# Patient Record
Sex: FEMALE | Race: WHITE | Employment: PART TIME | URBAN - METROPOLITAN AREA
[De-identification: names, ages, dates, MRNs, and addresses within clinical notes are randomized per-mention and may not be internally consistent; named-entity substitution may affect disease eponyms.]

---

## 2018-04-12 ENCOUNTER — HOSPITAL ENCOUNTER (EMERGENCY)
Age: 38
Discharge: HOME | End: 2018-04-13
Attending: EMERGENCY MEDICINE
Payer: COMMERCIAL

## 2018-04-12 DIAGNOSIS — R00.0 TACHYCARDIA: ICD-10-CM

## 2018-04-12 DIAGNOSIS — M79.10 MYALGIA: Primary | ICD-10-CM

## 2018-04-12 DIAGNOSIS — R11.2 NAUSEA AND VOMITING, INTRACTABILITY OF VOMITING NOT SPECIFIED, UNSPECIFIED VOMITING TYPE: ICD-10-CM

## 2018-04-12 PROCEDURE — 99283 EMERGENCY DEPT VISIT LOW MDM: CPT

## 2018-04-12 ASSESSMENT — PAIN SCALES - GENERAL: PAINLEVEL_OUTOF10: 5

## 2018-04-12 ASSESSMENT — PAIN DESCRIPTION - FREQUENCY: FREQUENCY: CONTINUOUS

## 2018-04-12 ASSESSMENT — PAIN DESCRIPTION - PAIN TYPE: TYPE: ACUTE PAIN

## 2018-04-12 ASSESSMENT — PAIN DESCRIPTION - DESCRIPTORS: DESCRIPTORS: ACHING

## 2018-04-12 ASSESSMENT — PAIN DESCRIPTION - LOCATION: LOCATION: GENERALIZED

## 2018-04-13 ENCOUNTER — APPOINTMENT (OUTPATIENT)
Dept: GENERAL RADIOLOGY | Age: 38
End: 2018-04-13
Payer: COMMERCIAL

## 2018-04-13 VITALS
OXYGEN SATURATION: 96 % | HEART RATE: 108 BPM | BODY MASS INDEX: 23.9 KG/M2 | WEIGHT: 140 LBS | HEIGHT: 64 IN | RESPIRATION RATE: 17 BRPM | SYSTOLIC BLOOD PRESSURE: 141 MMHG | DIASTOLIC BLOOD PRESSURE: 91 MMHG | TEMPERATURE: 99.4 F

## 2018-04-13 LAB
ALBUMIN SERPL-MCNC: 4.4 G/DL (ref 3.5–5.2)
ALP BLD-CCNC: 71 U/L (ref 35–104)
ALT SERPL-CCNC: 7 U/L (ref 0–32)
ANION GAP SERPL CALCULATED.3IONS-SCNC: 16 MMOL/L (ref 7–16)
ANION GAP SERPL CALCULATED.3IONS-SCNC: 20 MMOL/L (ref 7–16)
AST SERPL-CCNC: 22 U/L (ref 0–31)
BACTERIA: ABNORMAL /HPF
BASOPHILS ABSOLUTE: 0 E9/L (ref 0–0.2)
BASOPHILS RELATIVE PERCENT: 0.3 % (ref 0–2)
BETA-HYDROXYBUTYRATE: 0.53 MMOL/L (ref 0.02–0.27)
BILIRUB SERPL-MCNC: 1.1 MG/DL (ref 0–1.2)
BILIRUBIN URINE: NEGATIVE
BLOOD, URINE: ABNORMAL
BUN BLDV-MCNC: 12 MG/DL (ref 6–20)
BUN BLDV-MCNC: 13 MG/DL (ref 6–20)
CALCIUM SERPL-MCNC: 8.6 MG/DL (ref 8.6–10.2)
CALCIUM SERPL-MCNC: 9.4 MG/DL (ref 8.6–10.2)
CHLORIDE BLD-SCNC: 92 MMOL/L (ref 98–107)
CHLORIDE BLD-SCNC: 97 MMOL/L (ref 98–107)
CLARITY: CLEAR
CO2: 21 MMOL/L (ref 22–29)
CO2: 21 MMOL/L (ref 22–29)
COLOR: YELLOW
CREAT SERPL-MCNC: 1.2 MG/DL (ref 0.5–1)
CREAT SERPL-MCNC: 1.2 MG/DL (ref 0.5–1)
EOSINOPHILS ABSOLUTE: 0 E9/L (ref 0.05–0.5)
EOSINOPHILS RELATIVE PERCENT: 0.2 % (ref 0–6)
EPITHELIAL CELLS, UA: ABNORMAL /HPF
GFR AFRICAN AMERICAN: >60
GFR AFRICAN AMERICAN: >60
GFR NON-AFRICAN AMERICAN: 50 ML/MIN/1.73
GFR NON-AFRICAN AMERICAN: 50 ML/MIN/1.73
GLUCOSE BLD-MCNC: 177 MG/DL (ref 74–109)
GLUCOSE BLD-MCNC: 219 MG/DL (ref 74–109)
GLUCOSE URINE: >=1000 MG/DL
HCT VFR BLD CALC: 37.9 % (ref 34–48)
HEMOGLOBIN: 12.9 G/DL (ref 11.5–15.5)
INFLUENZA A BY PCR: NOT DETECTED
INFLUENZA B BY PCR: NOT DETECTED
KETONES, URINE: 15 MG/DL
LACTIC ACID: 2 MMOL/L (ref 0.5–2.2)
LEUKOCYTE ESTERASE, URINE: ABNORMAL
LIPASE: 7 U/L (ref 13–60)
LYMPHOCYTES ABSOLUTE: 0.09 E9/L (ref 1.5–4)
LYMPHOCYTES RELATIVE PERCENT: 0.9 % (ref 20–42)
MCH RBC QN AUTO: 30.2 PG (ref 26–35)
MCHC RBC AUTO-ENTMCNC: 34 % (ref 32–34.5)
MCV RBC AUTO: 88.8 FL (ref 80–99.9)
MONOCYTES ABSOLUTE: 0.09 E9/L (ref 0.1–0.95)
MONOCYTES RELATIVE PERCENT: 0.9 % (ref 2–12)
NEUTROPHILS ABSOLUTE: 8.43 E9/L (ref 1.8–7.3)
NEUTROPHILS RELATIVE PERCENT: 98.3 % (ref 43–80)
NITRITE, URINE: NEGATIVE
OVALOCYTES: ABNORMAL
PDW BLD-RTO: 12.7 FL (ref 11.5–15)
PH UA: 6.5 (ref 5–9)
PLATELET # BLD: 267 E9/L (ref 130–450)
PMV BLD AUTO: 9.8 FL (ref 7–12)
POIKILOCYTES: ABNORMAL
POTASSIUM SERPL-SCNC: 4.4 MMOL/L (ref 3.5–5)
POTASSIUM SERPL-SCNC: 4.9 MMOL/L (ref 3.5–5)
PROTEIN UA: NEGATIVE MG/DL
RBC # BLD: 4.27 E12/L (ref 3.5–5.5)
RBC UA: ABNORMAL /HPF (ref 0–2)
SODIUM BLD-SCNC: 133 MMOL/L (ref 132–146)
SODIUM BLD-SCNC: 134 MMOL/L (ref 132–146)
SPECIFIC GRAVITY UA: 1.01 (ref 1–1.03)
TOTAL PROTEIN: 7.5 G/DL (ref 6.4–8.3)
UROBILINOGEN, URINE: 0.2 E.U./DL
WBC # BLD: 8.6 E9/L (ref 4.5–11.5)
WBC UA: ABNORMAL /HPF (ref 0–5)

## 2018-04-13 PROCEDURE — 36415 COLL VENOUS BLD VENIPUNCTURE: CPT

## 2018-04-13 PROCEDURE — 83690 ASSAY OF LIPASE: CPT

## 2018-04-13 PROCEDURE — 71045 X-RAY EXAM CHEST 1 VIEW: CPT

## 2018-04-13 PROCEDURE — 82010 KETONE BODYS QUAN: CPT

## 2018-04-13 PROCEDURE — 85025 COMPLETE CBC W/AUTO DIFF WBC: CPT

## 2018-04-13 PROCEDURE — 93005 ELECTROCARDIOGRAM TRACING: CPT | Performed by: EMERGENCY MEDICINE

## 2018-04-13 PROCEDURE — 87040 BLOOD CULTURE FOR BACTERIA: CPT

## 2018-04-13 PROCEDURE — 80053 COMPREHEN METABOLIC PANEL: CPT

## 2018-04-13 PROCEDURE — 87502 INFLUENZA DNA AMP PROBE: CPT

## 2018-04-13 PROCEDURE — 83605 ASSAY OF LACTIC ACID: CPT

## 2018-04-13 PROCEDURE — 81001 URINALYSIS AUTO W/SCOPE: CPT

## 2018-04-13 PROCEDURE — 80048 BASIC METABOLIC PNL TOTAL CA: CPT

## 2018-04-13 PROCEDURE — 2580000003 HC RX 258: Performed by: EMERGENCY MEDICINE

## 2018-04-13 RX ORDER — 0.9 % SODIUM CHLORIDE 0.9 %
1000 INTRAVENOUS SOLUTION INTRAVENOUS ONCE
Status: COMPLETED | OUTPATIENT
Start: 2018-04-13 | End: 2018-04-13

## 2018-04-13 RX ADMIN — SODIUM CHLORIDE 1000 ML: 9 INJECTION, SOLUTION INTRAVENOUS at 00:55

## 2018-04-18 LAB
BLOOD CULTURE, ROUTINE: NORMAL
CULTURE, BLOOD 2: NORMAL
EKG ATRIAL RATE: 112 BPM
EKG P AXIS: 60 DEGREES
EKG P-R INTERVAL: 136 MS
EKG Q-T INTERVAL: 334 MS
EKG QRS DURATION: 84 MS
EKG QTC CALCULATION (BAZETT): 455 MS
EKG R AXIS: 53 DEGREES
EKG T AXIS: 61 DEGREES
EKG VENTRICULAR RATE: 112 BPM

## 2018-04-28 ENCOUNTER — HOSPITAL ENCOUNTER (OUTPATIENT)
Age: 38
Discharge: HOME | End: 2018-04-28
Payer: COMMERCIAL

## 2018-04-28 LAB
ALBUMIN SERPL-MCNC: 4.1 G/DL (ref 3.5–5.2)
ALP BLD-CCNC: 117 U/L (ref 35–104)
ALT SERPL-CCNC: 28 U/L (ref 0–32)
ANION GAP SERPL CALCULATED.3IONS-SCNC: 12 MMOL/L (ref 7–16)
AST SERPL-CCNC: 22 U/L (ref 0–31)
BILIRUB SERPL-MCNC: 0.7 MG/DL (ref 0–1.2)
BUN BLDV-MCNC: 10 MG/DL (ref 6–20)
CALCIUM SERPL-MCNC: 9.3 MG/DL (ref 8.6–10.2)
CHLORIDE BLD-SCNC: 101 MMOL/L (ref 98–107)
CHOLESTEROL, FASTING: 170 MG/DL (ref 0–199)
CO2: 26 MMOL/L (ref 22–29)
CREAT SERPL-MCNC: 0.9 MG/DL (ref 0.5–1)
CREATININE URINE: 115 MG/DL (ref 29–226)
GFR AFRICAN AMERICAN: >60
GFR NON-AFRICAN AMERICAN: >60 ML/MIN/1.73
GLUCOSE FASTING: 135 MG/DL (ref 74–109)
HBA1C MFR BLD: 6.3 % (ref 4.8–5.9)
HDLC SERPL-MCNC: 51 MG/DL
LDL CHOLESTEROL CALCULATED: 105 MG/DL (ref 0–99)
MICROALBUMIN UR-MCNC: <12 MG/L
MICROALBUMIN/CREAT UR-RTO: ABNORMAL (ref 0–30)
POTASSIUM SERPL-SCNC: 3.8 MMOL/L (ref 3.5–5)
SODIUM BLD-SCNC: 139 MMOL/L (ref 132–146)
T4 FREE: 1.46 NG/DL (ref 0.93–1.7)
TOTAL PROTEIN: 7.4 G/DL (ref 6.4–8.3)
TRIGLYCERIDE, FASTING: 69 MG/DL (ref 0–149)
TSH SERPL DL<=0.05 MIU/L-ACNC: 0.6 UIU/ML (ref 0.27–4.2)
VLDLC SERPL CALC-MCNC: 14 MG/DL

## 2018-04-28 PROCEDURE — 82570 ASSAY OF URINE CREATININE: CPT

## 2018-04-28 PROCEDURE — 84439 ASSAY OF FREE THYROXINE: CPT

## 2018-04-28 PROCEDURE — 83721 ASSAY OF BLOOD LIPOPROTEIN: CPT

## 2018-04-28 PROCEDURE — 83036 HEMOGLOBIN GLYCOSYLATED A1C: CPT

## 2018-04-28 PROCEDURE — 80061 LIPID PANEL: CPT

## 2018-04-28 PROCEDURE — 80053 COMPREHEN METABOLIC PANEL: CPT

## 2018-04-28 PROCEDURE — 84443 ASSAY THYROID STIM HORMONE: CPT

## 2018-04-28 PROCEDURE — 36415 COLL VENOUS BLD VENIPUNCTURE: CPT

## 2018-04-28 PROCEDURE — 82044 UR ALBUMIN SEMIQUANTITATIVE: CPT

## 2018-05-06 LAB
Lab: NORMAL
REPORT: NORMAL
THIS TEST SENT TO: NORMAL

## 2018-05-18 ENCOUNTER — TELEPHONE (OUTPATIENT)
Dept: ORTHOPEDIC SURGERY | Age: 38
End: 2018-05-18

## 2018-05-18 DIAGNOSIS — M65.321 TRIGGER FINGER, RIGHT INDEX FINGER: Primary | ICD-10-CM

## 2018-05-18 DIAGNOSIS — M65.341 TRIGGER FINGER, RIGHT RING FINGER: ICD-10-CM

## 2018-05-22 ENCOUNTER — OFFICE VISIT (OUTPATIENT)
Dept: ORTHOPEDIC SURGERY | Age: 38
End: 2018-05-22
Payer: COMMERCIAL

## 2018-05-22 VITALS
SYSTOLIC BLOOD PRESSURE: 120 MMHG | WEIGHT: 140 LBS | HEIGHT: 64 IN | TEMPERATURE: 98.3 F | HEART RATE: 74 BPM | DIASTOLIC BLOOD PRESSURE: 80 MMHG | RESPIRATION RATE: 16 BRPM | BODY MASS INDEX: 23.9 KG/M2

## 2018-05-22 DIAGNOSIS — M65.321 TRIGGER FINGER, RIGHT INDEX FINGER: Primary | ICD-10-CM

## 2018-05-22 PROCEDURE — 1036F TOBACCO NON-USER: CPT | Performed by: ORTHOPAEDIC SURGERY

## 2018-05-22 PROCEDURE — 20550 NJX 1 TENDON SHEATH/LIGAMENT: CPT | Performed by: ORTHOPAEDIC SURGERY

## 2018-05-22 PROCEDURE — G8420 CALC BMI NORM PARAMETERS: HCPCS | Performed by: ORTHOPAEDIC SURGERY

## 2018-05-22 PROCEDURE — G8427 DOCREV CUR MEDS BY ELIG CLIN: HCPCS | Performed by: ORTHOPAEDIC SURGERY

## 2018-05-22 PROCEDURE — 99212 OFFICE O/P EST SF 10 MIN: CPT | Performed by: ORTHOPAEDIC SURGERY

## 2018-05-22 RX ORDER — BETAMETHASONE SODIUM PHOSPHATE AND BETAMETHASONE ACETATE 3; 3 MG/ML; MG/ML
6 INJECTION, SUSPENSION INTRA-ARTICULAR; INTRALESIONAL; INTRAMUSCULAR; SOFT TISSUE ONCE
Status: COMPLETED | OUTPATIENT
Start: 2018-05-22 | End: 2018-05-22

## 2018-05-22 RX ADMIN — BETAMETHASONE SODIUM PHOSPHATE AND BETAMETHASONE ACETATE 6 MG: 3; 3 INJECTION, SUSPENSION INTRA-ARTICULAR; INTRALESIONAL; INTRAMUSCULAR; SOFT TISSUE at 14:47

## 2018-06-20 ENCOUNTER — HOSPITAL ENCOUNTER (OUTPATIENT)
Age: 38
Discharge: HOME | End: 2018-06-20
Payer: COMMERCIAL

## 2018-06-20 LAB
ALBUMIN SERPL-MCNC: 4.3 G/DL (ref 3.5–5.2)
ALP BLD-CCNC: 51 U/L (ref 35–104)
ALT SERPL-CCNC: 6 U/L (ref 0–32)
ANION GAP SERPL CALCULATED.3IONS-SCNC: 12 MMOL/L (ref 7–16)
AST SERPL-CCNC: 15 U/L (ref 0–31)
BILIRUB SERPL-MCNC: 0.6 MG/DL (ref 0–1.2)
BUN BLDV-MCNC: 8 MG/DL (ref 6–20)
CALCIUM SERPL-MCNC: 9.1 MG/DL (ref 8.6–10.2)
CHLORIDE BLD-SCNC: 99 MMOL/L (ref 98–107)
CHOLESTEROL, FASTING: 173 MG/DL (ref 0–199)
CO2: 24 MMOL/L (ref 22–29)
CREAT SERPL-MCNC: 0.9 MG/DL (ref 0.5–1)
GFR AFRICAN AMERICAN: >60
GFR NON-AFRICAN AMERICAN: >60 ML/MIN/1.73
GLUCOSE BLD-MCNC: 150 MG/DL (ref 74–109)
HBA1C MFR BLD: 6.4 % (ref 4.8–5.9)
HDLC SERPL-MCNC: 63 MG/DL
LDL CHOLESTEROL CALCULATED: 100 MG/DL (ref 0–99)
POTASSIUM SERPL-SCNC: 4.1 MMOL/L (ref 3.5–5)
SODIUM BLD-SCNC: 135 MMOL/L (ref 132–146)
T4 FREE: 1.61 NG/DL (ref 0.93–1.7)
TOTAL PROTEIN: 7 G/DL (ref 6.4–8.3)
TRIGLYCERIDE, FASTING: 50 MG/DL (ref 0–149)
TSH SERPL DL<=0.05 MIU/L-ACNC: 0.19 UIU/ML (ref 0.27–4.2)
VLDLC SERPL CALC-MCNC: 10 MG/DL

## 2018-06-20 PROCEDURE — 36415 COLL VENOUS BLD VENIPUNCTURE: CPT

## 2018-06-20 PROCEDURE — 83721 ASSAY OF BLOOD LIPOPROTEIN: CPT

## 2018-06-20 PROCEDURE — 83036 HEMOGLOBIN GLYCOSYLATED A1C: CPT

## 2018-06-20 PROCEDURE — 84439 ASSAY OF FREE THYROXINE: CPT

## 2018-06-20 PROCEDURE — 80053 COMPREHEN METABOLIC PANEL: CPT

## 2018-06-20 PROCEDURE — 84443 ASSAY THYROID STIM HORMONE: CPT

## 2018-06-20 PROCEDURE — 80061 LIPID PANEL: CPT

## 2018-06-25 LAB
Lab: NORMAL
REPORT: NORMAL
THIS TEST SENT TO: NORMAL

## 2018-09-11 ENCOUNTER — OFFICE VISIT (OUTPATIENT)
Dept: ORTHOPEDIC SURGERY | Age: 38
End: 2018-09-11
Payer: COMMERCIAL

## 2018-09-11 VITALS
DIASTOLIC BLOOD PRESSURE: 85 MMHG | SYSTOLIC BLOOD PRESSURE: 134 MMHG | RESPIRATION RATE: 16 BRPM | TEMPERATURE: 98.2 F | HEART RATE: 101 BPM

## 2018-09-11 DIAGNOSIS — M65.351 TRIGGER LITTLE FINGER OF RIGHT HAND: Primary | ICD-10-CM

## 2018-09-11 DIAGNOSIS — M65.321 TRIGGER INDEX FINGER OF RIGHT HAND: ICD-10-CM

## 2018-09-11 PROCEDURE — 1036F TOBACCO NON-USER: CPT | Performed by: ORTHOPAEDIC SURGERY

## 2018-09-11 PROCEDURE — G8427 DOCREV CUR MEDS BY ELIG CLIN: HCPCS | Performed by: ORTHOPAEDIC SURGERY

## 2018-09-11 PROCEDURE — G8420 CALC BMI NORM PARAMETERS: HCPCS | Performed by: ORTHOPAEDIC SURGERY

## 2018-09-11 PROCEDURE — 20550 NJX 1 TENDON SHEATH/LIGAMENT: CPT | Performed by: ORTHOPAEDIC SURGERY

## 2018-09-11 PROCEDURE — 99213 OFFICE O/P EST LOW 20 MIN: CPT | Performed by: ORTHOPAEDIC SURGERY

## 2018-09-11 RX ORDER — BUSPIRONE HYDROCHLORIDE 7.5 MG/1
7.5 TABLET ORAL
COMMUNITY
End: 2021-12-09

## 2018-09-11 RX ORDER — BETAMETHASONE SODIUM PHOSPHATE AND BETAMETHASONE ACETATE 3; 3 MG/ML; MG/ML
12 INJECTION, SUSPENSION INTRA-ARTICULAR; INTRALESIONAL; INTRAMUSCULAR; SOFT TISSUE ONCE
Status: COMPLETED | OUTPATIENT
Start: 2018-09-11 | End: 2018-09-13

## 2018-09-11 NOTE — PROGRESS NOTES
Department of Orthopedic Surgery  History and Physical      CHIEF COMPLAINT:  Right hand pain    HISTORY OF PRESENT ILLNESS:                The patient is a 43 y.o. female who presents with A right hand pain. At her last visit she had a right index finger cortisone injection. She states this helped her pain. She still remains with clicking. She reports pain to the right small finger. She states this is quite painful to her. No numbness or tingling. Past Medical History:        Diagnosis Date    Anxiety     Diabetes mellitus type 1, controlled, without complications ([address] 8/3/7952    DKA [facility]diabetic ketoacidoses) [address] [address]     2016 was [facility] (see notes)    Epicondylitis, lateral 2017    right    Epigastric pain 2016    HX OTHER MEDICAL     after C section, face itched    Hypertension     IBS (irritable bowel syndrome)     Insulin pump status has insulin pump    SIRS (systemic inflammatory response syndrome) ([facility] [address] 2016    Thyroid disease      Past Surgical History:        Procedure Laterality Date    CARPAL TUNNEL RELEASE      left hand     DEBRIDEMENT Right 2017    Right lateral epicondylar debridement    ENDOSCOPY, COLON, DIAGNOSTIC      FINGER TRIGGER RELEASE      middle finger bilat, and right thumb     FINGER TRIGGER RELEASE Right 2016    4th finger    LAPAROSCOPY      exploratory     [place]  DELIVERY ONLY  x2    , Low Cervical    SKIN BIOPSY       Current Medications:   No current facility-administered medications for this visit. Allergies:  Penicillins and Sulfa antibiotics    Social History:   TOBACCO:   reports that she has never smoked. She has never used smokeless tobacco.  ETOH:   reports that she drinks alcohol. DRUGS:   reports that she does not use drugs.   ACTIVITIES OF DAILY LIVING:    OCCUPATION:    Family History:   Family History   Problem Relation Age of Onset    Cancer Mother     High Blood Pressure Maternal Grandmother        REVIEW OF SYSTEMS:  CONSTITUTIONAL:  negative  EYES:  negative  HEENT:  negative  RESPIRATORY:  negative  CARDIOVASCULAR:  negative  GASTROINTESTINAL:  negative  GENITOURINARY:  negative  INTEGUMENT/BREAST:  negative  HEMATOLOGIC/LYMPHATIC:  negative  ALLERGIC/IMMUNOLOGIC:  negative  ENDOCRINE:  negative  MUSCULOSKELETAL:  pain  NEUROLOGICAL:  negative  BEHAVIOR/PSYCH:  negative    PHYSICAL EXAM:    VITALS:  /85 (Site: Left Upper Arm, Position: Sitting)   Pulse 101   Temp 98.2 °F (36.8 °C) (Oral)   Resp 16   CONSTITUTIONAL:  awake, alert, cooperative, no apparent distress, and appears stated age  EYES:  Lids and lashes normal, pupils equal, round and reactive to light, extra ocular muscles intact, sclera clear, conjunctiva normal  ENT:  Normocephalic, without obvious abnormality, atraumatic, sinuses nontender on palpation, external ears without lesions, oral pharynx with moist mucus membranes, tonsils without erythema or exudates, gums normal and good dentition. NECK:  Supple, symmetrical, trachea midline, no adenopathy, thyroid symmetric, not enlarged and no tenderness, skin normal  LUNGS:  CTA  CARDIOVASCULAR:  2+ radial pulses, extremities warm and well perfused  ABDOMEN:   NTTP  CHEST:  Atraumatic   GENITAL/URINARY:  deferred  NEUROLOGIC:  Awake, alert, oriented to name, place and time. Cranial nerves II-XII are grossly intact. Motor is 5 out of 5 bilaterally. Sensory is intact.  gait is normal.  MUSCULOSKELETAL:    Right upper extremity: Positive tenderness and swelling over A1 pulley to the ring and small finger with no active triggering. Active triggering over the A1 pulley of the index finger with no tenderness. Well-healed carpal tunnel incision. Full thumb middle ring and small finger flexion and extension without triggering. Radial, ulnar, median nerves are intact light touch. 5/5 motor testing to upper extremity. 2+ radial pulse. Brisk cap refill digits.     DATA:    CBC: Lab Results   Component Value Date    WBC 8.6 04/13/2018    RBC 4.27 04/13/2018    HGB 12.9 04/13/2018    HCT 37.9 04/13/2018    MCV 88.8 04/13/2018    MCH 30.2 04/13/2018    MCHC 34.0 04/13/2018    RDW 12.7 04/13/2018     04/13/2018    MPV 9.8 04/13/2018     PT/INR:  No results found for: PROTIME, INR      IMPRESSION:  · Right index and small finger trigger    PLAN:  Discussed findings with the patient. Discussed conservative and surgical management with the patient. Ultimately the patient decided to have a repeat of her right index finger trigger cortisone injection along with a right small finger cortisone injection. If these help, the patient may repeat them every 3 months. If she has no improvement she may consider surgical management. Follow-up as needed. Procedure Note Trigger Finger Injection    The right Index finger A1 pulley and Little finger A1 pulley was identified as the injection site. The risk and benefits of a cortisone injection were explained and the patient consented to the injection. Under sterile conditions, the digit was injected with a mixture of 1 mL of 1% Lidocaine and 1 mL of 6 mg/mL Betamethasone without complication. A sterile bandage was applied. I have seen and evaluated the patient and agree with the above assessment and plan on today's visit. I have performed the key components of the history and physical examination with significant findings of Persistent right index and little finger triggers. Injections provided. Discussed possible surgical intervention if necessary. . I concur with the findings and plan as documented.     Alicia Corea MD  9/11/2018

## 2018-09-13 RX ADMIN — BETAMETHASONE SODIUM PHOSPHATE AND BETAMETHASONE ACETATE 12 MG: 3; 3 INJECTION, SUSPENSION INTRA-ARTICULAR; INTRALESIONAL; INTRAMUSCULAR; SOFT TISSUE at 09:36

## 2019-02-04 ENCOUNTER — OFFICE VISIT (OUTPATIENT)
Dept: ORTHOPEDIC SURGERY | Age: 39
End: 2019-02-04
Payer: COMMERCIAL

## 2019-02-04 VITALS
HEIGHT: 64 IN | BODY MASS INDEX: 24.75 KG/M2 | TEMPERATURE: 98.7 F | SYSTOLIC BLOOD PRESSURE: 134 MMHG | HEART RATE: 94 BPM | WEIGHT: 145 LBS | RESPIRATION RATE: 20 BRPM | DIASTOLIC BLOOD PRESSURE: 87 MMHG

## 2019-02-04 DIAGNOSIS — M65.321 TRIGGER INDEX FINGER OF RIGHT HAND: ICD-10-CM

## 2019-02-04 DIAGNOSIS — M65.351 TRIGGER LITTLE FINGER OF RIGHT HAND: Primary | ICD-10-CM

## 2019-02-04 PROCEDURE — 20550 NJX 1 TENDON SHEATH/LIGAMENT: CPT | Performed by: ORTHOPAEDIC SURGERY

## 2019-02-04 PROCEDURE — 1036F TOBACCO NON-USER: CPT | Performed by: ORTHOPAEDIC SURGERY

## 2019-02-04 PROCEDURE — G8420 CALC BMI NORM PARAMETERS: HCPCS | Performed by: ORTHOPAEDIC SURGERY

## 2019-02-04 PROCEDURE — 99214 OFFICE O/P EST MOD 30 MIN: CPT | Performed by: ORTHOPAEDIC SURGERY

## 2019-02-04 PROCEDURE — G8427 DOCREV CUR MEDS BY ELIG CLIN: HCPCS | Performed by: ORTHOPAEDIC SURGERY

## 2019-02-04 PROCEDURE — G8484 FLU IMMUNIZE NO ADMIN: HCPCS | Performed by: ORTHOPAEDIC SURGERY

## 2019-02-04 RX ORDER — BETAMETHASONE SODIUM PHOSPHATE AND BETAMETHASONE ACETATE 3; 3 MG/ML; MG/ML
12 INJECTION, SUSPENSION INTRA-ARTICULAR; INTRALESIONAL; INTRAMUSCULAR; SOFT TISSUE ONCE
Status: COMPLETED | OUTPATIENT
Start: 2019-02-04 | End: 2019-02-04

## 2019-02-04 RX ADMIN — BETAMETHASONE SODIUM PHOSPHATE AND BETAMETHASONE ACETATE 12 MG: 3; 3 INJECTION, SUSPENSION INTRA-ARTICULAR; INTRALESIONAL; INTRAMUSCULAR; SOFT TISSUE at 15:58

## 2019-03-16 ENCOUNTER — HOSPITAL ENCOUNTER (OUTPATIENT)
Age: 39
Discharge: HOME | End: 2019-03-16
Payer: MEDICAID

## 2019-03-16 LAB
ALBUMIN SERPL-MCNC: 4.7 G/DL (ref 3.5–5.2)
ALP BLD-CCNC: 56 U/L (ref 35–104)
ALT SERPL-CCNC: 8 U/L (ref 0–32)
ANION GAP SERPL CALCULATED.3IONS-SCNC: 10 MMOL/L (ref 7–16)
AST SERPL-CCNC: 16 U/L (ref 0–31)
BILIRUB SERPL-MCNC: 0.6 MG/DL (ref 0–1.2)
BUN BLDV-MCNC: 7 MG/DL (ref 6–20)
CALCIUM SERPL-MCNC: 9.6 MG/DL (ref 8.6–10.2)
CHLORIDE BLD-SCNC: 99 MMOL/L (ref 98–107)
CO2: 31 MMOL/L (ref 22–29)
CREAT SERPL-MCNC: 0.9 MG/DL (ref 0.5–1)
CREATININE URINE: 185 MG/DL (ref 29–226)
GFR AFRICAN AMERICAN: >60
GFR NON-AFRICAN AMERICAN: >60 ML/MIN/1.73
GLUCOSE BLD-MCNC: 83 MG/DL (ref 74–99)
HBA1C MFR BLD: 6.6 % (ref 4–5.6)
MICROALBUMIN UR-MCNC: <12 MG/L
MICROALBUMIN/CREAT UR-RTO: ABNORMAL (ref 0–30)
POTASSIUM SERPL-SCNC: 4.6 MMOL/L (ref 3.5–5)
SODIUM BLD-SCNC: 140 MMOL/L (ref 132–146)
T4 FREE: 1.77 NG/DL (ref 0.93–1.7)
TOTAL PROTEIN: 7.6 G/DL (ref 6.4–8.3)
TSH SERPL DL<=0.05 MIU/L-ACNC: 0.06 UIU/ML (ref 0.27–4.2)
VITAMIN D 25-HYDROXY: 13 NG/ML (ref 30–100)

## 2019-03-16 PROCEDURE — 82570 ASSAY OF URINE CREATININE: CPT

## 2019-03-16 PROCEDURE — 84439 ASSAY OF FREE THYROXINE: CPT

## 2019-03-16 PROCEDURE — 83036 HEMOGLOBIN GLYCOSYLATED A1C: CPT

## 2019-03-16 PROCEDURE — 80053 COMPREHEN METABOLIC PANEL: CPT

## 2019-03-16 PROCEDURE — 84443 ASSAY THYROID STIM HORMONE: CPT

## 2019-03-16 PROCEDURE — 82044 UR ALBUMIN SEMIQUANTITATIVE: CPT

## 2019-03-16 PROCEDURE — 36415 COLL VENOUS BLD VENIPUNCTURE: CPT

## 2019-03-16 PROCEDURE — 82306 VITAMIN D 25 HYDROXY: CPT

## 2019-03-20 ENCOUNTER — PREP FOR PROCEDURE (OUTPATIENT)
Dept: ORTHOPEDIC SURGERY | Age: 39
End: 2019-03-20

## 2019-03-20 RX ORDER — SODIUM CHLORIDE 0.9 % (FLUSH) 0.9 %
10 SYRINGE (ML) INJECTION EVERY 12 HOURS SCHEDULED
Status: CANCELLED | OUTPATIENT
Start: 2019-03-20

## 2019-03-20 RX ORDER — SODIUM CHLORIDE 9 MG/ML
INJECTION, SOLUTION INTRAVENOUS CONTINUOUS
Status: CANCELLED | OUTPATIENT
Start: 2019-03-20

## 2019-03-20 RX ORDER — SODIUM CHLORIDE 0.9 % (FLUSH) 0.9 %
10 SYRINGE (ML) INJECTION PRN
Status: CANCELLED | OUTPATIENT
Start: 2019-03-20

## 2019-03-28 RX ORDER — CHOLECALCIFEROL (VITAMIN D3) 1250 MCG
CAPSULE ORAL
COMMUNITY
End: 2019-10-16

## 2019-03-28 RX ORDER — LEVOTHYROXINE SODIUM 88 UG/1
88 TABLET ORAL DAILY
COMMUNITY
End: 2019-10-16

## 2019-03-28 NOTE — PROGRESS NOTES
[facility] PRE-ADMISSION TESTING INSTRUCTIONS    The Preadmission Testing patient is instructed accordingly using the following criteria (check applicable):    ARRIVAL INSTRUCTIONS:  [x] Parking the day of Surgery is located in the Main Entrance lot. Upon entering the door, make an immediate right to the surgery reception desk    [x] Complimentary [address] Park[address] is available Monday through Friday 6 am to 6 pm    [x] Bring photo ID and insurance card    [] Bring in a copy of Living will or Durable Power of  papers. [x] Please be sure to arrange for responsible adult to provide transportation to and from the hospital    [x] Please arrange for responsible adult to be with you for the 24 hour period post procedure due to having anesthesia      GENERAL INSTRUCTIONS:    [x] Nothing by mouth after midnight, including gum, candy, mints or water    [x] You may brush your teeth, but do not swallow any water    [x] Take medications as instructed with 1-2 oz of water    [x] Stop herbal supplements and vitamins 5 days prior to procedure    [] Follow preop dosing of blood thinners per physician instructions    [x] to adjust insulin pump per Dr. Shabnam Salgado    [] No oral diabetic medications after midnight    [x] If diabetic and have low blood sugar or feel symptomatic, take 1-2oz apple juice only    [] Bring inhalers day of surgery    [] Bring C-PAP/ Bi-Pap day of surgery    [x] Bring urine specimen day of surgery    [x] Shower or bath with soap, lather and rinse well, AM of Surgery, no lotion, powders or creams    [] Follow bowel prep as instructed per surgeon    [] No tobacco products within 24 hours of surgery     [x] No alcohol or illegal drug use within 24 hours of surgery.     [x] Jewelry, body piercing's, eyeglasses, contact lenses and dentures are not permitted into surgery (bring cases)      [x] Please do not wear any nail polish, make up or hair products on the day of surgery    [x] If not already done, you can expect a call from registration    [x] You can expect a call the business day prior to procedure to notify you if your arrival time changes    [x] If you receive a survey after surgery we would greatly appreciate your comments    [] Parent/guardian of a minor must accompany their child and remain on the premises  the entire time they are under our care     [] Pediatric patients may bring favorite toy, blanket or comfort item with them    [] A caregiver or family member must remain with the patient during their stay if they are mentally handicapped, have dementia, disoriented or unable to use a call light or would be a safety concern if left unattended    [x] Please notify surgeon if you develop any illness between now and time of surgery (cold, cough, sore throat, fever, nausea, vomiting) or any signs of infections  including skin, wounds, and dental.    [x]  The Outpatient Pharmacy is available to fill your prescription here on your day of surgery, ask your preop nurse for details    [] Other instructions  EDUCATIONAL MATERIALS PROVIDED:    [] PAT Preoperative Education Packet/Booklet     [] Medication List    [] Fluoroscopy Information Pamphlet    [] Transfusion bracelet applied with instructions    [] Joint replacement video reviewed    [] Shower with soap, lather and rinse well, and use CHG wipes provided the evening before surgery as instructed

## 2019-04-03 ENCOUNTER — HOSPITAL ENCOUNTER (OUTPATIENT)
Age: 39
Setting detail: OUTPATIENT SURGERY
Discharge: HOME | End: 2019-04-03
Attending: ORTHOPAEDIC SURGERY | Admitting: ORTHOPAEDIC SURGERY
Payer: MEDICAID

## 2019-04-03 ENCOUNTER — ANESTHESIA EVENT (OUTPATIENT)
Dept: OPERATING ROOM | Age: 39
End: 2019-04-03
Payer: MEDICAID

## 2019-04-03 ENCOUNTER — ANESTHESIA (OUTPATIENT)
Dept: OPERATING ROOM | Age: 39
End: 2019-04-03
Payer: MEDICAID

## 2019-04-03 VITALS
BODY MASS INDEX: 25.61 KG/M2 | RESPIRATION RATE: 16 BRPM | TEMPERATURE: 97.5 F | DIASTOLIC BLOOD PRESSURE: 89 MMHG | HEIGHT: 64 IN | OXYGEN SATURATION: 95 % | SYSTOLIC BLOOD PRESSURE: 151 MMHG | HEART RATE: 90 BPM | WEIGHT: 150 LBS

## 2019-04-03 VITALS — OXYGEN SATURATION: 93 % | SYSTOLIC BLOOD PRESSURE: 132 MMHG | DIASTOLIC BLOOD PRESSURE: 76 MMHG

## 2019-04-03 DIAGNOSIS — G89.18 POST-OPERATIVE PAIN: Primary | ICD-10-CM

## 2019-04-03 LAB
HCG(URINE) PREGNANCY TEST: NEGATIVE
METER GLUCOSE: 202 MG/DL (ref 74–99)

## 2019-04-03 PROCEDURE — 2500000003 HC RX 250 WO HCPCS: Performed by: ORTHOPAEDIC SURGERY

## 2019-04-03 PROCEDURE — 26055 INCISE FINGER TENDON SHEATH: CPT | Performed by: ORTHOPAEDIC SURGERY

## 2019-04-03 PROCEDURE — 2709999900 HC NON-CHARGEABLE SUPPLY: Performed by: ORTHOPAEDIC SURGERY

## 2019-04-03 PROCEDURE — 6360000002 HC RX W HCPCS: Performed by: NURSE ANESTHETIST, CERTIFIED REGISTERED

## 2019-04-03 PROCEDURE — 2500000003 HC RX 250 WO HCPCS: Performed by: NURSE ANESTHETIST, CERTIFIED REGISTERED

## 2019-04-03 PROCEDURE — 6360000002 HC RX W HCPCS: Performed by: PHYSICIAN ASSISTANT

## 2019-04-03 PROCEDURE — 3600000002 HC SURGERY LEVEL 2 BASE: Performed by: ORTHOPAEDIC SURGERY

## 2019-04-03 PROCEDURE — 7100000010 HC PHASE II RECOVERY - FIRST 15 MIN: Performed by: ORTHOPAEDIC SURGERY

## 2019-04-03 PROCEDURE — 7100000011 HC PHASE II RECOVERY - ADDTL 15 MIN: Performed by: ORTHOPAEDIC SURGERY

## 2019-04-03 PROCEDURE — 82962 GLUCOSE BLOOD TEST: CPT

## 2019-04-03 PROCEDURE — 81025 URINE PREGNANCY TEST: CPT

## 2019-04-03 PROCEDURE — 2580000003 HC RX 258: Performed by: NURSE ANESTHETIST, CERTIFIED REGISTERED

## 2019-04-03 PROCEDURE — 3700000001 HC ADD 15 MINUTES (ANESTHESIA): Performed by: ORTHOPAEDIC SURGERY

## 2019-04-03 PROCEDURE — 3700000000 HC ANESTHESIA ATTENDED CARE: Performed by: ORTHOPAEDIC SURGERY

## 2019-04-03 PROCEDURE — 3600000012 HC SURGERY LEVEL 2 ADDTL 15MIN: Performed by: ORTHOPAEDIC SURGERY

## 2019-04-03 RX ORDER — PROPOFOL 10 MG/ML
INJECTION, EMULSION INTRAVENOUS CONTINUOUS PRN
Status: DISCONTINUED | OUTPATIENT
Start: 2019-04-03 | End: 2019-04-03 | Stop reason: SDUPTHER

## 2019-04-03 RX ORDER — OXYCODONE HYDROCHLORIDE AND ACETAMINOPHEN 5; 325 MG/1; MG/1
1 TABLET ORAL
Status: DISCONTINUED | OUTPATIENT
Start: 2019-04-03 | End: 2019-04-03

## 2019-04-03 RX ORDER — SODIUM CHLORIDE 9 MG/ML
INJECTION, SOLUTION INTRAVENOUS CONTINUOUS
Status: DISCONTINUED | OUTPATIENT
Start: 2019-04-03 | End: 2019-04-03 | Stop reason: HOSPADM

## 2019-04-03 RX ORDER — FENTANYL CITRATE 50 UG/ML
INJECTION, SOLUTION INTRAMUSCULAR; INTRAVENOUS PRN
Status: DISCONTINUED | OUTPATIENT
Start: 2019-04-03 | End: 2019-04-03 | Stop reason: SDUPTHER

## 2019-04-03 RX ORDER — SODIUM CHLORIDE 0.9 % (FLUSH) 0.9 %
10 SYRINGE (ML) INJECTION EVERY 12 HOURS SCHEDULED
Status: DISCONTINUED | OUTPATIENT
Start: 2019-04-03 | End: 2019-04-03 | Stop reason: HOSPADM

## 2019-04-03 RX ORDER — MIDAZOLAM HYDROCHLORIDE 1 MG/ML
INJECTION INTRAMUSCULAR; INTRAVENOUS PRN
Status: DISCONTINUED | OUTPATIENT
Start: 2019-04-03 | End: 2019-04-03 | Stop reason: SDUPTHER

## 2019-04-03 RX ORDER — LIDOCAINE HYDROCHLORIDE 10 MG/ML
INJECTION, SOLUTION INFILTRATION; PERINEURAL PRN
Status: DISCONTINUED | OUTPATIENT
Start: 2019-04-03 | End: 2019-04-03 | Stop reason: ALTCHOICE

## 2019-04-03 RX ORDER — HYDROCODONE BITARTRATE AND ACETAMINOPHEN 5; 325 MG/1; MG/1
1 TABLET ORAL
Status: DISCONTINUED | OUTPATIENT
Start: 2019-04-03 | End: 2019-04-03 | Stop reason: HOSPADM

## 2019-04-03 RX ORDER — CEFAZOLIN SODIUM 2 G/50ML
2 SOLUTION INTRAVENOUS
Status: COMPLETED | OUTPATIENT
Start: 2019-04-03 | End: 2019-04-03

## 2019-04-03 RX ORDER — HYDROCODONE BITARTRATE AND ACETAMINOPHEN 5; 325 MG/1; MG/1
1 TABLET ORAL EVERY 6 HOURS PRN
Qty: 12 TABLET | Refills: 0 | Status: SHIPPED | OUTPATIENT
Start: 2019-04-03 | End: 2019-04-10

## 2019-04-03 RX ORDER — LIDOCAINE HYDROCHLORIDE 20 MG/ML
INJECTION, SOLUTION INFILTRATION; PERINEURAL PRN
Status: DISCONTINUED | OUTPATIENT
Start: 2019-04-03 | End: 2019-04-03 | Stop reason: SDUPTHER

## 2019-04-03 RX ORDER — KETOROLAC TROMETHAMINE 30 MG/ML
INJECTION, SOLUTION INTRAMUSCULAR; INTRAVENOUS PRN
Status: DISCONTINUED | OUTPATIENT
Start: 2019-04-03 | End: 2019-04-03 | Stop reason: SDUPTHER

## 2019-04-03 RX ORDER — SODIUM CHLORIDE 0.9 % (FLUSH) 0.9 %
10 SYRINGE (ML) INJECTION PRN
Status: DISCONTINUED | OUTPATIENT
Start: 2019-04-03 | End: 2019-04-03 | Stop reason: HOSPADM

## 2019-04-03 RX ORDER — SODIUM CHLORIDE 9 MG/ML
INJECTION, SOLUTION INTRAVENOUS CONTINUOUS PRN
Status: DISCONTINUED | OUTPATIENT
Start: 2019-04-03 | End: 2019-04-03 | Stop reason: SDUPTHER

## 2019-04-03 RX ADMIN — KETOROLAC TROMETHAMINE 30 MG: 30 INJECTION, SOLUTION INTRAMUSCULAR; INTRAVENOUS at 11:40

## 2019-04-03 RX ADMIN — SODIUM CHLORIDE: 9 INJECTION, SOLUTION INTRAVENOUS at 11:22

## 2019-04-03 RX ADMIN — PROPOFOL 75 MCG/KG/MIN: 10 INJECTION, EMULSION INTRAVENOUS at 11:28

## 2019-04-03 RX ADMIN — FENTANYL CITRATE 25 MCG: 50 INJECTION, SOLUTION INTRAMUSCULAR; INTRAVENOUS at 11:38

## 2019-04-03 RX ADMIN — FENTANYL CITRATE 25 MCG: 50 INJECTION, SOLUTION INTRAMUSCULAR; INTRAVENOUS at 11:31

## 2019-04-03 RX ADMIN — FENTANYL CITRATE 50 MCG: 50 INJECTION, SOLUTION INTRAMUSCULAR; INTRAVENOUS at 11:25

## 2019-04-03 RX ADMIN — MIDAZOLAM HYDROCHLORIDE 2 MG: 1 INJECTION, SOLUTION INTRAMUSCULAR; INTRAVENOUS at 11:21

## 2019-04-03 RX ADMIN — CEFAZOLIN SODIUM 2 G: 2 SOLUTION INTRAVENOUS at 11:20

## 2019-04-03 RX ADMIN — LIDOCAINE HYDROCHLORIDE 100 MG: 20 INJECTION, SOLUTION INFILTRATION; PERINEURAL at 11:28

## 2019-04-03 ASSESSMENT — PULMONARY FUNCTION TESTS
PIF_VALUE: 0
PIF_VALUE: 1
PIF_VALUE: 0

## 2019-04-03 ASSESSMENT — PAIN - FUNCTIONAL ASSESSMENT: PAIN_FUNCTIONAL_ASSESSMENT: 0-10

## 2019-04-03 ASSESSMENT — PAIN SCALES - GENERAL
PAINLEVEL_OUTOF10: 0

## 2019-04-03 NOTE — ANESTHESIA POSTPROCEDURE EVALUATION
Department of Anesthesiology  Postprocedure Note    Patient: Clifford Avila  MRN: [id number]  YOB: 1980  Date of evaluation: 4/3/2019  Time:  2:36 PM     Procedure Summary     Date:  04/03/19 Room / Location:  [facility] OR  / [facility] OR    Anesthesia Start:  1124 Anesthesia Stop:  3778    Procedure:  TRIGGER RELEASE RIGHT INDEX AND SMALL FINGER (Right Hand) Diagnosis:  (TRIGGER LITTLE FINGER AND INDEX FINGER OF RIGHT HAND)    Surgeon:  Kody Hankins MD Responsible Provider:  Lala Chavarria DO    Anesthesia Type:  MAC ASA Status:  3          Anesthesia Type: MAC    Lidia Phase I: Lidia Score: 10    Lidia Phase II: Lidia Score: 10    Last vitals: Reviewed and per EMR flowsheets.        Anesthesia Post Evaluation    Patient location during evaluation: PACU  Patient participation: complete - patient participated  Level of consciousness: awake and alert  Airway patency: patent  Nausea & Vomiting: no nausea and no vomiting  Complications: no  Cardiovascular status: hemodynamically stable  Respiratory status: acceptable  Hydration status: euvolemic

## 2019-04-03 NOTE — PROGRESS NOTES
Pt. Ready to go home. Discharge instructions and RX given to pt. And  both verbalize understanding. VSS IV removed pt. Dressed.

## 2019-04-03 NOTE — LETTER
[address] [facility] [address]  Phone: [phone]    No name on file. April 3, 2019     Patient: Cheryl Flower   YOB: 1980   Date of Visit: 2/5/2019       To Whom It May Concern: It is my medical opinion that Jenelle Padron may return to work on Monday 04/08/2019. If you have any questions or concerns, please don't hesitate to call. Sincerely,        No name on file.

## 2019-04-03 NOTE — PROGRESS NOTES
Pt. Awake and alert. vss right hand warm dry ace wrap dressing noted. Denies pain, nourishment given.   called to bedside

## 2019-04-03 NOTE — H&P
Department of Orthopedic Surgery  History and Physical        CHIEF COMPLAINT:  Right hand pain     HISTORY OF PRESENT ILLNESS:                 The patient is a 43 y.o. female who presents with A right hand pain. At her last visit 5 months ago she had a right index and small finger cortisone injection. She states this helped her pain until about 1-2 months ago. She has had 4 cortisone injections to the index finger and 1 to the small finger. No numbness or tingling. She previously has had a thumb, middle and ring finger trigger release.     Past Medical History:    Past Medical History             Diagnosis Date    Anxiety      Diabetes mellitus type 1, controlled, without complications ([facility] 9648    DKA (diabetic ketoacidoses) ([facility] [address]       2016 was treated at [address] (see notes)    Epicondylitis, lateral 2017     right    Epigastric pain 2016    HX OTHER MEDICAL       after C section, face itched    Hypertension      IBS (irritable bowel syndrome)      Insulin pump status has insulin pump    SIRS (systemic inflammatory response syndrome) ([facility] [address] 2016    Thyroid disease           Past Surgical History:    Past Surgical History             Procedure Laterality Date    CARPAL TUNNEL RELEASE         left hand     DEBRIDEMENT Right 2017     Right lateral epicondylar debridement    ENDOSCOPY, COLON, DIAGNOSTIC        FINGER TRIGGER RELEASE         middle finger bilat, and right thumb     FINGER TRIGGER RELEASE Right 2016     4th finger    LAPAROSCOPY         exploratory     [place]  DELIVERY ONLY   x2     , Low Cervical    SKIN BIOPSY             Current Medications:   Current Hospital Medications   No current facility-administered medications for this visit. Allergies:  Penicillins and Sulfa antibiotics     Social History:   TOBACCO:   reports that she has never smoked.  She has never used smokeless tobacco.  ETOH:   reports that she drinks alcohol. DRUGS:   reports that she does not use drugs. ACTIVITIES OF DAILY LIVING:    OCCUPATION:    Family History:   Family History         Family History   Problem Relation Age of Onset    Cancer Mother      High Blood Pressure Maternal Grandmother              REVIEW OF SYSTEMS:  CONSTITUTIONAL:  negative  EYES:  negative  HEENT:  negative  RESPIRATORY:  negative  CARDIOVASCULAR:  negative  GASTROINTESTINAL:  negative  GENITOURINARY:  negative  INTEGUMENT/BREAST:  negative  HEMATOLOGIC/LYMPHATIC:  negative  ALLERGIC/IMMUNOLOGIC:  negative  ENDOCRINE:  negative  MUSCULOSKELETAL:  pain  NEUROLOGICAL:  negative  BEHAVIOR/PSYCH:  negative     PHYSICAL EXAM:    VITALS:  /87 (Site: Right Upper Arm, Position: Sitting, Cuff Size: Medium Adult)   Pulse 94   Temp 98.7 °F (37.1 °C) (Oral)   Resp 20   Ht 5' 4\" (1.626 m)   Wt 145 lb (65.8 kg)   BMI 24.89 kg/m²   CONSTITUTIONAL:  awake, alert, cooperative, no apparent distress, and appears stated age  EYES:  Lids and lashes normal, pupils equal, round and reactive to light, extra ocular muscles intact, sclera clear, conjunctiva normal  ENT:  Normocephalic, without obvious abnormality, atraumatic, sinuses nontender on palpation, external ears without lesions, oral pharynx with moist mucus membranes, tonsils without erythema or exudates, gums normal and good dentition. NECK:  Supple, symmetrical, trachea midline, no adenopathy, thyroid symmetric, not enlarged and no tenderness, skin normal  LUNGS:  CTA  CARDIOVASCULAR:  2+ radial pulses, extremities warm and well perfused  ABDOMEN:   NTTP  CHEST:  Atraumatic   GENITAL/URINARY:  deferred  NEUROLOGIC:  Awake, alert, oriented to name, place and time. Cranial nerves II-XII are grossly intact. Motor is 5 out of 5 bilaterally.   Sensory is intact.  gait is normal.  MUSCULOSKELETAL:     Right upper extremity: Positive tenderness and swelling over A1 pulley to the ring and small finger with active triggering to the index finger. Well-healed carpal tunnel incision. Full thumb middle ring and small finger flexion and extension without triggering. Radial, ulnar, median nerves are intact light touch. 5/5 motor testing to upper extremity. 2+ radial pulse. Brisk cap refill digits.     DATA:    CBC:         Lab Results   Component Value Date     WBC 8.6 04/13/2018     RBC 4.27 04/13/2018     HGB 12.9 04/13/2018     HCT 37.9 04/13/2018     MCV 88.8 04/13/2018     MCH 30.2 04/13/2018     MCHC 34.0 04/13/2018     RDW 12.7 04/13/2018      04/13/2018     MPV 9.8 04/13/2018      PT/INR:  No results found for: PROTIME, INR        IMPRESSION:  · Right index and small finger trigger     PLAN:  Discussed findings with the patient. Discussed conservative and surgical management with the patient. She would also like to schedule surgery. We will plan for a right index and small finger trigger release. Post operative course explained to the patient. All questions answered.        I explained the risks, benefits, alternatives and complications of surgery with the patient including but not limited to the risks of infection, possible damage to nerves, vessels, or tendons, stiffness, loss of range of motion, scar sensitivity, wound healing complications, worsening symptoms, possible need for therapy, as well as the possible need further surgery and unanticipated complications. The patient voiced understanding and all questions were answered. The patient elected to proceed with surgical intervention.      I have seen and evaluated the patient and agree with the above assessment and plan on today's visit. I have performed the key components of the history and physical examination with significant findings of Recurrent right index and small finger triggers. Repeat injections provided. We'll plan to schedule surgery for trigger releases. I concur with the findings and plan as documented.       History and Physical Update     Patient was seen and examined. Patient's history and physical was reviewed with the patient. There has been no significant interval changes.

## 2019-04-03 NOTE — OP NOTE
OPERATIVE REPORT       DATE OF PROCEDURE:   4/3/2019     PREOPERATIVE DIAGNOSIS:  1. Right index finger trigger. 2.  Right small finger trigger     POSTOPERATIVE DIAGNOSES:  1. Right index finger trigger. 2.  Right small finger trigger       PROCEDURE PERFORMED:  1. Right index finger trigger release  2. Right small finger trigger release     ANESTHESIA:  1. Monitored anesthesia care. 2.  Local anesthetic by surgeon consisting approximately 10 mL of 1%  lidocaine plain.     ASSISTANT:  MATEO Rowland. She was present throughout the procedure. Her assistance was used for preoperative positioning, intraoperative retraction, closure, and dressing application. Her assistance expedited the case and decreased the surgical time.      FINDINGS:  1.  Evidence of significant stenosis of the flexor tendons  beneath the A1 pulley that was adequately decompressed with release of the  A1 pulley. 2.  Status post A1 pulley release, full intraoperative PIP range of motion  restored.     COMPLICATIONS:  None.     SPECIMENS:  None.     DISPOSITION:  The patient was stable throughout the procedure.     OPERATIVE INDICATION:  The patient is a very patient  with persistent recalcitrant finger triggering. The risks, benefits, alternatives, and  complications of surgery were explained to him including but not limited to  the risks of infection; damage to nerves, vessels, and tendons; failure to  improve his symptoms; worsening of symptoms; recurrence of symptoms,  persistent symptoms; scar sensitivity. The Patient understood and wished to  proceed.      SURGERY IN DETAIL:  The patient was identified in the holding area. The  right finger was identified as the surgical site. Risks, benefits,  alternatives, and complications were again reviewed. All questions  answered and the patient wished to proceed.   The patient was then seen by Anesthesia, taken  to the operating room, placed supine on the table, and underwent monitored  anesthesia care per Anesthesia Department. Under sterile conditions, 10 mL  of 1% lidocaine were infiltrated over the surgical site of the middle  finger.       The right upper extremity was prepared and draped in standard sterile  fashion. The arm was exsanguinated. Tourniquet inflated to 250 mmHg. Total tourniquet time was approximately 10 minutes.     An oblique incision over the A1 pulley of the right index finger was then  created followed by blunt dissection, identification, preservation of  radial and ulnar neurovascular bundles. With these protected, the A1  pulley was incised and extended proximally to include the distal palmar  fascia. The A1 pulley incised longitudenally. The release was then extended distally to include the proximal extent of the A2 pulley and proximally to include the distal palmar fascia. There was evidence of stenosis of the underlying flexor tendons under the A1 pulley that were decompressed with release of the A1 pulley. With proximal retraction of the underlying tendons, there was full and unhindered flexion of the interphalangeal joint. A short transverse incision was made just proximal to the MCP joint flexion crease of the small finger. The radial and ulnar neurovascular bundles were identified and preserved. The A1 pulley was then identified and incised longitudenally. The release was then extended distally to include the proximal extent of the A2 pulley and proximally to include the distal palmar fascia. There was evidence of stenosis of the underlying flexor tendons under the A1 pulley that were decompressed with release of the A1 pulley. With proximal retraction of the underlying tendons, there was full and unhindered flexion of the interphalangeal joint. The tourniquet was then defleated. Hemostasis was achieved with bipolar cautery and the wounds closed with 4-0 Nylon suture. A bulky sterile dressing was applied.  The hand and all digits were pink and warm at the conclusion of the case. The patient tolerated the procedure and was taken to the recovery room in stable condition.        Electronically signed by Kody Hankins MD on 4/3/2019 at 3:32 PM

## 2019-04-03 NOTE — ANESTHESIA PRE PROCEDURE
11/16/17   Historical Provider, MD   pantoprazole (PROTONIX) 40 MG tablet  10/9/17   Historical Provider, MD   levothyroxine (SYNTHROID) 100 MCG tablet Take 100 mcg by mouth Daily    Historical Provider, MD   liothyronine (CYTOMEL) 5 MCG tablet Take 5 mcg by mouth daily    Historical Provider, MD   Cholecalciferol (VITAMIN D3) 2000 UNITS CAPS Take 2 capsules by mouth daily    Historical Provider, MD   rOPINIRole (REQUIP) 2 MG tablet Take 5 mg by mouth nightly     Historical Provider, MD   DULoxetine (CYMBALTA) 30 MG capsule Take 60 mg by mouth daily Instructed to take am of procedure    Historical Provider, MD   pantoprazole (PROTONIX) 40 MG tablet Take 1 tablet by mouth daily for 10 days  Patient taking differently: Take 40 mg by mouth daily Take am [facility] 02/14 5/27/15 2/2/17  Merly Salgado MD       Current medications:    Current Facility-Administered Medications   Medication Dose Route Frequency Provider Last Rate Last Dose    0.9 % sodium chloride infusion   Intravenous Continuous STEPHEN Reyes        ceFAZolin (ANCEF) 2 g in dextrose 3 % 50 mL IVPB (duplex)  2 g Intravenous On Call to [address]        sodium chloride flush 0.9 % injection 10 mL  10 mL Intravenous 2 times per day STEPHEN Reyes        sodium chloride flush 0.9 % injection 10 mL  10 mL Intravenous PRN STEPHEN Reyes           Allergies:     Allergies   Allergen Reactions    Penicillins Hives    Sulfa Antibiotics Rash     fever       Problem List:    Patient Active Problem List   Diagnosis Code    Diabetes mellitus type 1, controlled, without complications ([facility]) U98.6    Epigastric pain R10.13    Nausea & vomiting R11.2    SIRS (systemic inflammatory response syndrome) ([facility]) R65.10    Slow transit constipation K59.01    Diabetic ketoacidosis associated with diabetes mellitus due to underlying condition ([facility]) E08.10    Hypophosphatemia E83.39    Sinus tachycardia R00.0       Past Medical History: Diagnosis Date    Anxiety     Depression     Diabetes mellitus type 1, controlled, without complications ([facility] 3208    Epicondylitis, lateral 2017    right    GERD (gastroesophageal reflux disease)     Hypertension     IBS (irritable bowel syndrome)     Insulin pump status has insulin pump    follows with Dr. Narcisa Fields SIRS (systemic inflammatory response syndrome) ([facility] 2016    Thyroid disease     Trigger finger     right index and little finger       Past Surgical History:        Procedure Laterality Date    CARPAL TUNNEL RELEASE  2011    left hand     DEBRIDEMENT Right 2017    Right lateral epicondylar debridement    ENDOSCOPY, COLON, DIAGNOSTIC      FINGER TRIGGER RELEASE      middle finger bilat, and right thumb  / multiple  / last one 2017    FINGER TRIGGER RELEASE Right 2016    4th finger    LAPAROSCOPY  2009    exploratory     AK  DELIVERY ONLY  x2    , Low Cervical    SKIN BIOPSY         Social History:    Social History     Tobacco Use    Smoking status: Never Smoker    Smokeless tobacco: Never Used   Substance Use Topics    Alcohol use:  Yes     Alcohol/week: 0.0 oz     Comment: socially                                Counseling given: Not Answered      Vital Signs (Current):   Vitals:    19 1027 19 0851   BP:  134/85   Pulse:  96   Resp:  20   Temp:  98 °F (36.7 °C)   TempSrc:  Temporal   SpO2:  97%   Weight: 150 lb (68 kg) 150 lb (68 kg)   Height: 5' 4\" (1.626 m) 5' 4\" (1.626 m)                                              BP Readings from Last 3 Encounters:   19 134/85   19 134/87   10/15/18 130/80       NPO Status: Time of last liquid consumption:                         Time of last solid consumption:                         Date of last liquid consumption: 19                        Date of last solid food consumption: 19    BMI:   Wt Readings from Last 3 Encounters:   19 150 lb (68 kg) pump before OR procedure)  Induction: intravenous. MIPS: Postoperative opioids intended. Anesthetic plan and risks discussed with patient and spouse. Plan discussed with CRNA.           [address] Purvis DO   4/3/2019

## 2019-04-11 ENCOUNTER — OFFICE VISIT (OUTPATIENT)
Dept: ORTHOPEDIC SURGERY | Age: 39
End: 2019-04-11

## 2019-04-11 VITALS
DIASTOLIC BLOOD PRESSURE: 80 MMHG | TEMPERATURE: 98.7 F | RESPIRATION RATE: 16 BRPM | SYSTOLIC BLOOD PRESSURE: 130 MMHG | HEART RATE: 72 BPM

## 2019-04-11 DIAGNOSIS — M65.321 TRIGGER INDEX FINGER OF RIGHT HAND: ICD-10-CM

## 2019-04-11 DIAGNOSIS — M65.351 TRIGGER LITTLE FINGER OF RIGHT HAND: Primary | ICD-10-CM

## 2019-04-11 PROCEDURE — 99024 POSTOP FOLLOW-UP VISIT: CPT | Performed by: ORTHOPAEDIC SURGERY

## 2019-04-11 NOTE — PROGRESS NOTES
HPI: Patient presents today postop day #8 status post right index and small finger trigger release. Overall patient is doing well. Triggering has resolved. She is back to work. Physical Exam: Incisions clean dry and intact with sutures in place. No erythema or signs of infection. Mild swelling over the incision site. Near full flexion of the fingers. Full extension. Otherwise neurovascularly intact. Assessment: postop day #8 status post right index and small finger trigger release    Plan:   Sutures removed today in the office. Scar management advised. Activity restrictions reviewed with the patient. HEP and ROM exercises demonstrated to the patient. Follow up in 1 month if needed. All questions answered. I have seen and evaluated the patient and agree with the above assessment and plan on today's visit. I have performed the key components of the history and physical examination with significant findings of postop. I concur with the findings and plan as documented.     Tucker Roman MD  4/11/2019

## 2019-09-19 ENCOUNTER — HOSPITAL ENCOUNTER (OUTPATIENT)
Dept: GENERAL RADIOLOGY | Age: 39
Discharge: HOME | End: 2019-09-21
Payer: COMMERCIAL

## 2019-09-19 ENCOUNTER — HOSPITAL ENCOUNTER (OUTPATIENT)
Age: 39
Discharge: HOME | End: 2019-09-21
Payer: COMMERCIAL

## 2019-09-19 DIAGNOSIS — M25.551 PAIN IN RIGHT HIP: ICD-10-CM

## 2019-09-19 PROCEDURE — 73502 X-RAY EXAM HIP UNI 2-3 VIEWS: CPT

## 2019-09-19 PROCEDURE — 73552 X-RAY EXAM OF FEMUR 2/>: CPT

## 2020-04-29 ENCOUNTER — VIRTUAL VISIT (OUTPATIENT)
Dept: ENDOCRINOLOGY | Age: 40
End: 2020-04-29
Payer: COMMERCIAL

## 2020-04-29 PROCEDURE — 99204 OFFICE O/P NEW MOD 45 MIN: CPT | Performed by: INTERNAL MEDICINE

## 2020-04-29 RX ORDER — ROSUVASTATIN CALCIUM 5 MG/1
5 TABLET, COATED ORAL DAILY
COMMUNITY
End: 2021-12-09

## 2020-04-29 NOTE — LETTER
: Denied any dysuria, hematuria, flank pain, discharge, or incontinence. Skin: denied any rash, ulcer, Hirsute, or hyperpigmentation. MSK: denied any joint deformity, joint pain/swelling, muscle pain, or back pain. Neuro: no numbness, no tingling, no weakness, _    OBJECTIVE    There were no vitals taken for this visit. BP Readings from Last 4 Encounters:   10/16/19 119/79   04/11/19 130/80   04/03/19 (!) 151/89   04/03/19 132/76     Wt Readings from Last 6 Encounters:   10/16/19 152 lb (68.9 kg)   04/03/19 150 lb (68 kg)   02/04/19 145 lb (65.8 kg)   10/15/18 155 lb (70.3 kg)   05/22/18 140 lb (63.5 kg)   04/12/18 140 lb (63.5 kg)     Due to this being a TeleHealth encounter, evaluation of the following organ systems is limited: Vitals/Constitutional/EENT/Resp/CV/GI//MS/Neuro/Skin/Heme-Lymph-Imm. Modified physical exam through Telemedicine camera    General: Communicating well via camera   Neck: no obvious neck mass. No obvious neck deformity     CVS: no distress   Chest: no distress. Chest is moving with respiration    Extremities:  no visible tremor  Skin: No visible rashes as seen from camera   Musculoskeletal: no visible deformity  Neuro: Alert and oriented to person, place, and time. Psychiatric: Normal mood and affect.  Behavior is normal      Review of Laboratory Data:  I personally reviewed the following lab:  Lab Results   Component Value Date/Time    WBC 8.6 04/13/2018 01:15 AM    RBC 4.27 04/13/2018 01:15 AM    HGB 12.9 04/13/2018 01:15 AM    HCT 37.9 04/13/2018 01:15 AM    MCV 88.8 04/13/2018 01:15 AM    MCH 30.2 04/13/2018 01:15 AM    MCHC 34.0 04/13/2018 01:15 AM    RDW 12.7 04/13/2018 01:15 AM     04/13/2018 01:15 AM    MPV 9.8 04/13/2018 01:15 AM      Lab Results   Component Value Date/Time     03/16/2019 10:10 AM    K 4.6 03/16/2019 10:10 AM    CO2 31 (H) 03/16/2019 10:10 AM    BUN 7 03/16/2019 10:10 AM    CREATININE 0.9 03/16/2019 10:10 AM

## 2020-04-29 NOTE — PROGRESS NOTES
IBS (irritable bowel syndrome)     Insulin pump status has insulin pump    follows with Dr. Beryl Bose SIRS ([facility]ic inflammatory response syndrome) [facility] 2016    Thyroid disease     Trigger finger     right index and little finger       PAST SURGICAL HISTORY   Past Surgical History:   Procedure Laterality Date    CARPAL TUNNEL RELEASE  2011    left hand     DEBRIDEMENT Right 2017    Right lateral epicondylar debridement    ENDOSCOPY, COLON, DIAGNOSTIC      FINGER TRIGGER RELEASE      middle finger bilat, and right thumb  / multiple  / last one 2017    FINGER TRIGGER RELEASE Right 2016    4th finger    FINGER TRIGGER RELEASE Right 4/3/2019    TRIGGER RELEASE RIGHT INDEX AND SMALL FINGER performed by Carolina Kramer MD at [place] [address]  2009    exploratory      [facility] [address]  x2    , Low Cervical    SKIN BIOPSY         SOCIAL HISTORY   Tobacco:   reports that she has never smoked. She has never used smokeless tobacco.  Alcohol:   reports current alcohol use. Drugs:   reports no history of drug use. FAMILY HISTORY   Family History   Problem Relation Age of Onset    Cancer Mother     High Blood Pressure Maternal Grandmother        ALLERGIES AND DRUG REACTIONS   Allergies   Allergen Reactions    Penicillins Hives    Sulfa Antibiotics Rash     fever       CURRENT MEDICATIONS   Current Outpatient Medications   Medication Sig Dispense Refill    rosuvastatin (CRESTOR) 5 MG tablet Take 5 mg by mouth daily      levonorgestrel (MIRENA) IUD 52 mg by Intrauterine route      ARIPiprazole (ABILIFY) 2 MG tablet Take 2 mg by mouth daily      levothyroxine (SYNTHROID) 100 MCG tablet Take 100 mcg by mouth Daily      gabapentin (NEURONTIN) 100 MG capsule Take 300 mg by mouth nightly.  Takes nightly      losartan (COZAAR) 50 MG tablet Take 50 mg by mouth daily  0    insulin lispro (HUMALOG) 100 UNIT/ML injection vial Inject into the skin Indications: insulin pump controls amount of insulin To get pre-op instructions from Dr Genna Yu      DULoxetine (CYMBALTA) 30 MG capsule Take 60 mg by mouth daily Instructed to take am of procedure      pantoprazole (PROTONIX) 40 MG tablet Take 1 tablet by mouth daily for 10 days 10 tablet 0    Cyanocobalamin (VITAMIN B-12 PO) Take 2,500 mcg by mouth daily LD 3/28/2019      busPIRone (BUSPAR) 7.5 MG tablet Take 7.5 mg by mouth       Cholecalciferol (VITAMIN D3) 2000 UNITS CAPS Take 2 capsules by mouth daily       No current facility-administered medications for this visit. Review of Systems  Constitutional: No fever, no chills, no diaphoresis, no generalized weakness. HEENT: No blurred vision, No sore throat, no ear pain, no hair loss  Neck: denied any neck swelling, difficulty swallowing,   Cardio-pulmonary: No CP, SOB or palpitation, No orthopnea or PND. No cough or wheezing. GI: No N/V/D, no constipation, No abdominal pain, no melena or hematochezia   : Denied any dysuria, hematuria, flank pain, discharge, or incontinence. Skin: denied any rash, ulcer, Hirsute, or hyperpigmentation. MSK: denied any joint deformity, joint pain/swelling, muscle pain, or back pain. Neuro: no numbness, no tingling, no weakness, _    OBJECTIVE    There were no vitals taken for this visit. BP Readings from Last 4 Encounters:   10/16/19 119/79   04/11/19 130/80   04/03/19 (!) 151/89   04/03/19 132/76     Wt Readings from Last 6 Encounters:   10/16/19 152 lb (68.9 kg)   04/03/19 150 lb (68 kg)   02/04/19 145 lb (65.8 kg)   10/15/18 155 lb (70.3 kg)   05/22/18 140 lb (63.5 kg)   04/12/18 140 lb (63.5 kg)     Due to this being a TeleHealth encounter, evaluation of the following organ systems is limited: Vitals/Constitutional/EENT/Resp/CV/GI//MS/Neuro/Skin/Heme-Lymph-Imm. Modified physical exam through Telemedicine camera    General: Communicating well via camera   Neck: no obvious neck mass.  No obvious neck deformity     CVS: no distress seen in for the following issues     Diabetes Mellitus Type 1     · Patient's diabetes is under good control   · Continue using Omnipod insulin with DEXCOM CGM. Current pump settings:  Current pump settings: basal rate 12a 1.3, 8p 1, CR 15, 12a 140, ISF 40 , active insulin time 3 hrs    · Discussed with patient A1c and blood sugar goals   · Optimal blood sugars: 100-140 pre-prandial, < 180 peak post-prandial  · Patient up to date with the routine diabetes maintenance and prevention  · Discussed lifestyle changes including diet and exercise with patient; recommended 150 minutes of moderate intensity exercise per week. · Diabetes labs before next visit     Hypoglycemia   · Continue using DEXCOM CGM  · With Type 1 DM and hypothyroidism, I will check AM cortisol to r/o adrenal insufficiency      Primary Hypothyroidism   · Continue Levothyroxine 100 mcg daily  · Check TFT before next visit     Return in about 3 months (around 8/3/2020) for DM type 1 on Omnipod pump, Hypothyroidism . The above issues were reviewed with the patient who understood and agreed with the plan. Thank you for allowing us to participate in the care of this patient. Please do not hesitate to contact us with any additional questions. Diagnosis Orders   1. Hypothyroidism, unspecified type  TSH without Reflex    T4, Free   2. Type 1 diabetes mellitus without complication (HCC)  Basic Metabolic Panel    Hemoglobin A1C    Lipid Panel    Microalbumin / Creatinine Urine Ratio   3. Vitamin D deficiency  Vitamin D 25 Hydroxy   4. Hypoglycemia  Cortisol Total     Ariela Todd MD  Endocrinologist, [facility] Diabetes Care and Endocrinology   [address] [place], [place] [address]   Phone: [phone]  Fax: [phone]  --------------------------------------------  An electronic signature was used to authenticate this note.  Davion Estes MD on 4/29/2020 at 8:09 AM

## 2020-05-11 RX ORDER — LEVOTHYROXINE SODIUM 88 UG/1
88 TABLET ORAL DAILY
Qty: 30 TABLET | Refills: 5 | Status: SHIPPED
Start: 2020-05-11 | End: 2020-10-05

## 2020-06-17 RX ORDER — INSULIN PUMP CONTROLLER
EACH MISCELLANEOUS
Qty: 1 KIT | Refills: 0 | Status: SHIPPED | OUTPATIENT
Start: 2020-06-17

## 2020-06-17 RX ORDER — INSULIN PUMP CONTROLLER
EACH MISCELLANEOUS
Qty: 30 EACH | Refills: 3 | Status: SHIPPED | OUTPATIENT
Start: 2020-06-17 | End: 2021-01-20 | Stop reason: SDUPTHER

## 2020-07-18 ENCOUNTER — HOSPITAL ENCOUNTER (OUTPATIENT)
Age: 40
Discharge: HOME | End: 2020-07-18
Payer: COMMERCIAL

## 2020-07-18 LAB
ANION GAP SERPL CALCULATED.3IONS-SCNC: 12 MMOL/L (ref 7–16)
BUN BLDV-MCNC: 8 MG/DL (ref 6–20)
CALCIUM SERPL-MCNC: 9.4 MG/DL (ref 8.6–10.2)
CHLORIDE BLD-SCNC: 101 MMOL/L (ref 98–107)
CHOLESTEROL, TOTAL: 170 MG/DL (ref 0–199)
CO2: 26 MMOL/L (ref 22–29)
CORTISOL TOTAL: 10.75 MCG/DL (ref 2.68–18.4)
CREAT SERPL-MCNC: 1 MG/DL (ref 0.5–1)
CREATININE URINE: 119 MG/DL (ref 29–226)
GFR AFRICAN AMERICAN: >60
GFR NON-AFRICAN AMERICAN: >60 ML/MIN/1.73
GLUCOSE BLD-MCNC: 130 MG/DL (ref 74–99)
HBA1C MFR BLD: 7.7 % (ref 4–5.6)
HDLC SERPL-MCNC: 60 MG/DL
LDL CHOLESTEROL CALCULATED: 97 MG/DL (ref 0–99)
MICROALBUMIN UR-MCNC: <12 MG/L
MICROALBUMIN/CREAT UR-RTO: ABNORMAL (ref 0–30)
POTASSIUM SERPL-SCNC: 4 MMOL/L (ref 3.5–5)
SODIUM BLD-SCNC: 139 MMOL/L (ref 132–146)
T4 FREE: 1.08 NG/DL (ref 0.93–1.7)
TRIGL SERPL-MCNC: 67 MG/DL (ref 0–149)
TSH SERPL DL<=0.05 MIU/L-ACNC: 3.56 UIU/ML (ref 0.27–4.2)
VITAMIN D 25-HYDROXY: 27 NG/ML (ref 30–100)
VLDLC SERPL CALC-MCNC: 13 MG/DL

## 2020-07-18 PROCEDURE — 84443 ASSAY THYROID STIM HORMONE: CPT

## 2020-07-18 PROCEDURE — 80048 BASIC METABOLIC PNL TOTAL CA: CPT

## 2020-07-18 PROCEDURE — 82570 ASSAY OF URINE CREATININE: CPT

## 2020-07-18 PROCEDURE — 36415 COLL VENOUS BLD VENIPUNCTURE: CPT

## 2020-07-18 PROCEDURE — 82044 UR ALBUMIN SEMIQUANTITATIVE: CPT

## 2020-07-18 PROCEDURE — 82306 VITAMIN D 25 HYDROXY: CPT

## 2020-07-18 PROCEDURE — 80061 LIPID PANEL: CPT

## 2020-07-18 PROCEDURE — 82533 TOTAL CORTISOL: CPT

## 2020-07-18 PROCEDURE — 84439 ASSAY OF FREE THYROXINE: CPT

## 2020-07-18 PROCEDURE — 83036 HEMOGLOBIN GLYCOSYLATED A1C: CPT

## 2020-07-29 ENCOUNTER — VIRTUAL VISIT (OUTPATIENT)
Dept: ENDOCRINOLOGY | Age: 40
End: 2020-07-29
Payer: COMMERCIAL

## 2020-07-29 PROCEDURE — 99214 OFFICE O/P EST MOD 30 MIN: CPT | Performed by: INTERNAL MEDICINE

## 2020-07-29 PROCEDURE — 3051F HG A1C>EQUAL 7.0%<8.0%: CPT | Performed by: INTERNAL MEDICINE

## 2020-07-29 NOTE — PROGRESS NOTES
[address] [place] [address] Department of Endocrinology Diabetes and Metabolism   [address] [facility], [address] [place] [address],[address] [id number]   Phone: [phone]  Fax: [phone]    Date of Service: 7/29/2020    Primary Care Physician: Angélica Donaldson DO  Provider: Deepika Berry MD     Reason for the visit:  DM type 1 on Omnipod insulin pump, hypothyroidism     History of Present Illness: The history is provided by the patient. No  was used. Accuracy of the patient data is excellent. Gabriel Carrero is a very pleasant 35 y.o. female seen today for follow up visit     Gabriel Carrero was diagnosed with diabetes at age 2   The patient is currently on Omnipod pump DEXCOM cgm 6.  Current pump settings: basal rate 12a 1.3, 8p 1, CR 15, 12a 140, ISF 40 , active insulin time 3 hrs    The patient has been checking blood sugar multiple times a day using DEXCOM and most readings at goal   Lab Results   Component Value Date    LABA1C 7.7 07/18/2020    LABA1C 6.6 03/16/2019    LABA1C 6.4 06/20/2018     Patient reported hypoglycemic episodes, usually in am   The patient has been mindful of what has been eating and following diabetic diet as encouraged  I reviewed current medications and the patient has no issues with diabetes medications  Gabriel Carrero is up to date with eye exam and denied any history of diabetic retinopathy   The patient seeing performs her own feet care  Microvascular complications:  No Retinopathy, no Nephropathy + Neuropathy   Macrovascular complications: no CAD, PVD, or Stroke  The patient receives Flushot every year    PAST MEDICAL HISTORY   Past Medical History:   Diagnosis Date    Anxiety     Depression     Diabetes mellitus type 1, controlled, without complications ([facility] 7/0/3739    Epicondylitis, lateral 02/2017    right    GERD (gastroesophageal reflux disease)     Hypertension     IBS (irritable bowel syndrome)     Insulin pump status has insulin pump    follows daily      levonorgestrel (MIRENA) IUD 52 mg by Intrauterine route      ARIPiprazole (ABILIFY) 2 MG tablet Take 2 mg by mouth daily      Cyanocobalamin (VITAMIN B-12 PO) Take 2,500 mcg by mouth daily LD 3/28/2019      busPIRone (BUSPAR) 7.5 MG tablet Take 7.5 mg by mouth       Cholecalciferol (VITAMIN D3) 2000 UNITS CAPS Take 2 capsules by mouth daily      gabapentin (NEURONTIN) 100 MG capsule Take 300 mg by mouth nightly. Takes nightly      losartan (COZAAR) 50 MG tablet Take 50 mg by mouth daily  0    DULoxetine (CYMBALTA) 30 MG capsule Take 60 mg by mouth daily Instructed to take am of procedure       No current facility-administered medications for this visit. Review of Systems  Constitutional: No fever, no chills, no diaphoresis, no generalized weakness. HEENT: No blurred vision, No sore throat, no ear pain, no hair loss  Neck: denied any neck swelling, difficulty swallowing,   Cardio-pulmonary: No CP, SOB or palpitation, No orthopnea or PND. No cough or wheezing. GI: No N/V/D, no constipation, No abdominal pain, no melena or hematochezia   : Denied any dysuria, hematuria, flank pain, discharge, or incontinence. Skin: denied any rash, ulcer, Hirsute, or hyperpigmentation. MSK: denied any joint deformity, joint pain/swelling, muscle pain, or back pain. Neuro: no numbness, no tingling, no weakness, _    OBJECTIVE    There were no vitals taken for this visit. BP Readings from Last 4 Encounters:   10/16/19 119/79   04/11/19 130/80   04/03/19 (!) 151/89   04/03/19 132/76     Wt Readings from Last 6 Encounters:   10/16/19 152 lb (68.9 kg)   04/03/19 150 lb (68 kg)   02/04/19 145 lb (65.8 kg)   10/15/18 155 lb (70.3 kg)   05/22/18 140 lb (63.5 kg)   04/12/18 140 lb (63.5 kg)     Physical examination:  Due to this being a TeleHealth encounter, evaluation of the following organ systems is limited: EENT/Resp/CV/GI//MS/Neuro/Skin/Heme-Lymph-Imm.     General: awake alert, oriented x3, no abnormal position or movements.   Pulm: move with respiration   Skin: no rash  Psych: normal mood, and affect    Review of Laboratory Data:  I personally reviewed the following lab:  Lab Results   Component Value Date/Time    WBC 8.6 04/13/2018 01:15 AM    RBC 4.27 04/13/2018 01:15 AM    HGB 12.9 04/13/2018 01:15 AM    HCT 37.9 04/13/2018 01:15 AM    MCV 88.8 04/13/2018 01:15 AM    MCH 30.2 04/13/2018 01:15 AM    MCHC 34.0 04/13/2018 01:15 AM    RDW 12.7 04/13/2018 01:15 AM     04/13/2018 01:15 AM    MPV 9.8 04/13/2018 01:15 AM      Lab Results   Component Value Date/Time     07/18/2020 09:30 AM    K 4.0 07/18/2020 09:30 AM    CO2 26 07/18/2020 09:30 AM    BUN 8 07/18/2020 09:30 AM    CREATININE 1.0 07/18/2020 09:30 AM    CALCIUM 9.4 07/18/2020 09:30 AM    LABGLOM >60 07/18/2020 09:30 AM    GFRAA >60 07/18/2020 09:30 AM      Lab Results   Component Value Date/Time    TSH 3.560 07/18/2020 09:30 AM    T4FREE 1.08 07/18/2020 09:30 AM    FT3 2.8 12/03/2015 07:00 AM    W8JMYQC 103.80 12/03/2015 07:00 AM     Lab Results   Component Value Date    LABA1C 7.7 07/18/2020    GLUCOSE 130 07/18/2020    MALBCR - 07/18/2020    LABMICR <12.0 07/18/2020    LABCREA 119 07/18/2020     Lab Results   Component Value Date    LABA1C 7.7 07/18/2020    LABA1C 6.6 03/16/2019    LABA1C 6.4 06/20/2018     Lab Results   Component Value Date    TRIG 67 07/18/2020    HDL 60 07/18/2020    LDLCALC 97 07/18/2020    CHOL 170 07/18/2020     Lab Results   Component Value Date    VITD25 27 07/18/2020    VITD25 13 03/16/2019       Medical Records/Labs/Images review:   I personally reviewed and summarized previous records   All labs and imaging studies were independently reviewed     [address], a 35 y.o.-old female seen in for the following issues     Diabetes Mellitus Type 1     · Patient's diabetes is under good control   · On Omnipod insulin pump with DEXCOM CGM  · Current pump settings:  Current pump settings: basal rate 12a 1.3, 8p 1, CR 15, 12a 140, ISF 40 , active insulin time 3 hrs  · To bring her pump for download in few days to adjust pump settings     · Discussed with patient A1c and blood sugar goals   · Patient up to date with the routine diabetes maintenance and prevention  · Diabetes labs before next visit     Hypoglycemia   · Continue using DEXCOM CGM  · With Type 1 DM and hypothyroidism, I checked AM cortisol and wasgood     Primary Hypothyroidism   · Currently on Levothyroxine 88 mcg daily  · Will switch to name brand synthroid and see if she will feel better on it     Return in about 4 months (around 11/29/2020) for DM type 1 on insulin pump, Hypothyroidism . The above issues were reviewed with the patient who understood and agreed with the plan. Thank you for allowing us to participate in the care of this patient. Please do not hesitate to contact us with any additional questions. Diagnosis Orders   1. Type 1 diabetes mellitus without complication ([facility] [address] [address]     2. Insulin pump in place     3. Hypothyroidism, unspecified type     4. Vitamin D deficiency     5. Hypoglycemia       Mayur Hill MD  Endocrinologist, [facility] Diabetes Care and Endocrinology   [address] [place] [address]   Phone: [phone]  Fax: [phone]  ----------------------------  An electronic signature was used to authenticate this note. Bruce Roth MD on 7/29/2020 at 12:44 PM  Services were provided through a video synchronous discussion virtually to substitute for in-person clinic visit. Pursuant to the emergency declaration under the [facility], [address]5 waiver authority and the [employer] Act, this Virtual  Visit was conducted, with patient's consent, to reduce the patient's risk of exposure to COVID-19 and provide continuity of care.

## 2020-07-29 NOTE — PROGRESS NOTES
Josue Kennedy was read the following message We want to confirm that, for purposes of billing, this is a virtual visit with your provider for which we will submit a claim for reimbursement with your insurance company. You will be responsible for any copays, coinsurance amounts or other amounts not covered by your insurance company. If you do not accept this, unfortunately we will not be able to schedule a virtual visit with the provider. Do you accept?  Batsheva Hsu responded YES

## 2020-10-05 RX ORDER — LEVOTHYROXINE SODIUM 88 UG/1
88 TABLET ORAL DAILY
Qty: 90 TABLET | Refills: 3 | Status: SHIPPED
Start: 2020-10-05 | End: 2021-09-30

## 2020-11-10 PROBLEM — Z91.89 AT HIGH RISK FOR BREAST CANCER: Status: ACTIVE | Noted: 2020-11-10

## 2020-11-30 ENCOUNTER — OFFICE VISIT (OUTPATIENT)
Dept: ENDOCRINOLOGY | Age: 40
End: 2020-11-30
Payer: COMMERCIAL

## 2020-11-30 ENCOUNTER — TELEPHONE (OUTPATIENT)
Dept: ENDOCRINOLOGY | Age: 40
End: 2020-11-30

## 2020-11-30 VITALS
DIASTOLIC BLOOD PRESSURE: 72 MMHG | WEIGHT: 168 LBS | HEART RATE: 97 BPM | OXYGEN SATURATION: 98 % | SYSTOLIC BLOOD PRESSURE: 118 MMHG | TEMPERATURE: 97.3 F | BODY MASS INDEX: 28.84 KG/M2

## 2020-11-30 LAB — HBA1C MFR BLD: 7 %

## 2020-11-30 PROCEDURE — 83036 HEMOGLOBIN GLYCOSYLATED A1C: CPT | Performed by: INTERNAL MEDICINE

## 2020-11-30 PROCEDURE — 3051F HG A1C>EQUAL 7.0%<8.0%: CPT | Performed by: INTERNAL MEDICINE

## 2020-11-30 PROCEDURE — 99214 OFFICE O/P EST MOD 30 MIN: CPT | Performed by: INTERNAL MEDICINE

## 2020-11-30 NOTE — PROGRESS NOTES
[address] [place] [address] Department of Endocrinology Diabetes and Metabolism   [address] [facility] [address]   Phone: [phone]  Fax: [phone]    Date of Service: 11/30/2020    Primary Care Physician: Florencio Meier DO  Provider: Magan Rodriguez MD     Reason for the visit:  DM type 1 on Omnipod insulin pump, hypothyroidism     History of Present Illness: The history is provided by the patient. No  was used. Accuracy of the patient data is excellent. Hernando King is a very pleasant 38 y.o. female seen today for follow up visit     Hernando King was diagnosed with diabetes at age 2   The patient is currently on Omnipod pump DEXCOM cgm 6. Current pump settings: basal rate 12a 1.3, 8p 1, CR 15, 12a 140, ISF 40 , active insulin time 3 hrs    The patient has been checking blood sugar multiple times a day using DEXCOM and most readings at goal   Lab Results   Component Value Date    LABA1C 7.0 11/30/2020    LABA1C 7.7 07/18/2020    LABA1C 6.6 03/16/2019     Patient reported hypoglycemic episodes, usually in am   The patient has been mindful of what has been eating and following diabetic diet as encouraged  I reviewed current medications and the patient has no issues with diabetes medications  Hernando King is up to date with eye exam and denied any history of diabetic retinopathy   The patient seeing performs her own feet care  Microvascular complications:  No Retinopathy, no Nephropathy + Neuropathy   Macrovascular complications: no CAD, PVD, or Stroke  The patient receives Flushot every year    Hypothyroidism    On levothyroxine 88 mcg daily. Patient takes levothyroxine in the morning at empty stomach, wait one hour before eating , avoid multivitamins containing calcium  or iron with it.   Lab Results   Component Value Date/Time    TSH 3.560 07/18/2020 09:30 AM    T4FREE 1.08 07/18/2020 09:30 AM    FT3 2.8 12/03/2015 07:00 AM    J1FKYWC 103.80 2015 07:00 AM         PAST MEDICAL HISTORY   Past Medical History:   Diagnosis Date    Anxiety     Depression     Diabetes mellitus type 1, controlled, without complications ([facility] [address] 7/3/4048    Epicondylitis, lateral 2017    right    GERD (gastroesophageal reflux disease)     Hypertension     IBS (irritable bowel syndrome)     Insulin pump status has insulin pump    follows with Dr. Nancy Butler SIRS (systemic inflammatory response syndrome) ([facility] 2016    Thyroid disease     Trigger finger     right index and little finger       PAST SURGICAL HISTORY   Past Surgical History:   Procedure Laterality Date    CARPAL TUNNEL RELEASE  2011    left hand     DEBRIDEMENT Right 2017    Right lateral epicondylar debridement    ENDOSCOPY, COLON, DIAGNOSTIC      FINGER TRIGGER RELEASE      middle finger bilat, and right thumb  / multiple  / last one 2017    FINGER TRIGGER RELEASE Right 2016    4th finger    FINGER TRIGGER RELEASE Right 4/3/2019    TRIGGER RELEASE RIGHT INDEX AND SMALL FINGER performed by Maribel Guajardo MD at [place] [address]    exploratory      [facility]  x2    , Low Cervical    SKIN BIOPSY         SOCIAL HISTORY   Tobacco:   reports that she has never smoked. She has never used smokeless tobacco.  Alcohol:   reports current alcohol use. Drugs:   reports no history of drug use. FAMILY HISTORY   Family History   Problem Relation Age of Onset    Cancer Mother     High Blood Pressure Maternal Grandmother        ALLERGIES AND DRUG REACTIONS   Allergies   Allergen Reactions    Penicillins Hives    Sulfa Antibiotics Rash     fever       CURRENT MEDICATIONS   Current Outpatient Medications   Medication Sig Dispense Refill    levothyroxine (SYNTHROID) 88 MCG tablet TAKE 1 TABLET BY MOUTH DAILY 90 tablet 3    insulin aspart (NOVOLOG) 100 UNIT/ML injection vial Use via insulin pump.  Max 60 units a day 2 vial 5    blood glucose test strips (ASCENSIA AUTODISC VI;ONE TOUCH ULTRA TEST VI) strip Use to check blood sugar 4 times a day 200 each 3    Insulin Disposable Pump (OMNIPOD DASH 5 PACK PODS) MISC To continuously administer insulin. The pt changes q3days 30 each 3    Insulin Disposable Pump (OMNIPOD DASH SYSTEM) KIT To continuously administer inuslin 1 kit 0    rosuvastatin (CRESTOR) 5 MG tablet Take 5 mg by mouth daily      levonorgestrel (MIRENA) IUD 52 mg by Intrauterine route      ARIPiprazole (ABILIFY) 2 MG tablet Take 2 mg by mouth daily      Cyanocobalamin (VITAMIN B-12 PO) Take 2,500 mcg by mouth daily LD 3/28/2019      busPIRone (BUSPAR) 7.5 MG tablet Take 7.5 mg by mouth       Cholecalciferol (VITAMIN D3) 2000 UNITS CAPS Take 2 capsules by mouth daily      gabapentin (NEURONTIN) 100 MG capsule Take 300 mg by mouth nightly. Takes nightly      losartan (COZAAR) 50 MG tablet Take 50 mg by mouth daily  0    DULoxetine (CYMBALTA) 30 MG capsule Take 60 mg by mouth daily Instructed to take am of procedure       No current facility-administered medications for this visit. Review of Systems  Constitutional: No fever, no chills, no diaphoresis, no generalized weakness. HEENT: No blurred vision, No sore throat, no ear pain, no hair loss  Neck: denied any neck swelling, difficulty swallowing,   Cardio-pulmonary: No CP, SOB or palpitation, No orthopnea or PND. No cough or wheezing. GI: No N/V/D, no constipation, No abdominal pain, no melena or hematochezia   : Denied any dysuria, hematuria, flank pain, discharge, or incontinence. Skin: denied any rash, ulcer, Hirsute, or hyperpigmentation. MSK: denied any joint deformity, joint pain/swelling, muscle pain, or back pain.   Neuro: no numbness, no tingling, no weakness, _    OBJECTIVE    /72   Pulse 97   Temp 97.3 °F (36.3 °C)   Wt 168 lb (76.2 kg)   SpO2 98%   BMI 28.84 kg/m²   BP Readings from Last 4 Encounters:   11/30/20 118/72   10/19/20 132/82   10/16/19 Results   Component Value Date    LABA1C 7.0 11/30/2020    LABA1C 7.7 07/18/2020    LABA1C 6.6 03/16/2019     Lab Results   Component Value Date    TRIG 67 07/18/2020    HDL 60 07/18/2020    LDLCALC 97 07/18/2020    CHOL 170 07/18/2020     Lab Results   Component Value Date    VITD25 27 07/18/2020    VITD25 13 03/16/2019       Medical Records/Labs/Images review:   I personally reviewed and summarized previous records   All labs and imaging studies were independently reviewed     [address] [facility], a 38 y.o.-old female seen in for the following issues     Diabetes Mellitus Type 1     · Patient's diabetes is under good control   · On Omnipod insulin pump with DEXCOM CGM  · Will change pump settings: basal rate 12a 1.3, 8p 1, CR 15, 12a 140, ISF 40 , active insulin time 3 hrs   · To bring her pump for download in few days to adjust pump settings     · Discussed with patient A1c and blood sugar goals   · Patient up to date with the routine diabetes maintenance and prevention  · Diabetes labs before next visit     Hypoglycemia   · Continue using DEXCOM CGM  · With Type 1 DM and hypothyroidism, I checked AM cortisol and wasgood     Primary Hypothyroidism   · Continue Levothyroxine 88 mcg daily    Return in about 4 months (around 3/30/2021) for DM type 1 on Omnipod inuslin pump , Hypothyroidism . The above issues were reviewed with the patient who understood and agreed with the plan. Thank you for allowing us to participate in the care of this patient. Please do not hesitate to contact us with any additional questions. Diagnosis Orders   1. Type 1 diabetes mellitus without complication (HCC)  POCT glycosylated hemoglobin (Hb A1C)    Comprehensive Metabolic Panel    Hemoglobin A1C    Lipid Panel    Microalbumin / Creatinine Urine Ratio   2. Hypothyroidism, unspecified type  TSH without Reflex    T4, Free   3. Vitamin D deficiency  Vitamin D 25 Hydroxy   4. Insulin pump in place     5. Hypoglycemia       Liberty Lilly MD  Endocrinologist, [facility] Diabetes Care and Endocrinology   [address] [facility] [address]   Phone: [phone]  Fax: [phone]  ----------------------------  An electronic signature was used to authenticate this note.  Raul Gunn MD on 11/30/2020 at 7:44 PM

## 2021-01-20 DIAGNOSIS — E10.9 TYPE 1 DIABETES MELLITUS WITHOUT COMPLICATION (HCC): Primary | ICD-10-CM

## 2021-01-20 RX ORDER — INSULIN PUMP CONTROLLER
EACH MISCELLANEOUS
Qty: 30 EACH | Refills: 3 | Status: SHIPPED
Start: 2021-01-20 | End: 2022-01-03

## 2021-01-23 DIAGNOSIS — E10.9 TYPE 1 DIABETES MELLITUS WITHOUT COMPLICATION (HCC): ICD-10-CM

## 2021-01-25 RX ORDER — BLOOD SUGAR DIAGNOSTIC
STRIP MISCELLANEOUS
Qty: 150 STRIP | Refills: 11 | Status: SHIPPED
Start: 2021-01-25 | End: 2022-01-25

## 2021-03-02 ENCOUNTER — TELEPHONE (OUTPATIENT)
Dept: ORTHOPEDIC SURGERY | Age: 41
End: 2021-03-02

## 2021-03-02 ENCOUNTER — OFFICE VISIT (OUTPATIENT)
Dept: ORTHOPEDIC SURGERY | Age: 41
End: 2021-03-02
Payer: MEDICAID

## 2021-03-02 VITALS — HEIGHT: 64 IN | BODY MASS INDEX: 23.9 KG/M2 | RESPIRATION RATE: 18 BRPM | WEIGHT: 140 LBS

## 2021-03-02 DIAGNOSIS — G56.02 CARPAL TUNNEL SYNDROME OF LEFT WRIST: Primary | ICD-10-CM

## 2021-03-02 PROCEDURE — 99213 OFFICE O/P EST LOW 20 MIN: CPT | Performed by: ORTHOPAEDIC SURGERY

## 2021-03-02 PROCEDURE — 20526 THER INJECTION CARP TUNNEL: CPT | Performed by: ORTHOPAEDIC SURGERY

## 2021-03-02 RX ORDER — PANTOPRAZOLE SODIUM 40 MG/1
40 TABLET, DELAYED RELEASE ORAL DAILY
COMMUNITY
End: 2021-12-09

## 2021-03-02 RX ORDER — DEXAMETHASONE SODIUM PHOSPHATE 4 MG/ML
4 INJECTION, SOLUTION INTRA-ARTICULAR; INTRALESIONAL; INTRAMUSCULAR; INTRAVENOUS; SOFT TISSUE ONCE
Status: COMPLETED | OUTPATIENT
Start: 2021-03-02 | End: 2021-03-02

## 2021-03-02 RX ADMIN — DEXAMETHASONE SODIUM PHOSPHATE 4 MG: 4 INJECTION, SOLUTION INTRA-ARTICULAR; INTRALESIONAL; INTRAMUSCULAR; INTRAVENOUS; SOFT TISSUE at 16:00

## 2021-03-02 NOTE — PROGRESS NOTES
Chief Complaint   Patient presents with    Carpal Tunnel     Left wrist CTS, requesting injection         Sandra Araujo is a 38y.o. year old  who presents with worsening numbness and tingling of the left hand. She is status post a previous right carpal tunnel release with good results. She reports over the past 6 months or so worsening numbness tingling and pain in the left hand. She is having difficulty at work with typing. She also has nocturnal symptoms. She reports these are similar to her symptoms that she had in the past.  She did have a previous EMG and nerve conduction today completed in 2016. This was done at the [facility] [place] demonstrating a left motor latency of 4.4 and a right sensory latency of 3.6. No significant changes on the EMG portion of the study.       Past Medical History:   Diagnosis Date    Anxiety     Depression     Diabetes mellitus type 1, controlled, without complications ([facility] [address] 3/3/7540    Epicondylitis, lateral 2017    right    GERD (gastroesophageal reflux disease)     Hypertension     IBS (irritable bowel syndrome)     Insulin pump status has insulin pump    follows with Dr. Ela Dozier SIRS (systemic inflammatory response syndrome) ([facility] [address] 2016    Thyroid disease     Trigger finger     right index and little finger     Past Surgical History:   Procedure Laterality Date    CARPAL TUNNEL RELEASE      left hand     DEBRIDEMENT Right 2017    Right lateral epicondylar debridement    ENDOSCOPY, COLON, DIAGNOSTIC      FINGER TRIGGER RELEASE      middle finger bilat, and right thumb  / multiple  / last one 2017    FINGER TRIGGER RELEASE Right 2016    4th finger    FINGER TRIGGER RELEASE Right 4/3/2019    TRIGGER RELEASE RIGHT INDEX AND SMALL FINGER performed by Boyd Stanley MD at [place] [address]  2009    exploratory      [facility]  x2    , Low Cervical    SKIN BIOPSY         Current Outpatient Medications:     pantoprazole (PROTONIX) 40 MG tablet, Take 40 mg by mouth daily, Disp: , Rfl:     insulin aspart (NOVOLOG) 100 UNIT/ML injection vial, USE VIA INSULIN PUMP. MAX 60 UNITS PER DAY., Disp: 6 vial, Rfl: 3    blood glucose test strips (ONETOUCH ULTRA) strip, CHECK BLOOD SUGAR FOUR TIMES DAILY, Disp: 150 strip, Rfl: 11    Insulin Disposable Pump (OMNIPOD DASH 5 PACK PODS) MISC, To continuously administer insulin. The pt changes q3days, Disp: 30 each, Rfl: 3    levothyroxine (SYNTHROID) 88 MCG tablet, TAKE 1 TABLET BY MOUTH DAILY, Disp: 90 tablet, Rfl: 3    Insulin Disposable Pump (OMNIPOD DASH SYSTEM) KIT, To continuously administer inuslin, Disp: 1 kit, Rfl: 0    levonorgestrel (MIRENA) IUD 52 mg, by Intrauterine route, Disp: , Rfl:     ARIPiprazole (ABILIFY) 2 MG tablet, Take 2 mg by mouth daily, Disp: , Rfl:     Cholecalciferol (VITAMIN D3) 2000 UNITS CAPS, Take 2 capsules by mouth daily, Disp: , Rfl:     gabapentin (NEURONTIN) 100 MG capsule, Take 300 mg by mouth nightly.  Takes nightly, Disp: , Rfl:     losartan (COZAAR) 50 MG tablet, Take 50 mg by mouth daily, Disp: , Rfl: 0    DULoxetine (CYMBALTA) 30 MG capsule, Take 60 mg by mouth daily Instructed to take am of procedure, Disp: , Rfl:     rosuvastatin (CRESTOR) 5 MG tablet, Take 5 mg by mouth daily, Disp: , Rfl:     Cyanocobalamin (VITAMIN B-12 PO), Take 2,500 mcg by mouth daily LD 3/28/2019, Disp: , Rfl:     busPIRone (BUSPAR) 7.5 MG tablet, Take 7.5 mg by mouth , Disp: , Rfl:   Allergies   Allergen Reactions    Penicillins Hives    Sulfa Antibiotics Rash     fever     Social History     Socioeconomic History    Marital status:      Spouse name: Not on file    Number of children: Not on file    Years of education: Not on file    Highest education level: Not on file   Occupational History    Not on file   Social Needs    Financial resource strain: Not on file    Food insecurity     Worry: Not on file Inability: Not on file    Transportation needs     Medical: Not on file     Non-medical: Not on file   Tobacco Use    Smoking status: Never Smoker    Smokeless tobacco: Never Used   Substance and Sexual Activity    Alcohol use: Yes     Alcohol/week: 0.0 standard drinks     Comment: socially    Drug use: No    Sexual activity: Not on file   Lifestyle    Physical activity     Days per week: Not on file     Minutes per session: Not on file    Stress: Not on file   Relationships    Social connections     Talks on phone: Not on file     Gets together: Not on file     Attends Jain service: Not on file     Active member of club or organization: Not on file     Attends meetings of clubs or organizations: Not on file     Relationship status: Not on file    Intimate partner violence     Fear of current or ex partner: Not on file     Emotionally abused: Not on file     Physically abused: Not on file     Forced sexual activity: Not on file   Other Topics Concern    Not on file   Social History Narrative    Not on file     Family History   Problem Relation Age of Onset    Cancer Mother     High Blood Pressure Maternal Grandmother        Skin: (-) rash,(-) psoriasis,(-) eczema, (-)skin cancer. Musculoskeletal: Left hand pain  Neurologic: (-) epilepsy, (-)seizures,(-) brain tumor,(-) TIA, (-)stroke, (-)headaches, (-)Parkinson disease,(-) memory loss, (-) LOC. Cardiovascular: (-) Chest pain, (-) swelling in legs/feet, (-) SOB, (-) cramping in legs/feet with walking. SUBJECTIVE:      Constitutional:    The patient is alert and oriented x 3, appears to be stated age and in no distress. Left upper extremity: Nontender about the shoulder and elbow region. Hypersensitivity over the carpal tunnel with positive Tinel's. Positive Phalen's and modified Durkan's maneuver. Negative atrophy. APB and intrinsic strength 5/5. Negative Wartenberg's cross finger testing.   Full digital flexion and extension of the thumb index middle ring and small fingers. Nontenderness over the A1 pulleys negative triggering. Radial, ulnar, median nerves are grossly intact with subjectively decreased sensation in the median nerve distribution. Brisk cap refill of digits. 2+ radial pulse. Positive Tinel's over the cubital tunnel. Xrays: X-rays of the left wrist were obtained today in the office AP lateral bleak's and carpal tunnel views were negative for any acute fracture dislocation. Carpal alignment is well-preserved. Impression office x-rays: Negative for acute fracture dislocations of the left wrist  Radiographic findings reviewed with patient      Impression:   Encounter Diagnosis   Name Primary?  Carpal tunnel syndrome of left wrist Yes   Depression, anxiety  Diabetes, on insulin pump  Hypertension  Thyroid disease  GERD    Plan: Today's findings were explained the patient. Treatment options were reviewed. This time she like to have a cortisone injection for carpal tunnel syndrome. A new EMG and nerve conduction titer was recommended and ordered. Patient follow-up after these tests are completed      Procedure Note Carpal Tunnel Injection    The left carpal tunnel was identified as the injection site. The risk and benefits of a cortisone injection were explained and the patient consented to the injection. Under sterile conditions, the carpal canal was injected with a mixture of 1 mL of 1% Lidocaine and 1 mL of 4 mg/mL Dexamethasone without complication. A sterile bandage was applied.

## 2021-03-16 ENCOUNTER — HOSPITAL ENCOUNTER (OUTPATIENT)
Dept: NEUROLOGY | Age: 41
Discharge: HOME | End: 2021-03-16
Payer: COMMERCIAL

## 2021-03-16 VITALS — HEIGHT: 64 IN | BODY MASS INDEX: 27.31 KG/M2 | WEIGHT: 160 LBS

## 2021-03-16 DIAGNOSIS — G56.02 CARPAL TUNNEL SYNDROME OF LEFT WRIST: ICD-10-CM

## 2021-03-16 PROCEDURE — 95886 MUSC TEST DONE W/N TEST COMP: CPT

## 2021-03-16 PROCEDURE — 95909 NRV CNDJ TST 5-6 STUDIES: CPT

## 2021-03-16 PROCEDURE — 95909 NRV CNDJ TST 5-6 STUDIES: CPT | Performed by: PHYSICAL MEDICINE & REHABILITATION

## 2021-03-16 PROCEDURE — 95886 MUSC TEST DONE W/N TEST COMP: CPT | Performed by: PHYSICAL MEDICINE & REHABILITATION

## 2021-03-16 NOTE — PROCEDURES
[address] [facility]  [address], [address]  Phone: [phone]  Fax: [phone]      Referring Provider: Merna Wilson MD  Primary Care Physician: Jose Rojas DO  Patient Name: David Ram  Patient YOB: 1980  Gender: female  BMI: Body mass index is 27.46 kg/m². Height 5' 4\" (1.626 m), weight 160 lb (72.6 kg), not currently breastfeeding. 3/16/2021    Description of clinical problem:   CC: numbness left hand    Patient reports numbness in left D3-5. Reports left elbow pain. No weakness. Brief physical exam:   Sensory deficit No; Weakness No; Atrophy  No    Motor NCS      Nerve / Sites Lat. Amplitude Amp. 1-2 Distance Velocity Temp.    ms mV % cm m/s °C   L Median - APB      Wrist 4.79 8.6 100 8  32.2      Elbow 8.39 7.9 90.9 18 50 32.2   L Ulnar - ADM      Wrist 2.71 14.0 100 8  32.2      B. Elbow 5.89 13.9 99.2 18 57 32.4      A. Elbow 7.81 13.0 93.2 10 52 32.2       Sensory NCS      Nerve / Sites Peak Lat PP Amp Distance Velocity Temp. ms µV cm m/s °C   L Median - Digit II (Antidromic)      Mid Palm 1.98 44.4 7 64 32.9      Wrist 4.17 32.4 14 42 32.9   L Ulnar - Digit V (Antidromic)      Wrist 3.13 29.6 14 61 32.9   L Radial - Anatomical snuff box (Forearm)      Forearm 2.50 27.7 10 53 32.2   L Dorsal ulnar cutaneous - Hand dorsum (Forearm)      Forearm 2.66 19.3 8 43 33       F  Wave      Nerve F Lat M Lat F-M Lat    ms ms ms   L Median - APB 27.9 4.6 23.3   L Ulnar - ADM 27.9 2.7 25.2       EMG            EMG Summary Table     Spontaneous MUAP Recruitment   Muscle IA Fib PSW Fasc H.F. Amp Dur. PPP Pattern   L. Biceps brachii N None None None None N N N N   L. Triceps brachii Incr None None None None N N N Decr   L. Pronator teres N None None None None N N N Decr   L. Extensor indicis proprius Incr None None None None N N N Decr   L. First dorsal interosseous Incr None None None None N N 1+ Decr   L.  Abductor pollicis brevis Incr None None None None N N 1+ Decr   L. Cervical paraspinals (low) Incr None None None None       L. Cervical paraspinals (mid) N None None None None           Study Limitations:  None     Summary of Findings:    1. Sensory nerve conduction studies of the left median nerve showed prolonged peak wrist latency and slowing across the wrist. All other nerves tested showed normal peak latencies, normal SNAP amplitudes, and normal conduction velocities. 2. Motor nerve conduction studies of the left median nerve showed prolonged distal wrist latency. The left ulnar nerve showed normal distal latencies, normal CMAP amplitudes, and normal conduction velocities. 3. F-wave studies of the left median and ulnar nerves revealed normal latencies. 4. EMG was performed with monopolar needle in multiple muscles outlined above, including the cervical paraspinals. There was increased insertional activity in the left triceps, extensor indicis proprius, first dorsal interosseous, abductor pollicis brevis and lower cervical paraspinals. There was decreased recruitment in the left triceps, pronator, extensor indicis proprius muscles. There was decreased recruitment and polyphasics in the left first dorsal interosseous and abductor pollicis brevis muscles. All other muscles tested revealed normal insertional activity, without evidence of abnormal spontaneous activity. All MUAP's were of normal amplitude and duration with a full recruitment pattern. No increase in polyphasic potentials was noted. Diagnostic Interpretation: This study was abnormal.     1. There is electrodiagnostic evidence for left C7 and C8 motor radiculopathy, axonal in nature without evidence of active denervation but there is membrane irritability. It is chronic in duration, mild in severity. Prognosis for axonal lesions is poor and dependent upon collateral sprouting. Recommend correlation with cervical MRI.      2. 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 It is chronic in duration. Prognosis for demyelinating lesions is good. Previous Study: 2016 Dr. Marii Harris- revealed left carpal tunnel syndrome. Unable to compare direct data as measurements are different. Follow up EMG is recommended if clinically indicated. Technologist: Akihl Scales    Physician: Tegan Fischer DO, [facility]   Board Certified Physical Medicine and Rehabilitation      Nerve conduction studies and electromyography were performed according to our laboratory policies and procedures which can be provided upon request. All abnormal values are identified in the table.  Laboratory normal values can also be provided upon request.       Cc: MD Jeremi Webster DO

## 2021-04-07 ENCOUNTER — HOSPITAL ENCOUNTER (OUTPATIENT)
Age: 41
Discharge: HOME | End: 2021-04-07
Payer: COMMERCIAL

## 2021-04-07 DIAGNOSIS — E10.9 TYPE 1 DIABETES MELLITUS WITHOUT COMPLICATION (HCC): ICD-10-CM

## 2021-04-07 DIAGNOSIS — E03.9 HYPOTHYROIDISM, UNSPECIFIED TYPE: ICD-10-CM

## 2021-04-07 DIAGNOSIS — E55.9 VITAMIN D DEFICIENCY: ICD-10-CM

## 2021-04-07 LAB
ALBUMIN SERPL-MCNC: 4.2 G/DL (ref 3.5–5.2)
ALP BLD-CCNC: 66 U/L (ref 35–104)
ALT SERPL-CCNC: 7 U/L (ref 0–32)
ANION GAP SERPL CALCULATED.3IONS-SCNC: 6 MMOL/L (ref 7–16)
AST SERPL-CCNC: 12 U/L (ref 0–31)
BILIRUB SERPL-MCNC: 0.3 MG/DL (ref 0–1.2)
BUN BLDV-MCNC: 8 MG/DL (ref 6–20)
CALCIUM SERPL-MCNC: 9 MG/DL (ref 8.6–10.2)
CHLORIDE BLD-SCNC: 104 MMOL/L (ref 98–107)
CHOLESTEROL, TOTAL: 171 MG/DL (ref 0–199)
CO2: 27 MMOL/L (ref 22–29)
CREAT SERPL-MCNC: 1.1 MG/DL (ref 0.5–1)
CREATININE URINE: 56 MG/DL (ref 29–226)
GFR AFRICAN AMERICAN: >60
GFR NON-AFRICAN AMERICAN: 55 ML/MIN/1.73
GLUCOSE BLD-MCNC: 173 MG/DL (ref 74–99)
HBA1C MFR BLD: 6.7 % (ref 4–5.6)
HDLC SERPL-MCNC: 51 MG/DL
LDL CHOLESTEROL CALCULATED: 96 MG/DL (ref 0–99)
MICROALBUMIN UR-MCNC: <12 MG/L
MICROALBUMIN/CREAT UR-RTO: ABNORMAL (ref 0–30)
POTASSIUM SERPL-SCNC: 3.8 MMOL/L (ref 3.5–5)
SODIUM BLD-SCNC: 137 MMOL/L (ref 132–146)
T4 FREE: 1.58 NG/DL (ref 0.93–1.7)
TOTAL PROTEIN: 7 G/DL (ref 6.4–8.3)
TRIGL SERPL-MCNC: 120 MG/DL (ref 0–149)
TSH SERPL DL<=0.05 MIU/L-ACNC: 0.52 UIU/ML (ref 0.27–4.2)
VITAMIN D 25-HYDROXY: 22 NG/ML (ref 30–100)
VLDLC SERPL CALC-MCNC: 24 MG/DL

## 2021-04-07 PROCEDURE — 36415 COLL VENOUS BLD VENIPUNCTURE: CPT

## 2021-04-07 PROCEDURE — 84439 ASSAY OF FREE THYROXINE: CPT

## 2021-04-07 PROCEDURE — 83036 HEMOGLOBIN GLYCOSYLATED A1C: CPT

## 2021-04-07 PROCEDURE — 80061 LIPID PANEL: CPT

## 2021-04-07 PROCEDURE — 82570 ASSAY OF URINE CREATININE: CPT

## 2021-04-07 PROCEDURE — 84443 ASSAY THYROID STIM HORMONE: CPT

## 2021-04-07 PROCEDURE — 80053 COMPREHEN METABOLIC PANEL: CPT

## 2021-04-07 PROCEDURE — 82044 UR ALBUMIN SEMIQUANTITATIVE: CPT

## 2021-04-07 PROCEDURE — 82306 VITAMIN D 25 HYDROXY: CPT

## 2021-04-08 ENCOUNTER — OFFICE VISIT (OUTPATIENT)
Dept: ENDOCRINOLOGY | Age: 41
End: 2021-04-08
Payer: MEDICAID

## 2021-04-08 VITALS
OXYGEN SATURATION: 98 % | WEIGHT: 171 LBS | SYSTOLIC BLOOD PRESSURE: 130 MMHG | BODY MASS INDEX: 29.35 KG/M2 | HEART RATE: 68 BPM | DIASTOLIC BLOOD PRESSURE: 78 MMHG

## 2021-04-08 DIAGNOSIS — E55.9 VITAMIN D DEFICIENCY: ICD-10-CM

## 2021-04-08 DIAGNOSIS — E10.9 TYPE 1 DIABETES MELLITUS WITHOUT COMPLICATION (HCC): Primary | ICD-10-CM

## 2021-04-08 DIAGNOSIS — E03.9 HYPOTHYROIDISM, UNSPECIFIED TYPE: ICD-10-CM

## 2021-04-08 DIAGNOSIS — Z96.41 INSULIN PUMP IN PLACE: ICD-10-CM

## 2021-04-08 PROCEDURE — 99214 OFFICE O/P EST MOD 30 MIN: CPT | Performed by: INTERNAL MEDICINE

## 2021-04-08 NOTE — PROGRESS NOTES
[address] [place] [address] Department of Endocrinology Diabetes and Metabolism   [address] [facility] [address]   Phone: [phone]  Fax: [phone]    Date of Service: 4/8/2021    Primary Care Physician: Adria Gramajo DO  Provider: Wimler Ashton MD     Reason for the visit:  DM type 1 on Omnipod insulin pump, hypothyroidism     History of Present Illness: The history is provided by the patient. No  was used. Accuracy of the patient data is excellent. Gamaliel Valentine is a very pleasant 38 y.o. female seen today for follow up visit     Gamaliel Valentine was diagnosed with diabetes at age 2  Omnipod pump DEXCOM cgm 6. Current pump settings: basal rate 12a 0.95, 7a 2, CR 15, ISF 50, goal 100-150, active insulin time 3 hrs    The patient has been checking blood sugar multiple times a day using DEXCOM and most readings at goal   Lab Results   Component Value Date    LABA1C 6.7 04/07/2021    LABA1C 7.0 11/30/2020    LABA1C 7.7 07/18/2020     Patient reported hypoglycemic episodes, usually in am   The patient has been mindful of what has been eating and following diabetic diet as encouraged  I reviewed current medications and the patient has no issues with diabetes medications  Gamaliel Valentine is up to date with eye exam and denied any history of diabetic retinopathy   The patient seeing performs her own feet care  Microvascular complications:  No Retinopathy, no Nephropathy + Neuropathy   Macrovascular complications: no CAD, PVD, or Stroke  The patient receives Flushot every year    Hypothyroidism    On levothyroxine 88 mcg daily. Patient takes levothyroxine in the morning at empty stomach, wait one hour before eating , avoid multivitamins containing calcium  or iron with it.   Lab Results   Component Value Date/Time    TSH 0.523 04/07/2021 07:47 AM    T4FREE 1.58 04/07/2021 07:47 AM         PAST MEDICAL HISTORY   Past Medical History:   Diagnosis Date    Anxiety  Depression     Diabetes mellitus type 1, controlled, without complications ([facility] [address] 1930    Epicondylitis, lateral 2017    right    GERD (gastroesophageal reflux disease)     Hypertension     IBS (irritable bowel syndrome)     Insulin pump status has insulin pump    follows with Dr. Lizz Navas SIRS (systemic inflammatory response syndrome) ([facility] [address] 2016    Thyroid disease     Trigger finger     right index and little finger       PAST SURGICAL HISTORY   Past Surgical History:   Procedure Laterality Date    CARPAL TUNNEL RELEASE  2011    left hand     DEBRIDEMENT Right 2017    Right lateral epicondylar debridement    ENDOSCOPY, COLON, DIAGNOSTIC      FINGER TRIGGER RELEASE      middle finger bilat, and right thumb  / multiple  / last one 2017    FINGER TRIGGER RELEASE Right 2016    4th finger    FINGER TRIGGER RELEASE Right 4/3/2019    TRIGGER RELEASE RIGHT INDEX AND SMALL FINGER performed by Shimon Tariq MD at [place] [address]  2009    exploratory      [facility]  x2    , Low Cervical    SKIN BIOPSY         SOCIAL HISTORY   Tobacco:   reports that she has never smoked. She has never used smokeless tobacco.  Alcohol:   reports current alcohol use. Drugs:   reports no history of drug use. FAMILY HISTORY   Family History   Problem Relation Age of Onset    Cancer Mother     High Blood Pressure Maternal Grandmother        ALLERGIES AND DRUG REACTIONS   Allergies   Allergen Reactions    Penicillins Hives    Sulfa Antibiotics Rash     fever       CURRENT MEDICATIONS   Current Outpatient Medications   Medication Sig Dispense Refill    pantoprazole (PROTONIX) 40 MG tablet Take 40 mg by mouth daily      insulin aspart (NOVOLOG) 100 UNIT/ML injection vial USE VIA INSULIN PUMP. MAX 60 UNITS PER DAY.  6 vial 3    blood glucose test strips (ONETOUCH ULTRA) strip CHECK BLOOD SUGAR FOUR TIMES DAILY 150 strip 11    Insulin Disposable Pump (OMNIPOD DASH 5 PACK PODS) MISC To continuously administer insulin. The pt changes q3days 30 each 3    levothyroxine (SYNTHROID) 88 MCG tablet TAKE 1 TABLET BY MOUTH DAILY 90 tablet 3    Insulin Disposable Pump (OMNIPOD DASH SYSTEM) KIT To continuously administer inuslin 1 kit 0    levonorgestrel (MIRENA) IUD 52 mg by Intrauterine route      ARIPiprazole (ABILIFY) 2 MG tablet Take 2 mg by mouth daily      Cyanocobalamin (VITAMIN B-12 PO) Take 2,500 mcg by mouth daily LD 3/28/2019      Cholecalciferol (VITAMIN D3) 2000 UNITS CAPS Take 2 capsules by mouth daily      gabapentin (NEURONTIN) 100 MG capsule Take 300 mg by mouth nightly. Takes nightly      losartan (COZAAR) 50 MG tablet Take 50 mg by mouth daily  0    DULoxetine (CYMBALTA) 30 MG capsule Take 60 mg by mouth daily Instructed to take am of procedure      rosuvastatin (CRESTOR) 5 MG tablet Take 5 mg by mouth daily      busPIRone (BUSPAR) 7.5 MG tablet Take 7.5 mg by mouth        No current facility-administered medications for this visit. Review of Systems  Constitutional: No fever, no chills, no diaphoresis, no generalized weakness. HEENT: No blurred vision, No sore throat, no ear pain, no hair loss  Neck: denied any neck swelling, difficulty swallowing,   Cardio-pulmonary: No CP, SOB or palpitation, No orthopnea or PND. No cough or wheezing. GI: No N/V/D, no constipation, No abdominal pain, no melena or hematochezia   : Denied any dysuria, hematuria, flank pain, discharge, or incontinence. Skin: denied any rash, ulcer, Hirsute, or hyperpigmentation. MSK: denied any joint deformity, joint pain/swelling, muscle pain, or back pain.   Neuro: no numbness, no tingling, no weakness, _    OBJECTIVE    /78   Pulse 68   Wt 171 lb (77.6 kg)   SpO2 98%   BMI 29.35 kg/m²   BP Readings from Last 4 Encounters:   04/08/21 130/78   11/30/20 118/72   10/19/20 132/82   10/16/19 119/79     Wt Readings from Last 6 Encounters:   04/08/21 171 lb (77.6 kg) 03/16/21 160 lb (72.6 kg)   03/02/21 140 lb (63.5 kg)   11/30/20 168 lb (76.2 kg)   10/19/20 161 lb (73 kg)   10/16/19 152 lb (68.9 kg)     Physical examination:  General: awake alert, oriented x3, no abnormal position or movements. HEENT: normocephalic non traumatic, no exophthalmos   Neck: supple, no LN enlargement, no thyromegaly, no thyroid tenderness, no JVD. Pulm: Clear equal air entry no added sounds, no wheezing or rhonchi    CVS: S1 + S2, no murmur, no heave. Dorsalis pedis pulse palpable   Abd: soft lax, no tenderness, no organomegaly, audible bowel sounds. Skin: warm, no lesions, no rash.  No callus, no Ulcers, No acanthosis nigricans   Neuro: CN intact, sensation present bilateral , muscle power normal  Psych: normal mood, and affect    Review of Laboratory Data:  I personally reviewed the following lab:  Lab Results   Component Value Date/Time    WBC 8.6 04/13/2018 01:15 AM    RBC 4.27 04/13/2018 01:15 AM    HGB 12.9 04/13/2018 01:15 AM    HCT 37.9 04/13/2018 01:15 AM    MCV 88.8 04/13/2018 01:15 AM    MCH 30.2 04/13/2018 01:15 AM    MCHC 34.0 04/13/2018 01:15 AM    RDW 12.7 04/13/2018 01:15 AM     04/13/2018 01:15 AM    MPV 9.8 04/13/2018 01:15 AM      Lab Results   Component Value Date/Time     04/07/2021 07:47 AM    K 3.8 04/07/2021 07:47 AM    CO2 27 04/07/2021 07:47 AM    BUN 8 04/07/2021 07:47 AM    CREATININE 1.1 (H) 04/07/2021 07:47 AM    CALCIUM 9.0 04/07/2021 07:47 AM    LABGLOM 55 04/07/2021 07:47 AM    GFRAA >60 04/07/2021 07:47 AM      Lab Results   Component Value Date/Time    TSH 0.523 04/07/2021 07:47 AM    T4FREE 1.58 04/07/2021 07:47 AM    FT3 2.8 12/03/2015 07:00 AM    T7QHHWC 103.80 12/03/2015 07:00 AM     Lab Results   Component Value Date    LABA1C 6.7 04/07/2021    GLUCOSE 173 04/07/2021    MALBCR - 04/07/2021    LABMICR <12.0 04/07/2021    LABCREA 56 04/07/2021     Lab Results   Component Value Date    LABA1C 6.7 04/07/2021    LABA1C 7.0 11/30/2020    LABA1C 7.7 07/18/2020     Lab Results   Component Value Date    TRIG 120 04/07/2021    HDL 51 04/07/2021    LDLCALC 96 04/07/2021    CHOL 171 04/07/2021     Lab Results   Component Value Date    VITD25 22 04/07/2021    VITD25 27 07/18/2020     ASSESSMENT & RECOMMENDATIONS   Anna Cabrera, a 38 y.o.-old female seen in for the following issues     Diabetes Mellitus Type 1     · Patient's diabetes is under good control , A1c 6.7%   · On Omnipod insulin pump with DEXCOM CGM  ·  pump settings: basal rate 12a 0.95, 7a 2, CR 15, ISF 50, goal 100-150, active insulin time 3 hrs    · To bring her pump for download in few days to adjust pump settings     · Discussed with patient A1c and blood sugar goals   · Patient up to date with the routine diabetes maintenance and prevention  · Diabetes labs before next visit     Hypoglycemia   · Continue using DEXCOM CGM  · With Type 1 DM and hypothyroidism, I checked AM cortisol and was WNL     Primary Hypothyroidism   · At goal, Continue Levothyroxine 88 mcg daily    I personally reviewed external notes from PCP and other patient's care team providers, and personally interpreted labs associated with the above diagnosis. I also ordered labs to further assess and manage the above addressed medical conditions    Return in about 4 months (around 8/8/2021) for DM type 1, VitD deficiency, hypothyroidism. The above issues were reviewed with the patient who understood and agreed with the plan. Thank you for allowing us to participate in the care of this patient. Please do not hesitate to contact us with any additional questions. Diagnosis Orders   1. Type 1 diabetes mellitus without complication (HCC)  vitamin D (CHOLECALCIFEROL) 46808 UNIT CAPS    Basic Metabolic Panel    Hemoglobin A1C   2. Hypothyroidism, unspecified type     3. Vitamin D deficiency     4.  Insulin pump in place       Claudeen Chaco MD  Endocrinologist, [facility]   [address] [facility], [address] [place] [address],[address] [id number]   Phone: [phone]  Fax: [phone]  ----------------------------  An electronic signature was used to authenticate this note.  Steffen Correa MD on 4/8/2021 at 1:32 PM

## 2021-04-13 ENCOUNTER — OFFICE VISIT (OUTPATIENT)
Dept: ORTHOPEDIC SURGERY | Age: 41
End: 2021-04-13
Payer: MEDICAID

## 2021-04-13 VITALS — BODY MASS INDEX: 27.31 KG/M2 | HEIGHT: 64 IN | RESPIRATION RATE: 18 BRPM | WEIGHT: 160 LBS

## 2021-04-13 DIAGNOSIS — M54.12 CERVICAL RADICULOPATHY: Primary | ICD-10-CM

## 2021-04-13 PROCEDURE — 99213 OFFICE O/P EST LOW 20 MIN: CPT | Performed by: ORTHOPAEDIC SURGERY

## 2021-04-16 ENCOUNTER — TELEPHONE (OUTPATIENT)
Dept: ORTHOPEDIC SURGERY | Age: 41
End: 2021-04-16

## 2021-04-16 NOTE — TELEPHONE ENCOUNTER
Spoke with representative from [facility] regarding MRI prior authorization. According to the representative, no prior authorization is needed for CPT 14258 (MRI Cervical Spine WO Contrast). Will call patient to set up appointment for MRI.

## 2021-04-25 ENCOUNTER — HOSPITAL ENCOUNTER (OUTPATIENT)
Dept: MRI IMAGING | Age: 41
Discharge: HOME | End: 2021-04-27
Payer: COMMERCIAL

## 2021-04-25 DIAGNOSIS — M54.12 CERVICAL RADICULOPATHY: ICD-10-CM

## 2021-04-25 PROCEDURE — 72141 MRI NECK SPINE W/O DYE: CPT

## 2021-05-13 ENCOUNTER — OFFICE VISIT (OUTPATIENT)
Dept: ORTHOPEDIC SURGERY | Age: 41
End: 2021-05-13
Payer: MEDICAID

## 2021-05-13 VITALS — BODY MASS INDEX: 27.31 KG/M2 | WEIGHT: 160 LBS | HEIGHT: 64 IN | RESPIRATION RATE: 18 BRPM

## 2021-05-13 DIAGNOSIS — G56.02 CARPAL TUNNEL SYNDROME OF LEFT WRIST: Primary | ICD-10-CM

## 2021-05-13 PROCEDURE — 99213 OFFICE O/P EST LOW 20 MIN: CPT | Performed by: ORTHOPAEDIC SURGERY

## 2021-05-13 NOTE — PROGRESS NOTES
Chief Complaint   Patient presents with    Follow-up     F/U MRI cervical results         Garry Ross is a 38y.o. year old  who presents for follow up of MRI of the cervical spine as well as x-rays. Did review the cervical spine x-rays as well as the MRI of the cervical spine. This demonstrates some mild spondylosis. There is some mild disc herniation without any significant encroachment on the C6-C7 or C8 nerve roots as was the concern on the EMG nerve conduction study previously performed. Again she has no significant pain in the neck. She does have numbness and tingling in that left hand which is bothersome to her. She did have a previous cortisone injection into the carpal tunnel about 2 months ago. She reports no significant improvement in her symptomatology with this injection.       Past Medical History:   Diagnosis Date    Anxiety     Depression     Diabetes mellitus type 1, controlled, without complications ([facility] [address]     Epicondylitis, lateral 2017    right    GERD (gastroesophageal reflux disease)     Hypertension     IBS (irritable bowel syndrome)     Insulin pump status has insulin pump    follows with Dr. Alexia De Leon SIRS (systemic inflammatory response syndrome) ([facility] [address] 2016    Thyroid disease     Trigger finger     right index and little finger     Past Surgical History:   Procedure Laterality Date    CARPAL TUNNEL RELEASE      left hand     DEBRIDEMENT Right 2017    Right lateral epicondylar debridement    ENDOSCOPY, COLON, DIAGNOSTIC      FINGER TRIGGER RELEASE      middle finger bilat, and right thumb  / multiple  / last one 2017    FINGER TRIGGER RELEASE Right 2016    4th finger    FINGER TRIGGER RELEASE Right 4/3/2019    TRIGGER RELEASE RIGHT INDEX AND SMALL FINGER performed by Jay Lee MD at [place] [address]      exploratory      [facility]  x2    , Low Cervical    SKIN BIOPSY         Current Outpatient Medications:     vitamin D (CHOLECALCIFEROL) 61954 UNIT CAPS, Take 1 capsule weekly for four weeks not refills, Disp: 4 capsule, Rfl: 0    pantoprazole (PROTONIX) 40 MG tablet, Take 40 mg by mouth daily, Disp: , Rfl:     insulin aspart (NOVOLOG) 100 UNIT/ML injection vial, USE VIA INSULIN PUMP. MAX 60 UNITS PER DAY., Disp: 6 vial, Rfl: 3    blood glucose test strips (ONETOUCH ULTRA) strip, CHECK BLOOD SUGAR FOUR TIMES DAILY, Disp: 150 strip, Rfl: 11    Insulin Disposable Pump (OMNIPOD DASH 5 PACK PODS) MISC, To continuously administer insulin. The pt changes q3days, Disp: 30 each, Rfl: 3    levothyroxine (SYNTHROID) 88 MCG tablet, TAKE 1 TABLET BY MOUTH DAILY, Disp: 90 tablet, Rfl: 3    Insulin Disposable Pump (OMNIPOD DASH SYSTEM) KIT, To continuously administer inuslin, Disp: 1 kit, Rfl: 0    rosuvastatin (CRESTOR) 5 MG tablet, Take 5 mg by mouth daily, Disp: , Rfl:     levonorgestrel (MIRENA) IUD 52 mg, by Intrauterine route, Disp: , Rfl:     ARIPiprazole (ABILIFY) 2 MG tablet, Take 2 mg by mouth daily, Disp: , Rfl:     Cyanocobalamin (VITAMIN B-12 PO), Take 2,500 mcg by mouth daily LD 3/28/2019, Disp: , Rfl:     busPIRone (BUSPAR) 7.5 MG tablet, Take 7.5 mg by mouth , Disp: , Rfl:     Cholecalciferol (VITAMIN D3) 2000 UNITS CAPS, Take 2 capsules by mouth daily, Disp: , Rfl:     gabapentin (NEURONTIN) 100 MG capsule, Take 300 mg by mouth nightly.  Takes nightly, Disp: , Rfl:     losartan (COZAAR) 50 MG tablet, Take 50 mg by mouth daily, Disp: , Rfl: 0    DULoxetine (CYMBALTA) 30 MG capsule, Take 60 mg by mouth daily Instructed to take am of procedure, Disp: , Rfl:   Allergies   Allergen Reactions    Penicillins Hives    Sulfa Antibiotics Rash     fever     Social History     Socioeconomic History    Marital status:      Spouse name: Not on file    Number of children: Not on file    Years of education: Not on file    Highest education level: Not on file   Occupational History  Not on file   Social Needs    Financial resource strain: Not on file    Food insecurity     Worry: Not on file     Inability: Not on file    Transportation needs     Medical: Not on file     Non-medical: Not on file   Tobacco Use    Smoking status: Never Smoker    Smokeless tobacco: Never Used   Substance and Sexual Activity    Alcohol use: Yes     Alcohol/week: 0.0 standard drinks     Comment: socially    Drug use: No    Sexual activity: Not on file   Lifestyle    Physical activity     Days per week: Not on file     Minutes per session: Not on file    Stress: Not on file   Relationships    Social connections     Talks on phone: Not on file     Gets together: Not on file     Attends Christian service: Not on file     Active member of club or organization: Not on file     Attends meetings of clubs or organizations: Not on file     Relationship status: Not on file    Intimate partner violence     Fear of current or ex partner: Not on file     Emotionally abused: Not on file     Physically abused: Not on file     Forced sexual activity: Not on file   Other Topics Concern    Not on file   Social History Narrative    Not on file     Family History   Problem Relation Age of Onset    Cancer Mother     High Blood Pressure Maternal Grandmother        Skin: (-) rash,(-) psoriasis,(-) eczema, (-)skin cancer. Musculoskeletal: left hand numbness and tingling  Neurologic: (-) epilepsy, (-)seizures,(-) brain tumor,(-) TIA, (-)stroke, (-)headaches, (-)Parkinson disease,(-) memory loss, (-) LOC. Cardiovascular: (-) Chest pain, (-) swelling in legs/feet, (-) SOB, (-) cramping in legs/feet with walking. SUBJECTIVE:      Constitutional:    The patient is alert and oriented x 3, appears to be stated age and in no distress. Left upper extremity: Nontender of the cervical spine. Negative letter meets the left and right. Negative Spurling's. Nontender about the shoulder and elbow region.   Hypersensitivity over the carpal tunnel with positive Tinel's. Positive Phalen's and modified Durkan's maneuver. Negative atrophy. APB and intrinsic strength 5/5. Negative Wartenberg's cross finger testing. Full digital flexion and extension of the thumb index middle ring and small fingers. Nontenderness over the A1 pulleys negative triggering. Radial, ulnar, median nerves are grossly intact with subjectively decreased sensation in the median nerve distribution. Brisk cap refill of digits. 2+ radial pulse. Positive Tinel's over the cubital tunnel.       Xrays: X-rays of cervical spine dated April 14, 2021 reviewed. Negative for acute fracture dislocations. Mild degenerative spondylosis. MRI of the cervical spine. MRI dated April 25, 2021. FINDINGS:   BONES/ALIGNMENT: A Schmorl's node is seen involving the superior endplate of   T2.  Otherwise, the vertebral body heights appear maintained.  Straightening   of the normal cervical lordosis.  No evidence of spondylolisthesis.  The bone   marrow signal demonstrates no acute abnormality.       SPINAL CORD: No abnormal cord signal is seen.       SOFT TISSUES: No paraspinal mass identified.       C2-C3: There is no significant disc bulge, spinal canal stenosis or neural   foraminal narrowing.       C3-C4: There is no significant disc bulge, spinal canal stenosis or neural   foraminal narrowing.       C4-C5: There is no significant disc bulge, spinal canal stenosis or neural   foraminal narrowing.       C5-C6: There is a disc bulge indenting on the ventral thecal sac contributing   to minimal spinal canal stenosis.  Uncovertebral joint and facet arthrosis   contributes to mild right neural foraminal narrowing.  No significant left   neural foraminal narrowing.       C6-C7: There is a disc bulge contributing to minimal spinal canal stenosis.    Uncovertebral joint and facet arthrosis contribute to mild-to-moderate   bilateral neural foraminal narrowing.       C7-T1: There is no significant disc bulge, spinal canal stenosis or neural   foraminal narrowing.           Impression   1. Minimal spinal canal stenosis at C5-C6 and C6-C7. 2. Neural foraminal narrowing at C5-C6 and C6-C7 as above. 3. Straightening of the normal cervical lordosis without evidence of   spondylolisthesis. Radiographic findings reviewed with patient      Impression:   Clinically present left carpal tunnel syndrome confirmed by EMG nerve conduction study  Diabetes, on insulin pump  Hypertension  Thyroid disease  GERD       Plan: Today's find were explained the patient. She had a work-up for possible radiculopathy with negative results on MRI and x-rays. She currently does not have any neck symptomatology. Treatment options including carpal tunnel release versus repeat cortisone injection were offered explained. After discussion she would like to have repeat injection. Is too soon to repeat this injection. She will follow-up in 1 month's time for possible injection for carpal tunnel syndrome.

## 2021-05-13 NOTE — PATIENT INSTRUCTIONS
Patient Education        Carpal Tunnel Syndrome: Care Instructions  Overview     Carpal tunnel syndrome is numbness, tingling, weakness, and pain in your hand, wrist, and sometimes forearm. It is caused by pressure on the median nerve. This nerve and several tough tissues called tendons run through a space in the wrist. This space is called the carpal tunnel. The repeated hand motions used in work and some hobbies and sports can put pressure on the median nerve. Pregnancy can cause carpal tunnel syndrome. Several conditions, such as diabetes, arthritis, and an underactive thyroid, can also cause it. You may be able to limit an activity or change the way you do it to reduce your symptoms. You also can take other steps to feel better. If your symptoms are mild, 1 to 2 weeks of home treatment are likely to ease your pain. Surgery is needed only if other treatments do not work. Follow-up care is a key part of your treatment and safety. Be sure to make and go to all appointments, and call your doctor if you are having problems. It's also a good idea to know your test results and keep a list of the medicines you take. How can you care for yourself at home? · If possible, stop or reduce the activity that causes your symptoms. If you cannot stop the activity, take frequent breaks to rest and stretch or change hand positions to do a task. Try switching hands, such as when using a computer mouse. · Try to avoid bending or twisting your wrists. · Ask your doctor if you can take an over-the-counter pain medicine, such as acetaminophen (Tylenol), ibuprofen (Advil, Motrin), or naproxen (Aleve). Be safe with medicines. Read and follow all instructions on the label. · If your doctor prescribes corticosteroid medicine to help reduce pain and swelling, take it exactly as prescribed. Call your doctor if you think you are having a problem with your medicine.   · Put ice or a cold pack on your wrist for 10 to 20 minutes at a time to ease pain. Put a thin cloth between the ice and your skin. · If your doctor or your physical or occupational therapist tells you to wear a wrist splint, wear it as directed to keep your wrist in a neutral position. This also eases pressure on your median nerve. · Ask your doctor whether you should have physical or occupational therapy to learn how to do tasks differently. · Try a yoga class to stretch your muscles and build strength in your hands and wrists. Yoga has been shown to ease carpal tunnel symptoms. To prevent carpal tunnel  · When working at a computer, keep your hands and wrists in line with your forearms. Hold your elbows close to your sides. Take a break every 10 to 15 minutes. · Try these exercises:  ? Warm up: Rotate your wrist up, down, and from side to side. Repeat this 4 times. Stretch your fingers far apart, relax them, then stretch them again. Repeat 4 times. Stretch your thumb by pulling it back gently, holding it, and then releasing it. Repeat 4 times. ? Prayer stretch: Start with your palms together in front of your chest just below your chin. Slowly lower your hands toward your waistline while keeping your hands close to your stomach and your palms together until you feel a mild to moderate stretch under your forearms. Hold for 10 to 20 seconds. Repeat 4 times. ? Wrist flexor stretch: Hold your arm in front of you with your palm up. Bend your wrist, pointing your hand toward the floor. With your other hand, gently bend your wrist further until you feel a mild to moderate stretch in your forearm. Hold for 10 to 20 seconds. Repeat 4 times. ? Wrist extensor stretch: Repeat the steps for the wrist flexor stretch, but begin with your extended hand palm down. · Squeeze a rubber exercise ball several times a day to keep your hands and fingers strong. · Avoid holding objects (such as a book) in one position for a long time. When possible, use your whole hand to grasp an object.  Using just the thumb and index finger can put stress on the wrist.  · Do not smoke. It can make this condition worse by reducing blood flow to the median nerve. If you need help quitting, talk to your doctor about stop-smoking programs and medicines. These can increase your chances of quitting for good. When should you call for help? Watch closely for changes in your health, and be sure to contact your doctor if:    · Your pain or other problems do not get better with home care.     · You want more information about physical or occupational therapy.     · You have side effects of your corticosteroid medicine, such as:  ? Weight gain. ? Mood changes. ? Trouble sleeping. ? Bruising easily.     · You have any other problems with your medicine. Where can you learn more? Go to https://[employer]. org and sign in to your [employer] account. Enter R432 in the medineering box to learn more about \"Carpal Tunnel Syndrome: Care Instructions. \"     If you do not have an account, please click on the \"Sign Up Now\" link. Current as of: November 16, 2020               Content Version: 12.8  © 2006-2021 [employer]. Care instructions adapted under license by [facility] ([facility]). If you have questions about a medical condition or this instruction, always ask your healthcare professional. [address] any warranty or liability for your use of this information. Patient Education        Carpal Tunnel Release: What to Expect at [facility] tunnel reduces the pressure on a nerve in the wrist. Your doctor cut a ligament that presses on the nerve. This lets the nerve pass freely through the tunnel without being squeezed. Your hand will hurt and may feel weak with some numbness. This usually goes away in a few days, but it may take several months. Your doctor may remove the large bandage, or he or she will tell you when and how to remove it yourself.  In some cases, you may have a splint. If you have one, you will wear it for about 2 weeks. Your doctor will take out your stitches in 1 to 2 weeks. Your hand and wrist may feel worse than they used to feel. But the pain should start to go away. It usually takes 3 to 4 months to recover and up to 1 year before hand strength returns. How much strength returns will vary. The timing of your return to work depends on the type of surgery you had, whether the surgery was on your dominant hand (the hand you use most), and your work activities. If you had open surgery on your dominant hand and you do repeated actions at work, you may be able to go back to work in 10 to 8 weeks. Repeated motions include typing or assembly-line work. If the surgery was on the other hand and you don't do repeated actions at work, you may be able to return to work in 9 to 14 days. If you had endoscopic surgery, you may be able to go back to work sooner than with open surgery. This care sheet gives you a general idea about how long it will take for you to recover. But each person recovers at a different pace. Follow the steps below to get better as quickly as possible. How can you care for yourself at home? Activity    · Rest when you feel tired. Getting enough sleep will help you recover.     · Try to walk each day. Start by walking a little more than you did the day before. Bit by bit, increase the amount you walk.     · For up to 2 weeks after surgery, avoid lifting things heavier than 1 to 2 pounds and using your hand. This includes doing repeated arm or hand movements, such as typing or using a computer mouse, washing windows, vacuuming, or chopping food. Do not use power tools, and avoid activities that cause vibration.     · You may do heavier tasks about 4 weeks after surgery. These include vacuuming, mowing the lawn, and gardening.     · You may shower 24 to 48 hours after surgery, if your doctor okays it.  Keep your bandage dry by taping a rehabilitation. This is a series of exercises you do after your surgery. This helps you get back your wrist's and hand's range of motion, strength, and . You will work with your doctor and physical or occupational therapist to plan this exercise program. To get the best results, you need to do the exercises correctly and as often and as long as your doctor tells you. Ice and elevation    · Put ice or a cold pack on your wrist for 10 to 20 minutes at a time. Try to do this every 1 to 2 hours for the next 3 days (when you are awake) or until the swelling goes down. Put a thin cloth between the ice and your skin.     · Prop up the sore wrist on a pillow when you ice it or anytime you sit or lie down during the next 3 days. Try to keep it above the level of your heart. This will help reduce swelling. Other instructions    · Avoid letting your hand hang down. This can cause swelling. Follow-up care is a key part of your treatment and safety. Be sure to make and go to all appointments, and call your doctor if you are having problems. It's also a good idea to know your test results and keep a list of the medicines you take. When should you call for help? Call 911 anytime you think you may need emergency care. For example, call if:    · You passed out (lost consciousness).     · You have chest pain, are short of breath, or cough up blood. Call your doctor now or seek immediate medical care if:    · You have pain that does not get better after you take pain medicine.     · Your hand is cool or pale or changes color.     · Your cast or splint feels too tight.     · You have tingling, weakness, or numbness in your hand or fingers.     · You are sick to your stomach or cannot drink fluids.     · You have loose stitches, or your incision comes open.     · You have signs of a blood clot in your leg (called a deep vein thrombosis), such as:  ? Pain in your calf, back of the knee, thigh, or groin. ?  Redness or

## 2021-09-30 DIAGNOSIS — E03.9 HYPOTHYROIDISM, UNSPECIFIED TYPE: ICD-10-CM

## 2021-09-30 RX ORDER — LEVOTHYROXINE SODIUM 88 UG/1
88 TABLET ORAL DAILY
Qty: 90 TABLET | Refills: 3 | Status: SHIPPED
Start: 2021-09-30 | End: 2022-03-24 | Stop reason: SDUPTHER

## 2022-01-25 DIAGNOSIS — E10.9 TYPE 1 DIABETES MELLITUS WITHOUT COMPLICATION (HCC): ICD-10-CM

## 2022-01-25 RX ORDER — BLOOD SUGAR DIAGNOSTIC
STRIP MISCELLANEOUS
Qty: 150 STRIP | Refills: 5 | Status: SHIPPED
Start: 2022-01-25 | End: 2022-09-19

## 2022-03-24 ENCOUNTER — OFFICE VISIT (OUTPATIENT)
Dept: ENDOCRINOLOGY | Age: 42
End: 2022-03-24
Payer: COMMERCIAL

## 2022-03-24 VITALS
WEIGHT: 170 LBS | BODY MASS INDEX: 29.02 KG/M2 | SYSTOLIC BLOOD PRESSURE: 132 MMHG | OXYGEN SATURATION: 99 % | HEART RATE: 97 BPM | HEIGHT: 64 IN | DIASTOLIC BLOOD PRESSURE: 84 MMHG

## 2022-03-24 DIAGNOSIS — Z96.41 INSULIN PUMP IN PLACE: ICD-10-CM

## 2022-03-24 DIAGNOSIS — E03.9 HYPOTHYROIDISM, UNSPECIFIED TYPE: ICD-10-CM

## 2022-03-24 DIAGNOSIS — E10.9 TYPE 1 DIABETES MELLITUS WITHOUT COMPLICATION (HCC): Primary | ICD-10-CM

## 2022-03-24 DIAGNOSIS — E55.9 VITAMIN D DEFICIENCY: ICD-10-CM

## 2022-03-24 DIAGNOSIS — E10.9 TYPE 1 DIABETES MELLITUS WITHOUT COMPLICATION (HCC): ICD-10-CM

## 2022-03-24 LAB
ALBUMIN SERPL-MCNC: 4.5 G/DL (ref 3.5–5.2)
ALP BLD-CCNC: 52 U/L (ref 35–104)
ALT SERPL-CCNC: 8 U/L (ref 0–32)
ANION GAP SERPL CALCULATED.3IONS-SCNC: 12 MMOL/L (ref 7–16)
AST SERPL-CCNC: 17 U/L (ref 0–31)
BILIRUB SERPL-MCNC: 0.6 MG/DL (ref 0–1.2)
BUN BLDV-MCNC: 8 MG/DL (ref 6–20)
CALCIUM SERPL-MCNC: 9.2 MG/DL (ref 8.6–10.2)
CHLORIDE BLD-SCNC: 100 MMOL/L (ref 98–107)
CO2: 24 MMOL/L (ref 22–29)
CREAT SERPL-MCNC: 0.9 MG/DL (ref 0.5–1)
CREATININE URINE: 74 MG/DL (ref 29–226)
GFR AFRICAN AMERICAN: >60
GFR NON-AFRICAN AMERICAN: >60 ML/MIN/1.73
GLUCOSE BLD-MCNC: 187 MG/DL (ref 74–99)
HBA1C MFR BLD: 7.1 %
MICROALBUMIN UR-MCNC: <12 MG/L
MICROALBUMIN/CREAT UR-RTO: ABNORMAL (ref 0–30)
POTASSIUM SERPL-SCNC: 4.3 MMOL/L (ref 3.5–5)
SODIUM BLD-SCNC: 136 MMOL/L (ref 132–146)
T4 FREE: 1.34 NG/DL (ref 0.93–1.7)
TOTAL PROTEIN: 7.2 G/DL (ref 6.4–8.3)
TSH SERPL DL<=0.05 MIU/L-ACNC: 2.33 UIU/ML (ref 0.27–4.2)
VITAMIN D 25-HYDROXY: 24 NG/ML (ref 30–100)

## 2022-03-24 PROCEDURE — 83036 HEMOGLOBIN GLYCOSYLATED A1C: CPT | Performed by: INTERNAL MEDICINE

## 2022-03-24 PROCEDURE — 3051F HG A1C>EQUAL 7.0%<8.0%: CPT | Performed by: INTERNAL MEDICINE

## 2022-03-24 PROCEDURE — 99214 OFFICE O/P EST MOD 30 MIN: CPT | Performed by: INTERNAL MEDICINE

## 2022-03-24 RX ORDER — LEVOTHYROXINE SODIUM 88 UG/1
88 TABLET ORAL DAILY
Qty: 90 TABLET | Refills: 3 | Status: ON HOLD
Start: 2022-03-24 | End: 2022-08-15 | Stop reason: HOSPADM

## 2022-03-24 NOTE — PROGRESS NOTES
Medical History:   Diagnosis Date    Anxiety     Depression     Diabetes mellitus type 1, controlled, without complications ([facility] [address] 5712    Epicondylitis, lateral 2017    right    GERD (gastroesophageal reflux disease)     Hypertension     IBS (irritable bowel syndrome)     Insulin pump status has insulin pump    follows with Dr. Kimberli Walker SIRS (systemic inflammatory response syndrome) ([facility] [address] 2016    Thyroid disease     Trigger finger     right index and little finger       PAST SURGICAL HISTORY   Past Surgical History:   Procedure Laterality Date    CARPAL TUNNEL RELEASE  2011    left hand     DEBRIDEMENT Right 2017    Right lateral epicondylar debridement    ENDOSCOPY, COLON, DIAGNOSTIC      FINGER TRIGGER RELEASE      middle finger bilat, and right thumb  / multiple  / last one 2017   [place] Right 2016    4th finger    FINGER TRIGGER RELEASE Right 4/3/2019    TRIGGER RELEASE RIGHT INDEX AND SMALL FINGER performed by Vipul Ortiz MD at [place] [address]    exploratory      [facility]  x2    , Low Cervical    SKIN BIOPSY         SOCIAL HISTORY   Tobacco:   reports that she has never smoked. She has never used smokeless tobacco.  Alcohol:   reports current alcohol use. Drugs:   reports no history of drug use. FAMILY HISTORY   Family History   Problem Relation Age of Onset    Cancer Mother     High Blood Pressure Maternal Grandmother        ALLERGIES AND DRUG REACTIONS   Allergies   Allergen Reactions    Penicillins Hives    Sulfa Antibiotics Rash     fever       CURRENT MEDICATIONS   Current Outpatient Medications   Medication Sig Dispense Refill    insulin aspart (NOVOLOG) 100 UNIT/ML injection vial USE VIA INSULIN PUMP.  UNITS PER DAY.  30 mL 11    levothyroxine (SYNTHROID) 88 MCG tablet Take 1 tablet by mouth Daily 90 tablet 3    ONETOUCH ULTRA strip CHECK BLOOD SUGAR FOUR TIMES DAILY 150 strip 5    Levonorgestrel (LILETTA, 52 MG,) IUD 52 mg 1 each by IntraUTERine route once Inserted 1/19/2022      Insulin Disposable Pump (OMNIPOD DASH 5 PACK PODS) MISC USE TO CONTINUOUSLY        ADMINISTER INSULIN AND     CHANGE EVERY 3 DAYS 30 each 3    esomeprazole Magnesium (NEXIUM) 20 MG PACK Take 20 mg by mouth daily      lidocaine viscous hcl (XYLOCAINE) 2 % SOLN solution Use small amount vaginally as needed for pain. 100 mL 0    Insulin Disposable Pump (OMNIPOD DASH SYSTEM) KIT To continuously administer inuslin 1 kit 0    ARIPiprazole (ABILIFY) 2 MG tablet Take 2 mg by mouth daily      Cholecalciferol (VITAMIN D3) 2000 UNITS CAPS Take 2 capsules by mouth daily      gabapentin (NEURONTIN) 100 MG capsule Take 300 mg by mouth nightly. Takes nightly      losartan (COZAAR) 50 MG tablet Take 50 mg by mouth daily  0    DULoxetine (CYMBALTA) 30 MG capsule Take 60 mg by mouth daily Instructed to take am of procedure       No current facility-administered medications for this visit. Review of Systems  Constitutional: No fever, no chills, no diaphoresis, no generalized weakness. HEENT: No blurred vision, No sore throat, no ear pain, no hair loss  Neck: denied any neck swelling, difficulty swallowing,   Cardio-pulmonary: No CP, SOB or palpitation, No orthopnea or PND. No cough or wheezing. GI: No N/V/D, no constipation, No abdominal pain, no melena or hematochezia   : Denied any dysuria, hematuria, flank pain, discharge, or incontinence. Skin: denied any rash, ulcer, Hirsute, or hyperpigmentation. MSK: denied any joint deformity, joint pain/swelling, muscle pain, or back pain.   Neuro: no numbness, no tingling, no weakness, _    OBJECTIVE    /84   Pulse 97   Ht 5' 4\" (1.626 m)   Wt 170 lb (77.1 kg)   SpO2 99%   BMI 29.18 kg/m²   BP Readings from Last 4 Encounters:   03/24/22 132/84   01/19/22 138/84   12/09/21 138/83   04/08/21 130/78     Wt Readings from Last 6 Encounters:   03/24/22 170 lb (77.1 kg)   01/19/22 165 lb (74.8 kg)   12/09/21 163 lb (73.9 kg)   05/13/21 160 lb (72.6 kg)   04/13/21 160 lb (72.6 kg)   04/08/21 171 lb (77.6 kg)     Physical examination:  General: awake alert, oriented x3, no abnormal position or movements. HEENT: normocephalic non traumatic, no exophthalmos   Neck: supple, no LN enlargement, no thyromegaly, no thyroid tenderness, no JVD. Pulm: Clear equal air entry no added sounds, no wheezing or rhonchi    CVS: S1 + S2, no murmur, no heave. Dorsalis pedis pulse palpable   Abd: soft lax, no tenderness, no organomegaly, audible bowel sounds. Skin: warm, no lesions, no rash.  No callus, no Ulcers, No acanthosis nigricans   Neuro: CN intact, sensation present bilateral , muscle power normal  Psych: normal mood, and affect    Review of Laboratory Data:  I personally reviewed the following lab:  Lab Results   Component Value Date/Time    WBC 8.6 04/13/2018 01:15 AM    RBC 4.27 04/13/2018 01:15 AM    HGB 12.9 04/13/2018 01:15 AM    HCT 37.9 04/13/2018 01:15 AM    MCV 88.8 04/13/2018 01:15 AM    MCH 30.2 04/13/2018 01:15 AM    MCHC 34.0 04/13/2018 01:15 AM    RDW 12.7 04/13/2018 01:15 AM     04/13/2018 01:15 AM    MPV 9.8 04/13/2018 01:15 AM      Lab Results   Component Value Date/Time     04/07/2021 07:47 AM    K 3.8 04/07/2021 07:47 AM    CO2 27 04/07/2021 07:47 AM    BUN 8 04/07/2021 07:47 AM    CREATININE 1.1 (H) 04/07/2021 07:47 AM    CALCIUM 9.0 04/07/2021 07:47 AM    LABGLOM 55 04/07/2021 07:47 AM    GFRAA >60 04/07/2021 07:47 AM      Lab Results   Component Value Date/Time    TSH 0.523 04/07/2021 07:47 AM    T4FREE 1.58 04/07/2021 07:47 AM    FT3 2.8 12/03/2015 07:00 AM    E6BSNFG 103.80 12/03/2015 07:00 AM     Lab Results   Component Value Date    LABA1C 7.1 03/24/2022    GLUCOSE 173 04/07/2021    MALBCR - 04/07/2021    LABMICR <12.0 04/07/2021    LABCREA 56 04/07/2021     Lab Results   Component Value Date    LABA1C 7.1 03/24/2022    LABA1C 6.7 04/07/2021 LABA1C 7.0 11/30/2020     Lab Results   Component Value Date    TRIG 120 04/07/2021    HDL 51 04/07/2021    LDLCALC 96 04/07/2021    CHOL 171 04/07/2021     Lab Results   Component Value Date    VITD25 22 04/07/2021    VITD25 27 07/18/2020     ASSESSMENT & RECOMMENDATIONS   Shruthi Golden, a 42 y.o.-old female seen in for the following issues     Diabetes Mellitus Type 1     · Patient's diabetes is under good control , A1c 7.1%   · On Omnipod insulin pump with DEXCOM CGM  · Adjust pump settings to basal rate 12a 0.8, 7a 1.55, 10p 0.85, CR 15, ISF 12a 65, 12p 60, 11p 65, goal 100-150, active insulin time 4hrs    · Discussed with patient A1c and blood sugar goals   · Patient up to date with the routine diabetes maintenance and prevention  · Diabetes labs before next visit     Continuous Glucose Monitoring (CGM) download and interpretation    I personally reviewed and interpreted continuous glucose monitor (CGM) download. CGM report was discussed with patient including blood glucose patterns, percentages of blood glucose at goal, above goal and below goal. Insulin dosages/antidiabetic regimen was adjusted according to CGM download. Full CGM was scanned under media. Hypoglycemia   · Continue using DEXCOM CGM  · With Type 1 DM and hypothyroidism, I checked AM cortisol and was WNL     Primary Hypothyroidism   · On Levothyroxine 88 mcg daily  · Check level today     I personally reviewed external notes from PCP and other patient's care team providers, and personally interpreted labs associated with the above diagnosis. I also ordered labs to further assess and manage the above addressed medical conditions    Return in about 4 months (around 7/24/2022) for DM type 1, hypothyroidism, VitD deficiency. The above issues were reviewed with the patient who understood and agreed with the plan. Thank you for allowing us to participate in the care of this patient.  Please do not hesitate to contact us with any additional questions. Diagnosis Orders   1. Type 1 diabetes mellitus without complication (HCC)  POCT glycosylated hemoglobin (Hb A1C)    insulin aspart (NOVOLOG) 100 UNIT/ML injection vial    Comprehensive Metabolic Panel    Microalbumin / Creatinine Urine Ratio   2. Hypothyroidism, unspecified type  levothyroxine (SYNTHROID) 88 MCG tablet    TSH    T4, Free   3. Vitamin D deficiency  Vitamin D 25 Hydroxy    Comprehensive Metabolic Panel   4. Insulin pump in place       Jamila Colvin MD  Endocrinologist, [facility]   [address] [place] [address]   Phone: [phone]  Fax: [phone]  ----------------------------  An electronic signature was used to authenticate this note.  Viviana Mathew MD on 3/24/2022 at 9:25 AM

## 2022-03-27 ENCOUNTER — TELEPHONE (OUTPATIENT)
Dept: ENDOCRINOLOGY | Age: 42
End: 2022-03-27

## 2022-05-24 ENCOUNTER — OFFICE VISIT (OUTPATIENT)
Dept: ORTHOPEDIC SURGERY | Age: 42
End: 2022-05-24
Payer: COMMERCIAL

## 2022-05-24 ENCOUNTER — TELEPHONE (OUTPATIENT)
Dept: ORTHOPEDIC SURGERY | Age: 42
End: 2022-05-24

## 2022-05-24 VITALS — HEIGHT: 64 IN | WEIGHT: 160 LBS | BODY MASS INDEX: 27.31 KG/M2

## 2022-05-24 DIAGNOSIS — M65.352 TRIGGER FINGER, LEFT LITTLE FINGER: ICD-10-CM

## 2022-05-24 DIAGNOSIS — M54.12 CERVICAL RADICULOPATHY: ICD-10-CM

## 2022-05-24 DIAGNOSIS — M25.521 RIGHT ELBOW PAIN: ICD-10-CM

## 2022-05-24 DIAGNOSIS — M65.352 TRIGGER LITTLE FINGER OF LEFT HAND: Primary | ICD-10-CM

## 2022-05-24 DIAGNOSIS — G56.02 CARPAL TUNNEL SYNDROME OF LEFT WRIST: ICD-10-CM

## 2022-05-24 DIAGNOSIS — M25.522 LEFT ELBOW PAIN: Primary | ICD-10-CM

## 2022-05-24 PROCEDURE — 20526 THER INJECTION CARP TUNNEL: CPT | Performed by: ORTHOPAEDIC SURGERY

## 2022-05-24 PROCEDURE — 20550 NJX 1 TENDON SHEATH/LIGAMENT: CPT | Performed by: ORTHOPAEDIC SURGERY

## 2022-05-24 PROCEDURE — 99213 OFFICE O/P EST LOW 20 MIN: CPT | Performed by: ORTHOPAEDIC SURGERY

## 2022-05-24 RX ORDER — BETAMETHASONE SODIUM PHOSPHATE AND BETAMETHASONE ACETATE 3; 3 MG/ML; MG/ML
12 INJECTION, SUSPENSION INTRA-ARTICULAR; INTRALESIONAL; INTRAMUSCULAR; SOFT TISSUE ONCE
Status: COMPLETED | OUTPATIENT
Start: 2022-05-24 | End: 2022-05-24

## 2022-05-24 RX ADMIN — BETAMETHASONE SODIUM PHOSPHATE AND BETAMETHASONE ACETATE 12 MG: 3; 3 INJECTION, SUSPENSION INTRA-ARTICULAR; INTRALESIONAL; INTRAMUSCULAR; SOFT TISSUE at 13:45

## 2022-05-24 RX ADMIN — BETAMETHASONE SODIUM PHOSPHATE AND BETAMETHASONE ACETATE 12 MG: 3; 3 INJECTION, SUSPENSION INTRA-ARTICULAR; INTRALESIONAL; INTRAMUSCULAR; SOFT TISSUE at 14:00

## 2022-05-24 NOTE — PROGRESS NOTES
Chief Complaint   Patient presents with    Elbow Pain     Bilateral elbow pain    Trigger finger     LT small finger trigger finger requesting injection          Mando Gallegos is a 42y.o. year old  who presents for follow up of tingling of left hand. She reports over the past couple months has had worsening numbness and tingling. Positive nocturnal symptoms pain with use. She also has recent onset of triggering of the little finger. She did have an EMG nerve conduction study completed March 16, 2021 which demonstrated left median motor latency of 4.79 and sensory 4.7. EMG portion of the test at that time also demonstrated probable C7 and C8 motor radiculopathy. She did have an MRI performed last year on April 23, 2021 which was essentially within normal limits. She reports at that time she had no neck pain but over the past couple months she has noticed increased symptoms of neck pain. She did see a chiropractor but relates that this was not helpful. She reports the numbness tingling the left hand is quite bothersome to her. She did have a favorable response over a year ago with a cortisone injection. She is diabetic. However she does have an insulin pump. She relates she can adjust the pump to cover for increased blood sugars with cortisone injections.       Past Medical History:   Diagnosis Date    Anxiety     Depression     Diabetes mellitus type 1, controlled, without complications ([address] 0/0/3572    Epicondylitis, lateral 02/2017    right    GERD (gastroesophageal reflux disease)     Hypertension     IBS (irritable bowel syndrome)     Insulin pump status has insulin pump    follows with Dr. Luis Villela SIRS (systemic inflammatory response syndrome) ([facility] [address] 1/8/2016    Thyroid disease     Trigger finger     right index and little finger     Past Surgical History:   Procedure Laterality Date    CARPAL TUNNEL RELEASE  2011    left hand     DEBRIDEMENT Right 02/14/2017    Right lateral epicondylar debridement    ENDOSCOPY, COLON, DIAGNOSTIC      FINGER TRIGGER RELEASE      middle finger bilat, and right thumb  / multiple  / last one 2017    FINGER TRIGGER RELEASE Right 2016    4th finger    FINGER TRIGGER RELEASE Right 4/3/2019    TRIGGER RELEASE RIGHT INDEX AND SMALL FINGER performed by Genesis Navas MD at [facility]  2009    exploratory     AL  DELIVERY ONLY  x2    , Low Cervical    SKIN BIOPSY         Current Outpatient Medications:     hydroxychloroquine (PLAQUENIL) 200 MG tablet, Take 200 mg by mouth 2 times daily, Disp: , Rfl:     predniSONE (DELTASONE) 5 MG tablet, Take 5 mg by mouth daily, Disp: , Rfl:     insulin aspart (NOVOLOG) 100 UNIT/ML injection vial, USE VIA INSULIN PUMP.  UNITS PER DAY., Disp: 30 mL, Rfl: 11    levothyroxine (SYNTHROID) 88 MCG tablet, Take 1 tablet by mouth Daily, Disp: 90 tablet, Rfl: 3    ONETOUCH ULTRA strip, CHECK BLOOD SUGAR FOUR TIMES DAILY, Disp: 150 strip, Rfl: 5    Levonorgestrel (LILETTA, 52 MG,) IUD 52 mg, 1 each by IntraUTERine route once Inserted 2022, Disp: , Rfl:     Insulin Disposable Pump (OMNIPOD DASH 5 PACK PODS) MISC, USE TO CONTINUOUSLY        ADMINISTER INSULIN AND     CHANGE EVERY 3 DAYS, Disp: 30 each, Rfl: 3    esomeprazole Magnesium (NEXIUM) 20 MG PACK, Take 20 mg by mouth daily, Disp: , Rfl:     lidocaine viscous hcl (XYLOCAINE) 2 % SOLN solution, Use small amount vaginally as needed for pain., Disp: 100 mL, Rfl: 0    Insulin Disposable Pump (OMNIPOD DASH SYSTEM) KIT, To continuously administer inuslin, Disp: 1 kit, Rfl: 0    ARIPiprazole (ABILIFY) 2 MG tablet, Take 2 mg by mouth daily, Disp: , Rfl:     Cholecalciferol (VITAMIN D3) 2000 UNITS CAPS, Take 2 capsules by mouth daily, Disp: , Rfl:     gabapentin (NEURONTIN) 100 MG capsule, Take 300 mg by mouth nightly.  Takes nightly, Disp: , Rfl:     losartan (COZAAR) 50 MG tablet, Take 50 mg by mouth daily, Disp: , Rfl: 0    DULoxetine (CYMBALTA) 30 MG capsule, Take 60 mg by mouth daily Instructed to take am of procedure, Disp: , Rfl:   Allergies   Allergen Reactions    Penicillins Hives    Sulfa Antibiotics Rash     fever     Social History     Socioeconomic History    Marital status:      Spouse name: Not on file    Number of children: Not on file    Years of education: Not on file    Highest education level: Not on file   Occupational History    Not on file   Tobacco Use    Smoking status: Never Smoker    Smokeless tobacco: Never Used   Vaping Use    Vaping Use: Never used   Substance and Sexual Activity    Alcohol use: Yes     Alcohol/week: 0.0 standard drinks     Comment: socially    Drug use: No    Sexual activity: Not on file   Other Topics Concern    Not on file   Social History Narrative    Not on file     Social Determinants of Health     Financial Resource Strain:     Difficulty of Paying Living Expenses: Not on file   Food Insecurity:     Worried About Running Out of Food in the Last Year: Not on file    Hill of Food in the Last Year: Not on file   Transportation Needs:     Lack of Transportation (Medical): Not on file    Lack of Transportation (Non-Medical):  Not on file   Physical Activity:     Days of Exercise per Week: Not on file    Minutes of Exercise per Session: Not on file   Stress:     Feeling of Stress : Not on file   Social Connections:     Frequency of Communication with Friends and Family: Not on file    Frequency of Social Gatherings with Friends and Family: Not on file    Attends [employer] Services: Not on file    Active Member of Clubs or Organizations: Not on file    Attends Club or Organization Meetings: Not on file    Marital Status: Not on file   Intimate Partner Violence:     Fear of Current or Ex-Partner: Not on file    Emotionally Abused: Not on file    Physically Abused: Not on file    Sexually Abused: Not on file   Housing Stability:     Unable to Pay for Housing in the Last Year: Not on file    Number of Places Lived in the Last Year: Not on file    Unstable Housing in the Last Year: Not on file     Family History   Problem Relation Age of Onset    Cancer Mother     High Blood Pressure Maternal Grandmother        Skin: (-) rash,(-) psoriasis,(-) eczema, (-)skin cancer. Musculoskeletal: Neck pain and left upper extremity numbness and tingling  Neurologic: (-) epilepsy, (-)seizures,(-) brain tumor,(-) TIA, (-)stroke, (-)headaches, (-)Parkinson disease,(-) memory loss, (-) LOC. Cardiovascular: (-) Chest pain, (-) swelling in legs/feet, (-) SOB, (-) cramping in legs/feet with walking. SUBJECTIVE:      Constitutional:    The patient is alert and oriented x 3, appears to be stated age and in no distress. Left upper extremity:     Nontender about the shoulder and elbow region.  Hypersensitivity over the carpal tunnel with positive Tinel's.  Positive Phalen's and modified Durkan's maneuver.  Negative atrophy.  APB and intrinsic strength 5/5.  Negative Wartenberg's cross finger testing.  Full digital flexion and extension of the thumb index middle ring and small fingers.  POSITIVE tendererness over the A1 pulley with triggering to little finger.  Radial, ulnar, median nerves are grossly intact with subjectively decreased sensation in the median nerve distribution.  Brisk cap refill of digits.  2+ radial pulse.  Positive Tinel's over the cubital tunnel.       Xrays: She is a left hand AP lateral obliques are negative for any acute fracture dislocations. Soft tissue within normal limits. Mild underlying thumb basal joint arthritis. Impression office x-rays left hand: Negative for acute fracture dislocations. X-rays of the bilateral elbows AP and lateral views were obtained today in the office. This demonstrates normal ulnohumeral and radiocapitellar alignment. Joint spaces are well-preserved. Negative for acute fracture dislocations.   Soft tissue within normal limits. Impression office x-rays bilateral elbows negative for acute fracture dislocations  Radiographic findings reviewed with patient      Impression:   Encounter Diagnoses   Name Primary?  Left elbow pain Yes    Cervical radiculopathy     Carpal tunnel syndrome of left wrist     Trigger finger, left little finger    Anxiety and depression  GERD  Hypertension  Insulin-dependent diabetes  Thyroid disease    Plan: Today's findings were explained the patient. For her new onset neck symptoms with a history of radiculopathy by EMG nerve conduction studies with negative MRI she was referred for further evaluation and management with PMNR. Regards to her carpal tunnel syndrome she did have good response with a cortisone injection in the past.  She understands with diabetes this may not be a long-term solution may require surgery in the future. In addition she has had a trigger injection which was well-tolerated. May follow-up at her discretion      Procedure Note Carpal Tunnel Injection    The left carpal tunnel was identified as the injection site. The risk and benefits of a cortisone injection were explained and the patient consented to the injection. Under sterile conditions, the carpal canal was injected with a mixture of 1 mL of 1% Lidocaine and 1 mL of 6 mg/mL Betamethasone without complication. A sterile bandage was applied. Procedure Note Trigger Finger Injection    The left Little finger A1 pulley was identified as the injection site. The risk and benefits of a cortisone injection were explained and the patient consented to the injection. Under sterile conditions, the digit was injected with a mixture of 1 mL of 1% Lidocaine and 1 mL of 6 mg/mL Betamethasone without complication. A sterile bandage was applied.

## 2022-05-24 NOTE — PATIENT INSTRUCTIONS
Patient Education        Trigger Finger: Care Instructions  Overview  A trigger finger is a finger stuck in a bent position. It happens when the tendon that bends and straightens the thumb or finger can't slide smoothly under the ligaments that hold the tendon against the bones. In most cases, it's caused by a bump (nodule) that forms on the tendon. The bent finger usuallystraightens out on its own. A trigger finger can be painful. But it normally isn't a serious problem. Trigger fingers seem to occur more in some groups of people. These groupsinclude:   People who have diabetes or arthritis.  People who have injured their hands in the past.   Musicians.  People who  tools often. Rest and exercises may help your finger relax so that it can bend. You may get a corticosteroid shot. This can reduce swelling and pain. Your doctor may put a splint on your finger. It will give your finger some rest. Agnieszka Fail need surgery if the finger keeps locking in a bent position. Follow-up care is a key part of your treatment and safety. Be sure to make and go to all appointments, and call your doctor if you are having problems. It's also a good idea to know your test results and keep alist of the medicines you take. How can you care for yourself at home?  If your doctor put a splint on your finger, wear it as directed. Don't take it off until your doctor says you can.  You may need to change your activities to avoid movements that irritate the finger.  Take your medicines exactly as prescribed. Call your doctor if you think you are having a problem with your medicine.  Ask your doctor if you can take an over-the-counter pain medicine, such as acetaminophen (Tylenol), ibuprofen (Advil, Motrin), or naproxen (Aleve). Be safe with medicines. Read and follow all instructions on the label.  If your doctor recommends exercises, do them as directed. When should you call for help?    Call your doctor now or seek immediate medical care if:     Your finger locks in a bent position and will not straighten. Watch closely for changes in your health, and be sure to contact your doctor if:     You do not get better as expected. Where can you learn more? Go to https://chpepiceweb.1Cast. org and sign in to your [employer] account. Enter M826 in the Autogrid box to learn more about \"Trigger Finger: Care Instructions. \"     If you do not have an account, please click on the \"Sign Up Now\" link. Current as of: July 1, 2021               Content Version: 13.2  © 2006-2022 [employer]. Care instructions adapted under license by [facility] [place] ([facility]). If you have questions about a medical condition or this instruction, always ask your healthcare professional. [address] any warranty or liability for your use of this information. Patient Education        Carpal Tunnel Syndrome: Care Instructions  Overview     Carpal tunnel syndrome is numbness, tingling, weakness, and pain in your hand, wrist, and sometimes forearm. It is caused by pressure on the median nerve. This nerve and several tough tissues called tendons run through a space in thewrist. This space is called the carpal tunnel. The repeated hand motions used in work and some hobbies and sports can put pressure on the median nerve. Pregnancy can cause carpal tunnel syndrome. Several conditions, such as diabetes, arthritis, and an underactive thyroid,can also cause it. You may be able to limit an activity or change the way you do it to reduce your symptoms. You also can take other steps to feel better. If your symptoms are mild, 1 to 2 weeks of home treatment are likely to ease your pain. Surgery isneeded only if other treatments do not work. Follow-up care is a key part of your treatment and safety. Be sure to make and go to all appointments, and call your doctor if you are having problems.  It's also a good idea to know your test results and keep alist of the medicines you take. How can you care for yourself at home?  If possible, stop or reduce the activity that causes your symptoms. If you cannot stop the activity, take frequent breaks to rest and stretch or change hand positions to do a task. Try switching hands, such as when using a computer mouse.  Try to avoid bending or twisting your wrists.  Ask your doctor if you can take an over-the-counter pain medicine, such as acetaminophen (Tylenol), ibuprofen (Advil, Motrin), or naproxen (Aleve). Be safe with medicines. Read and follow all instructions on the label.  If your doctor prescribes corticosteroid medicine to help reduce pain and swelling, take it exactly as prescribed. Call your doctor if you think you are having a problem with your medicine.  Put ice or a cold pack on your wrist for 10 to 20 minutes at a time to ease pain. Put a thin cloth between the ice and your skin.  If your doctor or your physical or occupational therapist tells you to wear a wrist splint, wear it as directed to keep your wrist in a neutral position. This also eases pressure on your median nerve.  Ask your doctor whether you should have physical or occupational therapy to learn how to do tasks differently.  Try a yoga class to stretch your muscles and build strength in your hands and wrists. Yoga has been shown to ease carpal tunnel symptoms. To prevent carpal tunnel   When working at a computer, keep your hands and wrists in line with your forearms. Hold your elbows close to your sides. Take a break every 10 to 15 minutes.  Try these exercises:  ? Warm up: Rotate your wrist up, down, and from side to side. Repeat this 4 times. Stretch your fingers far apart, relax them, then stretch them again. Repeat 4 times. Stretch your thumb by pulling it back gently, holding it, and then releasing it. Repeat 4 times.   ? Prayer stretch: Start with your palms together in front of your chest just below your chin. Slowly lower your hands toward your waistline while keeping your hands close to your stomach and your palms together until you feel a mild to moderate stretch under your forearms. Hold for 10 to 20 seconds. Repeat 4 times. ? Wrist flexor stretch: Hold your arm in front of you with your palm up. Bend your wrist, pointing your hand toward the floor. With your other hand, gently bend your wrist further until you feel a mild to moderate stretch in your forearm. Hold for 10 to 20 seconds. Repeat 4 times. ? Wrist extensor stretch: Repeat the steps for the wrist flexor stretch, but begin with your extended hand palm down.  Squeeze a rubber exercise ball several times a day to keep your hands and fingers strong.  Avoid holding objects (such as a book) in one position for a long time. When possible, use your whole hand to grasp an object. Using just the thumb and index finger can put stress on the wrist.   Do not smoke. It can make this condition worse by reducing blood flow to the median nerve. If you need help quitting, talk to your doctor about stop-smoking programs and medicines. These can increase your chances of quitting for good. When should you call for help? Watch closely for changes in your health, and be sure to contact your doctor if:     Your pain or other problems do not get better with home care.      You want more information about physical or occupational therapy.      You have side effects of your corticosteroid medicine, such as:  ? Weight gain. ? Mood changes. ? Trouble sleeping. ? Bruising easily.      You have any other problems with your medicine. Where can you learn more? Go to https://Daylifesiria.Nvigen. org and sign in to your [employer] account. Enter R432 in the Flavorvanil box to learn more about \"Carpal Tunnel Syndrome: Care Instructions. \"     If you do not have an account, please click on the \"Sign Up Now\" link.   Current as of: July 1, 2021               Content Version: 13.2  © 2006-2022 [employer], [employer]. Care instructions adapted under license by [facility] ([facility]). If you have questions about a medical condition or this instruction, always ask your healthcare professional. [address] any warranty or liability for your use of this information. Patient Education        Carpal Tunnel Syndrome: Exercises  Introduction  Here are some examples of exercises for you to try. The exercises may be suggested for a condition or for rehabilitation. Start each exercise slowly. Ease off the exercises if you start to have pain. You will be told when to start these exercises and which ones will work bestfor you. Warm-up stretches  When you no longer have pain or numbness, you can do exercises to help prevent carpal tunnel syndrome from coming back. Do not do any stretch or movement thatis uncomfortable or painful. 1. Rotate your wrist up, down, and from side to side. Repeat 4 times. 2. Stretch your fingers far apart. Relax them, and then stretch them again. Repeat 4 times. 3. Stretch your thumb by pulling it back gently, holding it, and then releasing it. Repeat 4 times. How to do the exercises  Prayer stretch    1. Start with your palms together in front of your chest just below your chin. 2. Slowly lower your hands toward your waistline, keeping your hands close to your stomach and your palms together until you feel a mild to moderate stretch under your forearms. 3. Hold for at least 15 to 30 seconds. Repeat 2 to 4 times. Wrist flexor stretch    1. Extend your arm in front of you with your palm up. 2. Bend your wrist, pointing your hand toward the floor. 3. With your other hand, gently bend your wrist farther until you feel a mild to moderate stretch in your forearm. 4. Hold for at least 15 to 30 seconds. Repeat 2 to 4 times. Wrist extensor stretch    1.  Repeat steps 1 through 4 of the stretch above, but begin with your extended hand palm down. Follow-up care is a key part of your treatment and safety. Be sure to make and go to all appointments, and call your doctor if you are having problems. It's also a good idea to know your test results and keep alist of the medicines you take. Where can you learn more? Go to https://chpepiceweb.[employer]uaLogistic [email or web address] org and sign in to your [employer]'s Trending account. Enter T175 in the GreenLancer box to learn more about \"Carpal Tunnel Syndrome: Exercises. \"     If you do not have an account, please click on the \"Sign Up Now\" link. Current as of: July 1, 2021               Content Version: 13.2  © 2006-2022 Healthwise, Inc[employer]. Care instructions adapted under license by [facility] ([facility]). If you have questions about a medical condition or this instruction, always ask your healthcare professional. [address] any warranty or liability for your use of this information.

## 2022-06-07 ENCOUNTER — OFFICE VISIT (OUTPATIENT)
Dept: PHYSICAL MEDICINE AND REHAB | Age: 42
End: 2022-06-07
Payer: COMMERCIAL

## 2022-06-07 VITALS
HEIGHT: 64 IN | HEART RATE: 100 BPM | WEIGHT: 165.7 LBS | SYSTOLIC BLOOD PRESSURE: 117 MMHG | DIASTOLIC BLOOD PRESSURE: 73 MMHG | BODY MASS INDEX: 28.29 KG/M2

## 2022-06-07 DIAGNOSIS — R20.0 NUMBNESS AND TINGLING: ICD-10-CM

## 2022-06-07 DIAGNOSIS — M79.18 MYOFASCIAL PAIN: ICD-10-CM

## 2022-06-07 DIAGNOSIS — M54.2 CHRONIC NECK PAIN: Primary | ICD-10-CM

## 2022-06-07 DIAGNOSIS — R20.2 NUMBNESS AND TINGLING: ICD-10-CM

## 2022-06-07 DIAGNOSIS — G89.29 CHRONIC NECK PAIN: Primary | ICD-10-CM

## 2022-06-07 DIAGNOSIS — M47.812 CERVICAL SPONDYLOSIS: ICD-10-CM

## 2022-06-07 PROCEDURE — 99214 OFFICE O/P EST MOD 30 MIN: CPT | Performed by: PHYSICAL MEDICINE & REHABILITATION

## 2022-06-07 NOTE — PROGRESS NOTES
Jaymie Diop, [address] [facility] Physical Medicine [facility] Rehabilitation  [address] [facility] [place]  Phone: [phone]  Fax: [phone]    PCP: Darian Mcmahon DO  Date of visit: 6/7/22    Chief Complaint   Patient presents with    Neck Pain     new patient referral neck pain       Dear Dr. Siri Shelton,     Thank you for referring your patient to be seen. As you know,  Gail Skaggs is a 42 y.o. female with past medical history as below who presents with neck pain for over 1 year. There was a gradual onset of neck and right arm pain after no known injury. Now, the pain is constant and occurs daily. The pain is rated Pain Score:   8, is described as throbbing, tight, and is located in the neck with radiation to the right arm and elbow to hand. The symptoms have been worse since onset. The pain is better with nothing. The pain is worse with activity. There is associated numbness/tingling in right hand There is weakness. There is no bowel/bladder changes. The prior workup has included: Xray, MRI C spine 4/21, EMG LUE 2021    The prior treatment has included:  PT: 2018   Chiropractic: 2018 . Modalities: heat with some relief   OTC Tylenol: yes with no relief   NSAIDS: ibuprofen with no relief   Opioids: none    Membrane stabilizers: cymbalta currently, neurontin currently   Muscle relaxers: in past with no relief   Previous injections: none   Previous surgery at this site: right carpal tunnel release     Allergies   Allergen Reactions    Penicillins Hives    Sulfa Antibiotics Rash     fever       Current Outpatient Medications   Medication Sig Dispense Refill    hydroxychloroquine (PLAQUENIL) 200 MG tablet Take 200 mg by mouth 2 times daily      insulin aspart (NOVOLOG) 100 UNIT/ML injection vial USE VIA INSULIN PUMP.  UNITS PER DAY.  30 mL 11    levothyroxine (SYNTHROID) 88 MCG tablet Take 1 tablet by mouth Daily 90 tablet 3    ONETOUCH ULTRA strip CHECK BLOOD SUGAR FOUR TIMES DAILY 150 strip 5    Levonorgestrel (LILETTA, 52 MG,) IUD 52 mg 1 each by IntraUTERine route once Inserted 1/19/2022      Insulin Disposable Pump (OMNIPOD DASH 5 PACK PODS) MISC USE TO CONTINUOUSLY        ADMINISTER INSULIN AND     CHANGE EVERY 3 DAYS 30 each 3    esomeprazole Magnesium (NEXIUM) 20 MG PACK Take 20 mg by mouth daily      lidocaine viscous hcl (XYLOCAINE) 2 % SOLN solution Use small amount vaginally as needed for pain. 100 mL 0    Insulin Disposable Pump (OMNIPOD DASH SYSTEM) KIT To continuously administer inuslin 1 kit 0    ARIPiprazole (ABILIFY) 2 MG tablet Take 2 mg by mouth daily      Cholecalciferol (VITAMIN D3) 2000 UNITS CAPS Take 2 capsules by mouth daily      gabapentin (NEURONTIN) 100 MG capsule Take 300 mg by mouth nightly. Takes nightly      losartan (COZAAR) 50 MG tablet Take 50 mg by mouth daily  0    DULoxetine (CYMBALTA) 30 MG capsule Take 60 mg by mouth 2 times daily        No current facility-administered medications for this visit.        Past Medical History:   Diagnosis Date    Anxiety     Depression     Diabetes mellitus type 1, controlled, without complications ([facility] [address] 8/9/6385    Epicondylitis, lateral 02/2017    right    GERD (gastroesophageal reflux disease)     Hypertension     IBS (irritable bowel syndrome)     Insulin pump status has insulin pump    follows with Dr. Ariela Navarro    SIRS (systemic inflammatory response syndrome) ([facility] [address] 1/8/2016    Thyroid disease     Trigger finger     right index and little finger       Past Surgical History:   Procedure Laterality Date    CARPAL TUNNEL RELEASE  2011    left hand     DEBRIDEMENT Right 02/14/2017    Right lateral epicondylar debridement    ENDOSCOPY, COLON, DIAGNOSTIC      FINGER TRIGGER RELEASE      middle finger bilat, and right thumb  / multiple  / last one 2017    FINGER TRIGGER RELEASE Right 08/12/2016    4th finger    FINGER TRIGGER RELEASE Right 4/3/2019    TRIGGER RELEASE RIGHT INDEX AND SMALL FINGER performed by Stormy Maharaj MD at [facility] [address]  2009    exploratory     [place]  DELIVERY ONLY  x2    , Low Cervical    SKIN BIOPSY         Family History   Problem Relation Age of Onset    Cancer Mother     High Blood Pressure Maternal Grandmother        Social History     Tobacco Use    Smoking status: Never Smoker    Smokeless tobacco: Never Used   Vaping Use    Vaping Use: Never used   Substance Use Topics    Alcohol use: Yes     Alcohol/week: 0.0 standard drinks     Comment: socially    Drug use: No          Functional Status: The patient is able to ambulate and perform activities of daily living without the use of an assistive device. ROS: For more complete ROS answered by the patient, please see . Constitutional: Denies fevers, chills, night sweats, unintentional weight loss, +fatigeu     Skin: Denies rash or skin changes     Eyes: Denies vision changes    Ears/Nose/Throat: Denies nasal congestion or sore throat     Respiratory: Denies SOB or cough     Cardiovascular: Denies CP, palpitations, edema      Gastrointestinal: Denies abdominal pain,  N/V, constipation, or diarrhea    Genitourinary: Denies urinary symptoms    Neurologic: See HPI.     MSK: See HPI. Psychiatric: Denies sleep disturbance, +anxious    Hematologic/Lymphatic/Immunologic: Denies bruising       Physical Exam:   Blood pressure 117/73, pulse 100, height 5' 4\" (1.626 m), weight 165 lb 11.2 oz (75.2 kg), not currently breastfeeding. General: well developed and well nourished in no acute distress. Body habitus is normal.   HEENT: No rhinorrhea, sneezing, yawning, or lacrimation. No scleral icterus or conjunctival injection. Resp: symmetrical chest expansion, unlabored breathing, respirations unlabored. CV: Heart rate is regular. Peripheral pulses are palpable  Lymph: No visible regional lymphadenopathy. Skin: No rashes or ecchymosis. Normal turgor. Psych: Mood is calm. Affect is normal.   Ext: No edema noted     MSK:   Cervical Exam:   Inspection: Shoulder girdles were asymmetric. The cervical lordotic curvature was decreased. Palpatory exam: revealed tenderness along bilateral lower cervical paraspinals , no tenderness along upper, middle and lower spinous processes, tenderness of bilateral upper trapezius muscle. There was spasm of the upper traps. There were trigger points. ROM: ROM decreased  Special/Provocative tests:   Spurling's maneuver was negative. Tenderness over right forearm extensor muscle group. Neurological Exam:  Strength:   R  L  Deltoid   5  5  Biceps   5  5  Triceps  5  5  Wrist Ext  5  5  Finger Abd  5  5    Sensory:  Intact for light touch in all upper extremity dermatomes. Reflexes:   R  L  Biceps   (2+) (2+)  Triceps  (2+) (2+)  BR   (2+) (2+)      Olivo is negative bilaterally. Gait is normal    Imaging: (personally reviewed by me 06/07/22)  MRI C spine 2021   EMG LUE 3/2021     Impression:   Qing Wiley is a 42 y.o. female       1. Chronic neck pain    2. Myofascial pain    3. Cervical spondylosis    4. Numbness and tingling        Plan:   · Will refer to PT   · Cervical MRI reviewed- NF stenosis C6-7, mild   · EMG reviewed. EMG was done on LUE. Symptoms are now on the right. Will do EMG RUE- UNE, cervical radic   · Continue medications per prescribing physician      The patient was educated about the diagnosis, prognosis, indications, risks and benefits of treatment. An opportunity to ask questions was given to the patient and questions were answered. The patient agreed to proceed with the recommended treatment as described above.  Follow up after PT      Thank you for the consultation and for allowing me to participate in the care of this patient.         Sincerely,     Patito Kennedy DO, [facility]   Board Certified Physical Medicine and Rehabilitation

## 2022-06-23 ENCOUNTER — OFFICE VISIT (OUTPATIENT)
Dept: PHYSICAL MEDICINE AND REHAB | Age: 42
End: 2022-06-23
Payer: COMMERCIAL

## 2022-06-23 VITALS — WEIGHT: 165 LBS | HEIGHT: 64 IN | BODY MASS INDEX: 28.17 KG/M2

## 2022-06-23 DIAGNOSIS — R20.2 NUMBNESS AND TINGLING: ICD-10-CM

## 2022-06-23 DIAGNOSIS — G56.01 RIGHT CARPAL TUNNEL SYNDROME: Primary | ICD-10-CM

## 2022-06-23 DIAGNOSIS — R20.0 NUMBNESS AND TINGLING: ICD-10-CM

## 2022-06-23 DIAGNOSIS — G56.21 ULNAR NEUROPATHY OF RIGHT UPPER EXTREMITY: ICD-10-CM

## 2022-06-23 PROCEDURE — 95910 NRV CNDJ TEST 7-8 STUDIES: CPT | Performed by: PHYSICAL MEDICINE & REHABILITATION

## 2022-06-23 PROCEDURE — 95886 MUSC TEST DONE W/N TEST COMP: CPT | Performed by: PHYSICAL MEDICINE & REHABILITATION

## 2022-06-23 NOTE — PATIENT INSTRUCTIONS
Electrodiagnotic Laboratory  Accredited by the [facility] with Exemplary status  GAYLA Stahl D.O. [facility]  [address] [facility] [place]  Phone: [phone]  Fax: [phone]        Today you had an electrodiagnostic exam which included nerve conduction studies (NCS) and electromyography (EMG). This test evaluated the electrical activity of your nerves and muscles to help determine if you have a nerve or muscle disease. This test can help determine the location and type of a nerve or muscle problem. This will help your referring doctor diagnose your condition and determine the appropriate next step in your treatment plan. After your test:    1. There are no long lasting side effects of the test.     2. You may resume your normal activities without restrictions. 3.  Resume any medications that were stopped for the test.     4  If you have sore areas or bruising in your muscles where the needle was placed, apply a cold pack to the sore area for 15-20 minutes three to four times a day as needed for pain. The soreness should go away in about 1-2 days. 5. Your results were provided  Briefly at the end of your test and the final detailed report will be provided to your referring physician, and/or primary care physician and any other parties you requested within 1-2 days of the examination. You may wish to contact your referring provider after a few days to determine what they would like you to do next. 6.  Please call [phone] with any questions or concerns and if you develop increased body temperature/fever, swelling, tenderness, increased pain and/or drainage from the sites where the needle was placed. Thank you for choosing us for your health care needs.

## 2022-06-23 NOTE — PROGRESS NOTES
[address] [facility]  Electrodiagnostic Laboratory  *Accredited by the [address] [place] [address] with exemplary status  [address]  Phone: [phone]  Fax: [phone]    Referring Provider: Marcio Brothers  Primary Care Physician: Shruthi Sanchez DO  Patient Name: Ariel Goncalves  Patient YOB: 1980  Gender: female  BMI: Body mass index is 28.32 kg/m². Height 5' 4\" (1.626 m), weight 165 lb (74.8 kg), not currently breastfeeding. 6/23/2022    Reason for Referral: Cervical radic vs UNE    Description of clinical problem:   Chief Complaint   Patient presents with    Extremity Pain     pain in the right elbow. 8/10 pain. 1+ year of symp.  Numbness     entire hand numbness.  Extremity Weakness     decrease strength. Brief physical exam:   Sensory deficit No; Weakness No; Atrophy  No; Reflex abnormality No    Sensory NCS      Nerve / Sites Rec. Site Peak Lat PP Amp Segments Distance Velocity Temp. ms µV  cm m/s °C   R Median - Digit II (Antidromic)      Palm Dig II 2.40 30.4 Palm - Dig II 7 46 32.2      Wrist Dig II 4.06 29.3 Wrist - Dig II 14 44 32.2   R Ulnar - Digit V (Antidromic)      Wrist Dig V 4.06 42.1 Wrist - Dig V 14 44 32.2   R Radial - Anatomical snuff box (Forearm)      Forearm Wrist 2.71 15.0 Forearm - Wrist 10 49 32.2   R Dorsal ulnar cutaneous - Hand dorsum (Forearm)      Forearm Hand dorsum 2.40 10.7 Forearm - Hand dorsum 8 43 32.2       Motor NCS      Nerve / Sites Muscle Onset Amplitude Segments Distance Velocity Temp.     ms mV  cm m/s °C   R Median - APB      Palm APB 1.98 13.1 Palm - APB   32.3      Wrist APB 4.22 7.7 Wrist - Palm 8 36 32.3      Elbow APB 8.18 7.4 Elbow - Wrist 20 51 32.3   R Ulnar - ADM      Wrist ADM 3.13 14.2 Wrist - ADM 8  32.2      B. Elbow ADM 6.61 11.4 B. Elbow - Wrist 17 49* 32.2      A. Elbow ADM 9.06 11.3 A. Elbow - B. Elbow 10 41* 32.2   R Ulnar - FDI      Wrist FDI 3.85 12.6 Wrist - FDI   32.2 B.Elbow FDI 7.45 11.6 B. Elbow - Wrist 17 47* 32.2      A. Elbow FDI 9.64 11.4 A. Elbow - B. Elbow 10 46* 32.2       F  Wave      Nerve Fmin % F    ms %   R Median - APB 28.23 10   R Ulnar - ADM 28.02 50       EMG      EMG Summary Table     Spontaneous MUAP Recruitment   Muscle Nerve Roots IA Fib PSW Fasc Amp Dur. PPP Pattern   R. Biceps brachii Musculocutaneous C5-C6 N None None None N N N N   R. Triceps brachii Radial C6-C8 N None None None N N N N   R. Pronator teres Median C6-C7 N None None None N N N N   R. Flexor carpi ulnaris Ulnar C7-T1 N None None None N N N N   R. First dorsal interosseous Ulnar C8-T1 N None None None N N N N   R. Abductor pollicis brevis Median B9-C1 N None None None N N N Reduced   R. Cervical paraspinals (up)  - N None None None N N N N   R. Cervical paraspinals (mid)  - N None None None N N N N   R. Cervical paraspinals (low)  - N None None None N N N N       Study Limitations:  None     Summary of Findings:   Nerve conduction studies:   · The following nerve conduction studies were abnormal: sensory studies of right median nerve showed prolonged peak wrist latency. Sensory studies of right ulnar nerve showed prolonged peak latency. Sensory studies of dorsal ulnar cutaneous nerve showed small amplitude. Motor studies of right median nerve showed slowing across the wrist. Motor studies of ulnar nerve to abductor digiti minimi and first dorsal interosseous revealed mild slowing throughout with normal distal latencies and amplitudes. · All other nerve conduction studies, as listed in the table were normal in latency, amplitude and conduction velocity. Needle EMG:   · Needle EMG was performed using a concentric needle. · The following abnormalities were seen on needle EMG: there was decreased recruitment in the right abductor pollicis brevis muscle. · Otherwise, observed motor units were normal in amplitude, duration, phases and recruitment and no active denervation signs were seen. Diagnostic Interpretation: This study was abnormal.     1. 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 It is chronic in duration, mild in severity. Prognosis for demyelinating lesions is good, poor for axonal.     2. 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 It is chronic in duration. This is consistent with the clinical diagnosis of carpal tunnel syndrome. Prognosis is good for demyelinating lesions. She is scheduled to start PT for neck pain and I will continue to follow her for this. She will follow up with Dr. Debo Romero regarding carpal tunnel and ulnar neuropathy as she follows him for left side as well. Previous Study: There is not a prior study on the right upper extremity for comparison. Follow up EMG is recommended in 1 year if symptoms persist despite treatment. Technologist: Jose Shen  Physician:Irma Lin, DO, [facility]   Board Certified Physical Medicine and Rehabilitation      Nerve conduction studies and electromyography were performed according to our laboratory policies and procedures which can be provided upon request. All abnormal values are identified in the table.  Laboratory normal values can also be provided upon request.       Cc: Dami Pressley DO

## 2022-08-03 DIAGNOSIS — E10.9 TYPE 1 DIABETES MELLITUS WITHOUT COMPLICATION (HCC): Primary | ICD-10-CM

## 2022-08-03 RX ORDER — INSULIN PMP CART,AUT,G6/7,CNTR
EACH SUBCUTANEOUS
Qty: 10 EACH | Refills: 3 | Status: SHIPPED | OUTPATIENT
Start: 2022-08-03

## 2022-08-03 RX ORDER — INSULIN PMP CART,AUT,G6/7,CNTR
EACH SUBCUTANEOUS
Qty: 1 KIT | Refills: 0 | Status: SHIPPED
Start: 2022-08-03 | End: 2022-09-13 | Stop reason: SDUPTHER

## 2022-08-13 ENCOUNTER — APPOINTMENT (OUTPATIENT)
Dept: GENERAL RADIOLOGY | Age: 42
DRG: 639 | End: 2022-08-13
Payer: COMMERCIAL

## 2022-08-13 ENCOUNTER — HOSPITAL ENCOUNTER (INPATIENT)
Age: 42
LOS: 2 days | Discharge: HOME | DRG: 639 | End: 2022-08-15
Attending: STUDENT IN AN ORGANIZED HEALTH CARE EDUCATION/TRAINING PROGRAM | Admitting: INTERNAL MEDICINE
Payer: COMMERCIAL

## 2022-08-13 DIAGNOSIS — E10.10 TYPE 1 DIABETES MELLITUS WITH KETOACIDOSIS WITHOUT COMA (HCC): Primary | ICD-10-CM

## 2022-08-13 DIAGNOSIS — R07.9 CHEST PAIN, UNSPECIFIED TYPE: ICD-10-CM

## 2022-08-13 DIAGNOSIS — E10.9 TYPE 1 DIABETES MELLITUS WITHOUT COMPLICATION (HCC): ICD-10-CM

## 2022-08-13 LAB
ANION GAP SERPL CALCULATED.3IONS-SCNC: 10 MMOL/L (ref 7–16)
ANION GAP SERPL CALCULATED.3IONS-SCNC: 11 MMOL/L (ref 7–16)
ANION GAP SERPL CALCULATED.3IONS-SCNC: 18 MMOL/L (ref 7–16)
BACTERIA: ABNORMAL /HPF
BETA-HYDROXYBUTYRATE: 0.54 MMOL/L (ref 0.02–0.27)
BETA-HYDROXYBUTYRATE: 2.05 MMOL/L (ref 0.02–0.27)
BILIRUBIN URINE: NEGATIVE
BLOOD, URINE: ABNORMAL
BUN BLDV-MCNC: 16 MG/DL (ref 6–20)
BUN BLDV-MCNC: 18 MG/DL (ref 6–20)
BUN BLDV-MCNC: 19 MG/DL (ref 6–20)
CALCIUM SERPL-MCNC: 8.4 MG/DL (ref 8.6–10.2)
CALCIUM SERPL-MCNC: 8.5 MG/DL (ref 8.6–10.2)
CALCIUM SERPL-MCNC: 9.7 MG/DL (ref 8.6–10.2)
CHLORIDE BLD-SCNC: 103 MMOL/L (ref 98–107)
CHLORIDE BLD-SCNC: 106 MMOL/L (ref 98–107)
CHLORIDE BLD-SCNC: 95 MMOL/L (ref 98–107)
CHP ED QC CHECK: NORMAL
CLARITY: CLEAR
CO2: 18 MMOL/L (ref 22–29)
CO2: 20 MMOL/L (ref 22–29)
CO2: 23 MMOL/L (ref 22–29)
COLOR: YELLOW
CREAT SERPL-MCNC: 0.9 MG/DL (ref 0.5–1)
CREAT SERPL-MCNC: 1 MG/DL (ref 0.5–1)
CREAT SERPL-MCNC: 1.1 MG/DL (ref 0.5–1)
D DIMER: <200 NG/ML DDU
GFR AFRICAN AMERICAN: >60
GFR NON-AFRICAN AMERICAN: 54 ML/MIN/1.73
GFR NON-AFRICAN AMERICAN: >60 ML/MIN/1.73
GFR NON-AFRICAN AMERICAN: >60 ML/MIN/1.73
GLUCOSE BLD-MCNC: 149 MG/DL (ref 74–99)
GLUCOSE BLD-MCNC: 275 MG/DL (ref 74–99)
GLUCOSE BLD-MCNC: 370 MG/DL
GLUCOSE BLD-MCNC: 89 MG/DL (ref 74–99)
GLUCOSE URINE: >=1000 MG/DL
HBA1C MFR BLD: 6.6 % (ref 4–5.6)
HCG, URINE, POC: NEGATIVE
HCT VFR BLD CALC: 41.1 % (ref 34–48)
HEMOGLOBIN: 14 G/DL (ref 11.5–15.5)
KETONES, URINE: >=80 MG/DL
LEUKOCYTE ESTERASE, URINE: NEGATIVE
Lab: NORMAL
MAGNESIUM: 1.7 MG/DL (ref 1.6–2.6)
MCH RBC QN AUTO: 31.5 PG (ref 26–35)
MCHC RBC AUTO-ENTMCNC: 34.1 % (ref 32–34.5)
MCV RBC AUTO: 92.6 FL (ref 80–99.9)
METER GLUCOSE: 123 MG/DL (ref 74–99)
METER GLUCOSE: 166 MG/DL (ref 74–99)
METER GLUCOSE: 173 MG/DL (ref 74–99)
METER GLUCOSE: 318 MG/DL (ref 74–99)
METER GLUCOSE: 81 MG/DL (ref 74–99)
METER GLUCOSE: 99 MG/DL (ref 74–99)
NEGATIVE QC PASS/FAIL: NORMAL
NITRITE, URINE: NEGATIVE
PDW BLD-RTO: 12.1 FL (ref 11.5–15)
PH UA: 6 (ref 5–9)
PH VENOUS: 7.3 (ref 7.35–7.45)
PHOSPHORUS: 2.5 MG/DL (ref 2.5–4.5)
PLATELET # BLD: 298 E9/L (ref 130–450)
PMV BLD AUTO: 9.3 FL (ref 7–12)
POSITIVE QC PASS/FAIL: NORMAL
POTASSIUM REFLEX MAGNESIUM: 4.2 MMOL/L (ref 3.5–5)
POTASSIUM SERPL-SCNC: 3.9 MMOL/L (ref 3.5–5)
POTASSIUM SERPL-SCNC: 4.9 MMOL/L (ref 3.5–5)
PROTEIN UA: NEGATIVE MG/DL
RBC # BLD: 4.44 E12/L (ref 3.5–5.5)
RBC UA: ABNORMAL /HPF (ref 0–2)
REASON FOR REJECTION: NORMAL
REJECTED TEST: NORMAL
SARS-COV-2, NAAT: NOT DETECTED
SODIUM BLD-SCNC: 131 MMOL/L (ref 132–146)
SODIUM BLD-SCNC: 136 MMOL/L (ref 132–146)
SODIUM BLD-SCNC: 137 MMOL/L (ref 132–146)
SPECIFIC GRAVITY UA: 1.01 (ref 1–1.03)
T4 FREE: 1.48 NG/DL (ref 0.93–1.7)
TROPONIN, HIGH SENSITIVITY: 10 NG/L (ref 0–9)
TROPONIN, HIGH SENSITIVITY: 8 NG/L (ref 0–9)
TSH SERPL DL<=0.05 MIU/L-ACNC: 9.53 UIU/ML (ref 0.27–4.2)
UROBILINOGEN, URINE: 1 E.U./DL
WBC # BLD: 14.8 E9/L (ref 4.5–11.5)
WBC UA: ABNORMAL /HPF (ref 0–5)

## 2022-08-13 PROCEDURE — 84100 ASSAY OF PHOSPHORUS: CPT

## 2022-08-13 PROCEDURE — 83036 HEMOGLOBIN GLYCOSYLATED A1C: CPT

## 2022-08-13 PROCEDURE — 84443 ASSAY THYROID STIM HORMONE: CPT

## 2022-08-13 PROCEDURE — 85378 FIBRIN DEGRADE SEMIQUANT: CPT

## 2022-08-13 PROCEDURE — 84484 ASSAY OF TROPONIN QUANT: CPT

## 2022-08-13 PROCEDURE — 96360 HYDRATION IV INFUSION INIT: CPT

## 2022-08-13 PROCEDURE — 99285 EMERGENCY DEPT VISIT HI MDM: CPT

## 2022-08-13 PROCEDURE — 71046 X-RAY EXAM CHEST 2 VIEWS: CPT

## 2022-08-13 PROCEDURE — 6370000000 HC RX 637 (ALT 250 FOR IP): Performed by: STUDENT IN AN ORGANIZED HEALTH CARE EDUCATION/TRAINING PROGRAM

## 2022-08-13 PROCEDURE — 36415 COLL VENOUS BLD VENIPUNCTURE: CPT

## 2022-08-13 PROCEDURE — 85027 COMPLETE CBC AUTOMATED: CPT

## 2022-08-13 PROCEDURE — 83735 ASSAY OF MAGNESIUM: CPT

## 2022-08-13 PROCEDURE — 87635 SARS-COV-2 COVID-19 AMP PRB: CPT

## 2022-08-13 PROCEDURE — 82962 GLUCOSE BLOOD TEST: CPT

## 2022-08-13 PROCEDURE — 82010 KETONE BODYS QUAN: CPT

## 2022-08-13 PROCEDURE — 84439 ASSAY OF FREE THYROXINE: CPT

## 2022-08-13 PROCEDURE — 82800 BLOOD PH: CPT

## 2022-08-13 PROCEDURE — 2060000000 HC ICU INTERMEDIATE R&B

## 2022-08-13 PROCEDURE — 96361 HYDRATE IV INFUSION ADD-ON: CPT

## 2022-08-13 PROCEDURE — 81001 URINALYSIS AUTO W/SCOPE: CPT

## 2022-08-13 PROCEDURE — 6370000000 HC RX 637 (ALT 250 FOR IP): Performed by: PHYSICIAN ASSISTANT

## 2022-08-13 PROCEDURE — 6360000002 HC RX W HCPCS: Performed by: PHYSICIAN ASSISTANT

## 2022-08-13 PROCEDURE — 2580000003 HC RX 258: Performed by: STUDENT IN AN ORGANIZED HEALTH CARE EDUCATION/TRAINING PROGRAM

## 2022-08-13 PROCEDURE — 80048 BASIC METABOLIC PNL TOTAL CA: CPT

## 2022-08-13 PROCEDURE — 93005 ELECTROCARDIOGRAM TRACING: CPT | Performed by: PHYSICIAN ASSISTANT

## 2022-08-13 RX ORDER — LOSARTAN POTASSIUM 25 MG/1
50 TABLET ORAL DAILY
Status: DISCONTINUED | OUTPATIENT
Start: 2022-08-13 | End: 2022-08-15 | Stop reason: HOSPADM

## 2022-08-13 RX ORDER — MELOXICAM 7.5 MG/1
7.5 TABLET ORAL 2 TIMES DAILY
COMMUNITY

## 2022-08-13 RX ORDER — MAGNESIUM SULFATE 1 G/100ML
1000 INJECTION INTRAVENOUS PRN
Status: DISCONTINUED | OUTPATIENT
Start: 2022-08-13 | End: 2022-08-15 | Stop reason: HOSPADM

## 2022-08-13 RX ORDER — INSULIN LISPRO 100 [IU]/ML
0-4 INJECTION, SOLUTION INTRAVENOUS; SUBCUTANEOUS NIGHTLY
Status: DISCONTINUED | OUTPATIENT
Start: 2022-08-13 | End: 2022-08-14

## 2022-08-13 RX ORDER — HYDROXYCHLOROQUINE SULFATE 200 MG/1
200 TABLET, FILM COATED ORAL 2 TIMES DAILY
Status: DISCONTINUED | OUTPATIENT
Start: 2022-08-13 | End: 2022-08-15 | Stop reason: HOSPADM

## 2022-08-13 RX ORDER — SODIUM CHLORIDE 9 MG/ML
INJECTION, SOLUTION INTRAVENOUS CONTINUOUS
Status: DISCONTINUED | OUTPATIENT
Start: 2022-08-13 | End: 2022-08-13

## 2022-08-13 RX ORDER — POTASSIUM CHLORIDE 7.45 MG/ML
10 INJECTION INTRAVENOUS PRN
Status: DISCONTINUED | OUTPATIENT
Start: 2022-08-13 | End: 2022-08-15 | Stop reason: HOSPADM

## 2022-08-13 RX ORDER — DEXTROSE AND SODIUM CHLORIDE 5; .45 G/100ML; G/100ML
INJECTION, SOLUTION INTRAVENOUS CONTINUOUS PRN
Status: DISCONTINUED | OUTPATIENT
Start: 2022-08-13 | End: 2022-08-13 | Stop reason: SDUPTHER

## 2022-08-13 RX ORDER — TRAZODONE HYDROCHLORIDE 50 MG/1
100 TABLET ORAL NIGHTLY
Status: DISCONTINUED | OUTPATIENT
Start: 2022-08-13 | End: 2022-08-15 | Stop reason: HOSPADM

## 2022-08-13 RX ORDER — GABAPENTIN 300 MG/1
300 CAPSULE ORAL NIGHTLY
Status: DISCONTINUED | OUTPATIENT
Start: 2022-08-13 | End: 2022-08-15 | Stop reason: HOSPADM

## 2022-08-13 RX ORDER — DEXTROSE AND SODIUM CHLORIDE 5; .45 G/100ML; G/100ML
INJECTION, SOLUTION INTRAVENOUS CONTINUOUS PRN
Status: DISCONTINUED | OUTPATIENT
Start: 2022-08-13 | End: 2022-08-13

## 2022-08-13 RX ORDER — TRAZODONE HYDROCHLORIDE 100 MG/1
100 TABLET ORAL NIGHTLY
COMMUNITY

## 2022-08-13 RX ORDER — MAGNESIUM SULFATE 1 G/100ML
1000 INJECTION INTRAVENOUS PRN
Status: DISCONTINUED | OUTPATIENT
Start: 2022-08-13 | End: 2022-08-13

## 2022-08-13 RX ORDER — NITROGLYCERIN 0.4 MG/1
0.4 TABLET SUBLINGUAL ONCE
Status: COMPLETED | OUTPATIENT
Start: 2022-08-13 | End: 2022-08-13

## 2022-08-13 RX ORDER — ARIPIPRAZOLE 2 MG/1
2 TABLET ORAL DAILY
Status: DISCONTINUED | OUTPATIENT
Start: 2022-08-13 | End: 2022-08-15 | Stop reason: HOSPADM

## 2022-08-13 RX ORDER — ENOXAPARIN SODIUM 100 MG/ML
40 INJECTION SUBCUTANEOUS DAILY
Status: DISCONTINUED | OUTPATIENT
Start: 2022-08-13 | End: 2022-08-15 | Stop reason: HOSPADM

## 2022-08-13 RX ORDER — ESOMEPRAZOLE MAGNESIUM 20 MG/1
40 FOR SUSPENSION ORAL DAILY
Status: DISCONTINUED | OUTPATIENT
Start: 2022-08-14 | End: 2022-08-13 | Stop reason: CLARIF

## 2022-08-13 RX ORDER — MELOXICAM 7.5 MG/1
7.5 TABLET ORAL 2 TIMES DAILY
Status: DISCONTINUED | OUTPATIENT
Start: 2022-08-13 | End: 2022-08-15 | Stop reason: HOSPADM

## 2022-08-13 RX ORDER — BUSPIRONE HYDROCHLORIDE 15 MG/1
15 TABLET ORAL 2 TIMES DAILY
COMMUNITY

## 2022-08-13 RX ORDER — INSULIN LISPRO 100 [IU]/ML
0-4 INJECTION, SOLUTION INTRAVENOUS; SUBCUTANEOUS
Status: DISCONTINUED | OUTPATIENT
Start: 2022-08-14 | End: 2022-08-14

## 2022-08-13 RX ORDER — ESOMEPRAZOLE MAGNESIUM 20 MG/1
20 FOR SUSPENSION ORAL DAILY
Status: DISCONTINUED | OUTPATIENT
Start: 2022-08-13 | End: 2022-08-13

## 2022-08-13 RX ORDER — LEVOTHYROXINE SODIUM 88 UG/1
88 TABLET ORAL DAILY
Status: DISCONTINUED | OUTPATIENT
Start: 2022-08-14 | End: 2022-08-15

## 2022-08-13 RX ORDER — DULOXETIN HYDROCHLORIDE 60 MG/1
60 CAPSULE, DELAYED RELEASE ORAL 2 TIMES DAILY
Status: DISCONTINUED | OUTPATIENT
Start: 2022-08-13 | End: 2022-08-15 | Stop reason: HOSPADM

## 2022-08-13 RX ORDER — 0.9 % SODIUM CHLORIDE 0.9 %
1000 INTRAVENOUS SOLUTION INTRAVENOUS ONCE
Status: COMPLETED | OUTPATIENT
Start: 2022-08-13 | End: 2022-08-13

## 2022-08-13 RX ORDER — PANTOPRAZOLE SODIUM 40 MG/1
40 TABLET, DELAYED RELEASE ORAL
Status: DISCONTINUED | OUTPATIENT
Start: 2022-08-14 | End: 2022-08-15 | Stop reason: HOSPADM

## 2022-08-13 RX ORDER — 0.9 % SODIUM CHLORIDE 0.9 %
15 INTRAVENOUS SOLUTION INTRAVENOUS ONCE
Status: COMPLETED | OUTPATIENT
Start: 2022-08-13 | End: 2022-08-13

## 2022-08-13 RX ORDER — POLYETHYLENE GLYCOL 3350 17 G/17G
17 POWDER, FOR SOLUTION ORAL DAILY PRN
Status: DISCONTINUED | OUTPATIENT
Start: 2022-08-13 | End: 2022-08-15 | Stop reason: HOSPADM

## 2022-08-13 RX ADMIN — DULOXETINE HYDROCHLORIDE 60 MG: 60 CAPSULE, DELAYED RELEASE ORAL at 22:54

## 2022-08-13 RX ADMIN — POTASSIUM CHLORIDE 10 MEQ: 7.46 INJECTION, SOLUTION INTRAVENOUS at 19:13

## 2022-08-13 RX ADMIN — TRAZODONE HYDROCHLORIDE 100 MG: 50 TABLET ORAL at 22:51

## 2022-08-13 RX ADMIN — DEXTROSE AND SODIUM CHLORIDE: 5; 450 INJECTION, SOLUTION INTRAVENOUS at 15:06

## 2022-08-13 RX ADMIN — HYDROXYCHLOROQUINE SULFATE 200 MG: 200 TABLET ORAL at 22:52

## 2022-08-13 RX ADMIN — INSULIN LISPRO 4 UNITS: 100 INJECTION, SOLUTION INTRAVENOUS; SUBCUTANEOUS at 21:18

## 2022-08-13 RX ADMIN — NITROGLYCERIN 0.4 MG: 0.4 TABLET, ORALLY DISINTEGRATING SUBLINGUAL at 09:53

## 2022-08-13 RX ADMIN — SODIUM CHLORIDE 1000 ML: 9 INJECTION, SOLUTION INTRAVENOUS at 09:48

## 2022-08-13 RX ADMIN — ARIPIPRAZOLE 2 MG: 2 TABLET ORAL at 22:51

## 2022-08-13 RX ADMIN — GABAPENTIN 300 MG: 300 CAPSULE ORAL at 23:14

## 2022-08-13 RX ADMIN — POTASSIUM CHLORIDE 10 MEQ: 7.46 INJECTION, SOLUTION INTRAVENOUS at 18:05

## 2022-08-13 RX ADMIN — SODIUM CHLORIDE: 9 INJECTION, SOLUTION INTRAVENOUS at 13:58

## 2022-08-13 RX ADMIN — BUSPIRONE HYDROCHLORIDE 15 MG: 10 TABLET ORAL at 22:50

## 2022-08-13 RX ADMIN — SODIUM CHLORIDE 1143 ML: 9 INJECTION, SOLUTION INTRAVENOUS at 12:15

## 2022-08-13 RX ADMIN — MELOXICAM 7.5 MG: 7.5 TABLET ORAL at 22:53

## 2022-08-13 ASSESSMENT — PAIN DESCRIPTION - ORIENTATION: ORIENTATION: MID

## 2022-08-13 ASSESSMENT — PAIN SCALES - GENERAL
PAINLEVEL_OUTOF10: 7
PAINLEVEL_OUTOF10: 8

## 2022-08-13 ASSESSMENT — PAIN DESCRIPTION - LOCATION: LOCATION: CHEST

## 2022-08-13 ASSESSMENT — ENCOUNTER SYMPTOMS
DIARRHEA: 0
NAUSEA: 0
PHOTOPHOBIA: 0
ABDOMINAL PAIN: 0
COUGH: 0
VOMITING: 0
ABDOMINAL DISTENTION: 0
SHORTNESS OF BREATH: 0
CHEST TIGHTNESS: 0

## 2022-08-13 ASSESSMENT — PAIN - FUNCTIONAL ASSESSMENT: PAIN_FUNCTIONAL_ASSESSMENT: NONE - DENIES PAIN

## 2022-08-13 NOTE — ED PROVIDER NOTES
Stephan Jimenez is a 36year old female with PMH of DM, who presented to emergency department with concern for chest pain. Patient had substernal chest pain rating to left shoulder. Patient began to have symptoms around 5 AM this morning. Pain is a constant pain nothing makes it better or worse symptoms are moderate in severity and constant. Patient also has a history of hypothyroidism. Patient states she has been compliant with her medication patient not have the last time she had her thyroid levels checked. Patient denies fever, chills, nausea, vomiting patient denies cough patient denies sick contacts. Patient denies a history of CAD in the make symptoms better or worse symptoms are moderate severity constant and unchanged. Patient denies hx of VTE. Patient denies weakness numbness tingling of extremities  Patient has a CGM and insulin pump. Patient stated that she noticed after being brought back to the room in the emergency department that her blood sugar was over 300 on her monitor    The history is provided by the patient, medical records and a relative. Review of Systems   Constitutional:  Negative for chills, diaphoresis, fatigue and fever. Eyes:  Negative for photophobia and visual disturbance. Respiratory:  Negative for cough, chest tightness and shortness of breath. Cardiovascular:  Positive for chest pain. Negative for palpitations and leg swelling. Gastrointestinal:  Negative for abdominal distention, abdominal pain, diarrhea, nausea and vomiting. Genitourinary:  Negative for dysuria. Musculoskeletal:  Negative for neck pain and neck stiffness. Skin:  Negative for pallor and rash. Neurological:  Negative for headaches. Psychiatric/Behavioral:  Negative for confusion. Physical Exam  Vitals and nursing note reviewed. Constitutional:       General: She is not in acute distress. Appearance: Normal appearance. She is not ill-appearing.    HENT:      Head: Normocephalic and atraumatic. Eyes:      General: No scleral icterus. Conjunctiva/sclera: Conjunctivae normal.      Pupils: Pupils are equal, round, and reactive to light. Cardiovascular:      Rate and Rhythm: Regular rhythm. Tachycardia present. Pulmonary:      Effort: Pulmonary effort is normal.      Breath sounds: Normal breath sounds. Abdominal:      General: Bowel sounds are normal. There is no distension. Palpations: Abdomen is soft. Tenderness: There is no abdominal tenderness. There is no guarding or rebound. Musculoskeletal:      Cervical back: Normal range of motion and neck supple. No rigidity. No muscular tenderness. Right lower leg: No edema. Left lower leg: No edema. Skin:     General: Skin is warm and dry. Capillary Refill: Capillary refill takes less than 2 seconds. Coloration: Skin is not pale. Findings: No erythema or rash. Neurological:      Mental Status: She is alert and oriented to person, place, and time. Psychiatric:         Mood and Affect: Mood normal.        Procedures     MDM  Number of Diagnoses or Management Options  Diagnosis management comments: Hammad Perez is a 36year old female who presented to ED with chest tightness and tachycardia, patient changed her insulin pump last night and was hyperglycemic in ED, patient was found to have anion gap 18 bicarb 18 and pH 7.3 with elevated beta hydroxy, patient was given 2L IVF with some improvement of tachycardia, patient was placed on insulin drip troponin 8, patient was given one nitro and chest pain resolved, patient has been diabetic for 41 years significant risk factor for chest pain  Patient had stable EKG  Patient stable tachycardia improved  Patient will be admitted for DKA, chest pain resolved         ED Course as of 08/13/22 2014   Sat Aug 13, 2022   1251 Patient accepted for admission by MAJOR [SS]   1613 EKG: This EKG is signed and interpreted by me.     Rate: 122  Rhythm: Sinus  Interpretation: non-specific EKG, no St elevation, normal axis, Qtc 438  Comparison: no previous EKG   [SS]      ED Course User Index  [SS] Jhon Raza MD        ED Course as of 22   Sat Aug 13, 2022   1251 Patient accepted for admission by MAJOR [SS]   1613 EKG: This EKG is signed and interpreted by me. Rate: 122  Rhythm: Sinus  Interpretation: non-specific EKG, no St elevation, normal axis, Qtc 438  Comparison: no previous EKG   [SS]      ED Course User Index  [SS] Jhon Raza MD       --------------------------------------------- PAST HISTORY ---------------------------------------------  Past Medical History:  has a past medical history of Anxiety, Depression, Diabetes mellitus type 1, controlled, without complications ([facility] [address] Epicondylitis, lateral, GERD (gastroesophageal reflux disease), Hypertension, IBS (irritable bowel syndrome), Insulin pump status, SIRS (systemic inflammatory response syndrome) ([email or web address] [facility] Thyroid disease, and Trigger finger. Past Surgical History:  has a past surgical history that includes pr  delivery only (x2); skin biopsy; Endoscopy, colon, diagnostic; Carpal tunnel release (); laparoscopy (); Finger trigger release; Finger trigger release (Right, 2016); Wound debridement (Right, 2017); and Finger trigger release (Right, 4/3/2019). Social History:  reports that she has never smoked. She has never used smokeless tobacco. She reports current alcohol use. She reports that she does not use drugs. Family History: family history includes Cancer in her mother; High Blood Pressure in her maternal grandmother. The patients home medications have been reviewed.     Allergies: Penicillins and Sulfa antibiotics    -------------------------------------------------- RESULTS -------------------------------------------------    LABS:  Results for orders placed or performed during the hospital encounter of 22   COVID-19, Rapid Specimen: Nasopharyngeal Swab   Result Value Ref Range    SARS-CoV-2, NAAT Not Detected Not Detected   Basic metabolic panel   Result Value Ref Range    Sodium 131 (L) 132 - 146 mmol/L    Potassium 4.9 3.5 - 5.0 mmol/L    Chloride 95 (L) 98 - 107 mmol/L    CO2 18 (L) 22 - 29 mmol/L    Anion Gap 18 (H) 7 - 16 mmol/L    Glucose 275 (H) 74 - 99 mg/dL    BUN 19 6 - 20 mg/dL    Creatinine 1.1 (H) 0.5 - 1.0 mg/dL    GFR Non-African American 54 >=60 mL/min/1.73    GFR African American >60     Calcium 9.7 8.6 - 10.2 mg/dL   CBC   Result Value Ref Range    WBC 14.8 (H) 4.5 - 11.5 E9/L    RBC 4.44 3.50 - 5.50 E12/L    Hemoglobin 14.0 11.5 - 15.5 g/dL    Hematocrit 41.1 34.0 - 48.0 %    MCV 92.6 80.0 - 99.9 fL    MCH 31.5 26.0 - 35.0 pg    MCHC 34.1 32.0 - 34.5 %    RDW 12.1 11.5 - 15.0 fL    Platelets 878 708 - 167 E9/L    MPV 9.3 7.0 - 12.0 fL   Urinalysis with Microscopic   Result Value Ref Range    Color, UA Yellow Straw/Yellow    Clarity, UA Clear Clear    Glucose, Ur >=1000 (A) Negative mg/dL    Bilirubin Urine Negative Negative    Ketones, Urine >=80 (A) Negative mg/dL    Specific Gravity, UA 1.010 1.005 - 1.030    Blood, Urine TRACE-INTACT Negative    pH, UA 6.0 5.0 - 9.0    Protein, UA Negative Negative mg/dL    Urobilinogen, Urine 1.0 <2.0 E.U./dL    Nitrite, Urine Negative Negative    Leukocyte Esterase, Urine Negative Negative    WBC, UA 1-3 0 - 5 /HPF    RBC, UA 0-1 0 - 2 /HPF    Bacteria, UA FEW (A) None Seen /HPF   Beta-Hydroxybutyrate   Result Value Ref Range    Beta-Hydroxybutyrate 2.05 (H) 0.02 - 0.27 mmol/L   pH, venous   Result Value Ref Range    pH, Cosme 7.30 (L) 7.35 - 7.45   TSH   Result Value Ref Range    TSH 9.530 (H) 0.270 - 4.200 uIU/mL   T4, Free   Result Value Ref Range    T4 Free 1.48 0.93 - 1.70 ng/dL   D-Dimer, Quantitative   Result Value Ref Range    D-Dimer, Quant <200 ng/mL DDU   SPECIMEN REJECTION   Result Value Ref Range    Rejected Test TRP5     Reason for Rejection see below    Troponin Result Value Ref Range    Troponin, High Sensitivity 8 0 - 9 ng/L   Basic Metabolic Panel   Result Value Ref Range    Sodium 136 132 - 146 mmol/L    Potassium 3.9 3.5 - 5.0 mmol/L    Chloride 103 98 - 107 mmol/L    CO2 23 22 - 29 mmol/L    Anion Gap 10 7 - 16 mmol/L    Glucose 89 74 - 99 mg/dL    BUN 16 6 - 20 mg/dL    Creatinine 0.9 0.5 - 1.0 mg/dL    GFR Non-African American >60 >=60 mL/min/1.73    GFR African American >60     Calcium 8.5 (L) 8.6 - 10.2 mg/dL   Magnesium   Result Value Ref Range    Magnesium 1.7 1.6 - 2.6 mg/dL   Phosphorus   Result Value Ref Range    Phosphorus 2.5 2.5 - 4.5 mg/dL   SPECIMEN REJECTION   Result Value Ref Range    Rejected Test K     Reason for Rejection see below    SPECIMEN REJECTION   Result Value Ref Range    Rejected Test TROP     Reason for Rejection see below    Basic Metabolic Panel w/ Reflex to MG   Result Value Ref Range    Sodium 137 132 - 146 mmol/L    Potassium reflex Magnesium 4.2 3.5 - 5.0 mmol/L    Chloride 106 98 - 107 mmol/L    CO2 20 (L) 22 - 29 mmol/L    Anion Gap 11 7 - 16 mmol/L    Glucose 149 (H) 74 - 99 mg/dL    BUN 18 6 - 20 mg/dL    Creatinine 1.0 0.5 - 1.0 mg/dL    GFR Non-African American >60 >=60 mL/min/1.73    GFR African American >60     Calcium 8.4 (L) 8.6 - 10.2 mg/dL   Beta-Hydroxybutyrate   Result Value Ref Range    Beta-Hydroxybutyrate 0.54 (H) 0.02 - 0.27 mmol/L   Hemoglobin A1c   Result Value Ref Range    Hemoglobin A1C 6.6 (H) 4.0 - 5.6 %   Troponin   Result Value Ref Range    Troponin, High Sensitivity 10 (H) 0 - 9 ng/L   POCT Glucose   Result Value Ref Range    Glucose 370 mg/dL    QC OK? y    POC Pregnancy Urine Qual   Result Value Ref Range    HCG, Urine, POC Negative Negative    Lot Number [id number]     Positive QC Pass/Fail Pass     Negative QC Pass/Fail Pass    POCT Glucose   Result Value Ref Range    Meter Glucose 173 (H) 74 - 99 mg/dL   POCT Glucose   Result Value Ref Range    Meter Glucose 166 (H) 74 - 99 mg/dL   POCT Glucose Result Value Ref Range    Meter Glucose 81 74 - 99 mg/dL   POCT Glucose   Result Value Ref Range    Meter Glucose 99 74 - 99 mg/dL   POCT Glucose   Result Value Ref Range    Meter Glucose 123 (H) 74 - 99 mg/dL       RADIOLOGY:  XR CHEST (2 VW)   Final Result   No acute process. ------------------------- NURSING NOTES AND VITALS REVIEWED ---------------------------  Date / Time Roomed:  8/13/2022  9:10 AM  ED Bed Assignment:  [id number]/[id number]    The nursing notes within the ED encounter and vital signs as below have been reviewed. Patient Vitals for the past 24 hrs:   BP Temp Temp src Pulse Resp SpO2 Height Weight   08/13/22 2000 (!) 146/80 98.4 °F (36.9 °C) Oral (!) 102 16 95 % -- --   08/13/22 1803 (!) 142/83 -- -- 93 20 94 % -- --   08/13/22 1714 (!) 141/75 -- -- 94 20 99 % -- --   08/13/22 1533 (!) 141/78 -- -- (!) 104 16 98 % -- --   08/13/22 1219 128/68 -- -- (!) 108 20 98 % 5' 4\" (1.626 m) 170 lb (77.1 kg)   08/13/22 1203 (!) 120/58 -- -- -- -- 91 % -- --   08/13/22 1140 -- -- -- -- -- -- -- 168 lb (76.2 kg)   08/13/22 1134 125/71 -- -- -- -- 92 % -- --   08/13/22 1119 139/64 -- -- -- -- 94 % -- --   08/13/22 1103 (!) 146/72 -- -- -- -- 94 % -- --   08/13/22 0833 -- 98.1 °F (36.7 °C) Oral -- -- -- -- --   08/13/22 0827 (!) 168/87 -- -- (!) 131 20 -- -- --   08/13/22 0823 -- -- -- (!) 129 -- 99 % -- --       Oxygen Saturation Interpretation: Normal    ------------------------------------------ PROGRESS NOTES ------------------------------------------  Re-evaluation(s):  Time: 11am Patients symptoms show no change  Repeat physical examination is not changed    Counseling:  I have spoken with the patient and discussed todays results, in addition to providing specific details for the plan of care and counseling regarding the diagnosis and prognosis.   Their questions are answered at this time and they are agreeable with the plan of admission.    --------------------------------- ADDITIONAL PROVIDER NOTES ---------------------------------  Consultations:  Spoke with MAJOR. Discussed case. They will admit the patient. This patient's ED course included: a personal history and physicial examination, re-evaluation prior to disposition, multiple bedside re-evaluations, IV medications, and cardiac monitoring    This patient has remained hemodynamically stable during their ED course. Please note that the withdrawal or failure to initiate urgent interventions for this patient would likely result in a life threatening deterioration or permanent disability. Accordingly this patient received 30 minutes of critical care time, excluding separately billable procedures. Diagnosis:  1. Type 1 diabetes mellitus with ketoacidosis without coma (HCC)    2. Chest pain, unspecified type        Disposition:  Patient's disposition: Admit to telemetry  Patient's condition is stable.          Andie To MD  08/13/22 2014

## 2022-08-14 PROBLEM — E10.10 TYPE 1 DIABETES MELLITUS WITH KETOACIDOSIS WITHOUT COMA (HCC): Status: ACTIVE | Noted: 2022-08-14

## 2022-08-14 PROBLEM — E78.00 PURE HYPERCHOLESTEROLEMIA: Status: ACTIVE | Noted: 2022-08-14

## 2022-08-14 PROBLEM — R07.89 ATYPICAL CHEST PAIN: Status: ACTIVE | Noted: 2022-08-14

## 2022-08-14 PROBLEM — R07.9 CHEST PAIN: Status: ACTIVE | Noted: 2022-08-14

## 2022-08-14 PROBLEM — E10.40 TYPE 1 DIABETES MELLITUS WITH DIABETIC NEUROPATHY (HCC): Status: ACTIVE | Noted: 2022-08-14

## 2022-08-14 PROBLEM — I10 PRIMARY HYPERTENSION: Status: ACTIVE | Noted: 2022-08-14

## 2022-08-14 LAB
ANION GAP SERPL CALCULATED.3IONS-SCNC: 12 MMOL/L (ref 7–16)
ANION GAP SERPL CALCULATED.3IONS-SCNC: 19 MMOL/L (ref 7–16)
BASOPHILS ABSOLUTE: 0.07 E9/L (ref 0–0.2)
BASOPHILS RELATIVE PERCENT: 0.9 % (ref 0–2)
BETA-HYDROXYBUTYRATE: 2.96 MMOL/L (ref 0.02–0.27)
BUN BLDV-MCNC: 14 MG/DL (ref 6–20)
BUN BLDV-MCNC: 17 MG/DL (ref 6–20)
CALCIUM SERPL-MCNC: 8.4 MG/DL (ref 8.6–10.2)
CALCIUM SERPL-MCNC: 8.8 MG/DL (ref 8.6–10.2)
CHLORIDE BLD-SCNC: 100 MMOL/L (ref 98–107)
CHLORIDE BLD-SCNC: 105 MMOL/L (ref 98–107)
CO2: 15 MMOL/L (ref 22–29)
CO2: 17 MMOL/L (ref 22–29)
CREAT SERPL-MCNC: 1 MG/DL (ref 0.5–1)
CREAT SERPL-MCNC: 1.1 MG/DL (ref 0.5–1)
EOSINOPHILS ABSOLUTE: 0.16 E9/L (ref 0.05–0.5)
EOSINOPHILS RELATIVE PERCENT: 2 % (ref 0–6)
GFR AFRICAN AMERICAN: >60
GFR AFRICAN AMERICAN: >60
GFR NON-AFRICAN AMERICAN: 54 ML/MIN/1.73
GFR NON-AFRICAN AMERICAN: >60 ML/MIN/1.73
GLUCOSE BLD-MCNC: 182 MG/DL (ref 74–99)
GLUCOSE BLD-MCNC: 304 MG/DL (ref 74–99)
HBA1C MFR BLD: 6.6 % (ref 4–5.6)
HCT VFR BLD CALC: 34.9 % (ref 34–48)
HEMOGLOBIN: 12.2 G/DL (ref 11.5–15.5)
IMMATURE GRANULOCYTES #: 0.03 E9/L
IMMATURE GRANULOCYTES %: 0.4 % (ref 0–5)
LYMPHOCYTES ABSOLUTE: 1.85 E9/L (ref 1.5–4)
LYMPHOCYTES RELATIVE PERCENT: 22.9 % (ref 20–42)
MCH RBC QN AUTO: 31.9 PG (ref 26–35)
MCHC RBC AUTO-ENTMCNC: 35 % (ref 32–34.5)
MCV RBC AUTO: 91.1 FL (ref 80–99.9)
METER GLUCOSE: 249 MG/DL (ref 74–99)
MONOCYTES ABSOLUTE: 0.48 E9/L (ref 0.1–0.95)
MONOCYTES RELATIVE PERCENT: 5.9 % (ref 2–12)
NEUTROPHILS ABSOLUTE: 5.5 E9/L (ref 1.8–7.3)
NEUTROPHILS RELATIVE PERCENT: 67.9 % (ref 43–80)
PDW BLD-RTO: 12 FL (ref 11.5–15)
PLATELET # BLD: 252 E9/L (ref 130–450)
PMV BLD AUTO: 9.3 FL (ref 7–12)
POTASSIUM SERPL-SCNC: 4 MMOL/L (ref 3.5–5)
POTASSIUM SERPL-SCNC: 4.1 MMOL/L (ref 3.5–5)
RBC # BLD: 3.83 E12/L (ref 3.5–5.5)
SODIUM BLD-SCNC: 134 MMOL/L (ref 132–146)
SODIUM BLD-SCNC: 134 MMOL/L (ref 132–146)
TROPONIN, HIGH SENSITIVITY: 12 NG/L (ref 0–9)
WBC # BLD: 8.1 E9/L (ref 4.5–11.5)

## 2022-08-14 PROCEDURE — 84484 ASSAY OF TROPONIN QUANT: CPT

## 2022-08-14 PROCEDURE — 6360000002 HC RX W HCPCS: Performed by: INTERNAL MEDICINE

## 2022-08-14 PROCEDURE — 6370000000 HC RX 637 (ALT 250 FOR IP): Performed by: INTERNAL MEDICINE

## 2022-08-14 PROCEDURE — 99222 1ST HOSP IP/OBS MODERATE 55: CPT | Performed by: INTERNAL MEDICINE

## 2022-08-14 PROCEDURE — 85025 COMPLETE CBC W/AUTO DIFF WBC: CPT

## 2022-08-14 PROCEDURE — 93005 ELECTROCARDIOGRAM TRACING: CPT | Performed by: INTERNAL MEDICINE

## 2022-08-14 PROCEDURE — APPSS60 APP SPLIT SHARED TIME 46-60 MINUTES: Performed by: NURSE PRACTITIONER

## 2022-08-14 PROCEDURE — 6370000000 HC RX 637 (ALT 250 FOR IP): Performed by: NURSE PRACTITIONER

## 2022-08-14 PROCEDURE — 36415 COLL VENOUS BLD VENIPUNCTURE: CPT

## 2022-08-14 PROCEDURE — 2060000000 HC ICU INTERMEDIATE R&B

## 2022-08-14 PROCEDURE — 82962 GLUCOSE BLOOD TEST: CPT

## 2022-08-14 PROCEDURE — 6370000000 HC RX 637 (ALT 250 FOR IP): Performed by: PHYSICIAN ASSISTANT

## 2022-08-14 PROCEDURE — 82010 KETONE BODYS QUAN: CPT

## 2022-08-14 PROCEDURE — 80048 BASIC METABOLIC PNL TOTAL CA: CPT

## 2022-08-14 PROCEDURE — 83036 HEMOGLOBIN GLYCOSYLATED A1C: CPT

## 2022-08-14 PROCEDURE — 2580000003 HC RX 258: Performed by: INTERNAL MEDICINE

## 2022-08-14 RX ORDER — SODIUM CHLORIDE 9 MG/ML
INJECTION, SOLUTION INTRAVENOUS CONTINUOUS
Status: DISCONTINUED | OUTPATIENT
Start: 2022-08-14 | End: 2022-08-14 | Stop reason: SDUPTHER

## 2022-08-14 RX ORDER — KETOROLAC TROMETHAMINE 30 MG/ML
15 INJECTION, SOLUTION INTRAMUSCULAR; INTRAVENOUS EVERY 6 HOURS PRN
Status: DISCONTINUED | OUTPATIENT
Start: 2022-08-14 | End: 2022-08-15 | Stop reason: HOSPADM

## 2022-08-14 RX ORDER — SODIUM CHLORIDE 9 MG/ML
INJECTION, SOLUTION INTRAVENOUS CONTINUOUS
Status: DISCONTINUED | OUTPATIENT
Start: 2022-08-14 | End: 2022-08-14

## 2022-08-14 RX ORDER — ATORVASTATIN CALCIUM 40 MG/1
40 TABLET, FILM COATED ORAL NIGHTLY
Status: DISCONTINUED | OUTPATIENT
Start: 2022-08-14 | End: 2022-08-15 | Stop reason: HOSPADM

## 2022-08-14 RX ADMIN — HYDROXYCHLOROQUINE SULFATE 200 MG: 200 TABLET ORAL at 09:30

## 2022-08-14 RX ADMIN — MELOXICAM 7.5 MG: 7.5 TABLET ORAL at 20:30

## 2022-08-14 RX ADMIN — PANTOPRAZOLE SODIUM 40 MG: 40 TABLET, DELAYED RELEASE ORAL at 06:13

## 2022-08-14 RX ADMIN — SODIUM CHLORIDE: 9 INJECTION, SOLUTION INTRAVENOUS at 11:10

## 2022-08-14 RX ADMIN — INSULIN LISPRO 3 UNITS: 100 INJECTION, SOLUTION INTRAVENOUS; SUBCUTANEOUS at 06:18

## 2022-08-14 RX ADMIN — SODIUM CHLORIDE: 9 INJECTION, SOLUTION INTRAVENOUS at 11:31

## 2022-08-14 RX ADMIN — GABAPENTIN 300 MG: 300 CAPSULE ORAL at 20:30

## 2022-08-14 RX ADMIN — ARIPIPRAZOLE 2 MG: 2 TABLET ORAL at 09:32

## 2022-08-14 RX ADMIN — TRAZODONE HYDROCHLORIDE 100 MG: 50 TABLET ORAL at 20:30

## 2022-08-14 RX ADMIN — LEVOTHYROXINE SODIUM 88 MCG: 0.09 TABLET ORAL at 06:13

## 2022-08-14 RX ADMIN — INSULIN LISPRO 1 UNITS: 100 INJECTION, SOLUTION INTRAVENOUS; SUBCUTANEOUS at 12:02

## 2022-08-14 RX ADMIN — Medication 10 UNITS: at 11:30

## 2022-08-14 RX ADMIN — DULOXETINE HYDROCHLORIDE 60 MG: 60 CAPSULE, DELAYED RELEASE ORAL at 20:29

## 2022-08-14 RX ADMIN — HYDROXYCHLOROQUINE SULFATE 200 MG: 200 TABLET ORAL at 20:31

## 2022-08-14 RX ADMIN — BUSPIRONE HYDROCHLORIDE 15 MG: 10 TABLET ORAL at 09:32

## 2022-08-14 RX ADMIN — DULOXETINE HYDROCHLORIDE 60 MG: 60 CAPSULE, DELAYED RELEASE ORAL at 09:32

## 2022-08-14 RX ADMIN — ATORVASTATIN CALCIUM 40 MG: 40 TABLET, FILM COATED ORAL at 20:30

## 2022-08-14 RX ADMIN — KETOROLAC TROMETHAMINE 15 MG: 30 INJECTION, SOLUTION INTRAMUSCULAR at 11:40

## 2022-08-14 RX ADMIN — BUSPIRONE HYDROCHLORIDE 15 MG: 10 TABLET ORAL at 20:30

## 2022-08-14 RX ADMIN — LOSARTAN POTASSIUM 50 MG: 25 TABLET, FILM COATED ORAL at 09:34

## 2022-08-14 RX ADMIN — MELOXICAM 7.5 MG: 7.5 TABLET ORAL at 09:34

## 2022-08-14 ASSESSMENT — PAIN DESCRIPTION - DIRECTION: RADIATING_TOWARDS: L SHOULDER

## 2022-08-14 ASSESSMENT — PAIN DESCRIPTION - DESCRIPTORS: DESCRIPTORS: ACHING

## 2022-08-14 ASSESSMENT — PAIN DESCRIPTION - LOCATION
LOCATION: CHEST

## 2022-08-14 ASSESSMENT — PAIN SCALES - GENERAL
PAINLEVEL_OUTOF10: 2
PAINLEVEL_OUTOF10: 0
PAINLEVEL_OUTOF10: 5
PAINLEVEL_OUTOF10: 5
PAINLEVEL_OUTOF10: 2

## 2022-08-14 NOTE — PROGRESS NOTES
Pt having another bout of angina, going up into L shoulder, also having nausea. BG is 249, message left for Dr. Rossana Cai. Dr. Rossana Cai came to bedside, pt said her blood sugar had been 300 before I checked in and she used her insulin pump to give herself 3 units, Dr. Rossana Cai is aware and said that ok for now but she will also order 10 units regular insulin subcutaneous, start normal saline at 150, and consult cardiology and endocrinology. Perfect serve message send to Marcell Woods NP of cardiology.

## 2022-08-14 NOTE — PROGRESS NOTES
Pt BG at 1530 is 224, bmp and troponin collected and sent to lab. Dr. Melody Sinha notified of BMP results, he stated he was alright with the current numbers and ordered IV fluids be d/cd. He also d/c subcutaneous insulin and said pt is to give herself a bolus of insulin from her pump at mealtimes and at bedtime, I informed pt of that and she understands what to do.

## 2022-08-14 NOTE — PROGRESS NOTES
Pt had some mild aching angina this am, she felt her heart was pounding a little. BG is 316- previous shift rn gave am sliding scale dose of 3 units insulin. BMP and troponin was drawn. EKG completed, transmitted, and placed in chart. Appears regular rhythm sinus tach at rate of 104. Pt states she feels better at this time, no feeling of pounding in her chest, no dizziness, and no lightheadedness. Previous RN tried to get a hold of attending physician Dr. Jennifer Alvarez but there was a problem with perfect serve phone numbers she did get a hold of Dr. Vasquez Rain who said she will pass info to Dr. Jennifer Alvarez until our system is repaired w/correct contact numbers.

## 2022-08-14 NOTE — PROGRESS NOTES
Pt has internal glucose monitor and insulin pump. To save her some finger sticks I am tracking her glucose hourly with her machine. 1230 BG was 279, at 1330 it was 191, at 1430 it is 145. Will use our glucometer at times for results to go into epic. Pt is currently using her insulin pump with parameters set by Dr. Justin Boss. Will repeat BMP at 1600.

## 2022-08-14 NOTE — CONSULTS
Inpatient Cardiology Consultation      Reason for Consult:  Chest Pain    Consulting Physician: Dr López Braswell    Requesting Physician:  Dr Jerome Smith    Date of Consultation: 8/14/2022    HISTORY OF PRESENT ILLNESS: 37 yo  female known to Dr Edelmira Aguilar (will obtain records from [facility] for [address] [facility] report)    PMH: T1DM  with insulin pump with neuropathy/gastroparesis, HTN, hypothyroidism on HRT, daily Mobic, anxiety/depression, GERD, IBS, and on chronic Plaquenil for connective tissue disorder. 8/13/2022 Mid-sternal CP (10 out of 10) radiating to bilateral shoulder worse on L and into back starting around 0700 and by 0800 got unbearable. Heart was pounding. No SOB, dizziness, diaphoresis. While in ED [facility] Nitroglycerin x 1--> resolved --> then returned last night  but only in chest 1 out 10    [facility] 8/13/2022 /87 -130's ST, afebrile, and O2 saturation 99% on RA  Bun/Cr 18/1.0, K+ 4.2, troponin 8-->10, magnesium 1.7,   Beta-Hydroxybutrate 2.05-->2.96    Around 0630 CP started again--> eased up and went away--> then around lunchtime came back got worse and then threw--> Toradol/IVF's    Currently /70 -110's ST, remains afebrile, and O2 saturation 95% on RA   Troponin this am 12., Bun/Cr 17/1.1, K+ 4.1  Currently CP free      Please note: past medical records were reviewed per electronic medical record (EMR) - see detailed reports under Past Medical/ Surgical History. Past Medical History:    Lifelong non smoker  T1DM insulin requiring (pump) with neuropathy. Hx DKA in 1/2016. HgbA1c 6.6 on 8/14/2022  HTN  Hypothyroidism on HRT TSH 9.530 Free T4 1.48 on 8/13/2022  Anxiety/Depression  Daily use of Mobic chronic pain   Connective tissue disorder on Plaquenil  IBS  GERD  Carpal tunnel s/p R carpal tunnel release  2016 TTE Dr Maddy To: EF >55%. Sinus tachycardia. Normal RV size and function.  Mild AI.   2021 Treadmill MPS ( for SOB): Abnormal per patient  2021 [facility]HS: Normal coronaries per patient   Placed on statin After [facility]    Past Surgical History:  R carpal tunnel release, EGD, R trigger finger release, Exploratory laparotomy, , R lateral epicondylar debridement in 2017       Medications Prior to admit:  Prior to Admission medications    Medication Sig Start Date End Date Taking? Authorizing Provider   meloxicam (MOBIC) 7.5 MG tablet Take 7.5 mg by mouth in the morning and 7.5 mg before bedtime. Yes Historical Provider, MD   busPIRone (BUSPAR) 15 MG tablet Take 15 mg by mouth in the morning and 15 mg before bedtime. Yes Historical Provider, MD   traZODone (DESYREL) 100 MG tablet Take 100 mg by mouth nightly   Yes Historical Provider, MD   Insulin Disposable Pump (OMNIPOD 5 G6 POD, GEN 5,) MISC Change pod every 72 hours 8/3/22   JOSEPH Gallardo NP   Insulin Disposable Pump (OMNIPOD 5 G6 INTRO, GEN 5,) KIT Use to infuse insulin 8/3/22   JOSEPH Gallardo NP   hydroxychloroquine (PLAQUENIL) 200 MG tablet Take 200 mg by mouth 2 times daily    Historical Provider, MD   insulin aspart (NOVOLOG) 100 UNIT/ML injection vial USE VIA INSULIN PUMP.  UNITS PER DAY. 3/24/22   Oscar Story MD   levothyroxine (SYNTHROID) 88 MCG tablet Take 1 tablet by mouth Daily 3/24/22   Oscar Story MD   [facility] ULTRA strip CHECK BLOOD SUGAR FOUR TIMES DAILY 22   Oscar Story MD   Levonorgestrel (LILETTA, 52 MG,) IUD 52 mg 1 each by IntraUTERine route once Inserted 2022    Historical Provider, MD   Insulin Disposable Pump (OMNIPOD DASH 5 PACK PODS) MISC USE TO CONTINUOUSLY        ADMINISTER INSULIN AND     CHANGE EVERY 3 DAYS 1/3/22   Oscar Story MD   esomeprazole Magnesium (NEXIUM) 20 MG PACK Take 40 mg by mouth in the morning. Historical Provider, MD   lidocaine viscous hcl (XYLOCAINE) 2 % SOLN solution Use small amount vaginally as needed for pain.  21   [facility]   Insulin Disposable Pump (OMNIPOD DASH SYSTEM) KIT To continuously administer inuslin 6/17/20   Ariel Martinez MD   ARIPiprazole (ABILIFY) 2 MG tablet Take 2 mg by mouth daily    Historical Provider, MD   Cholecalciferol (VITAMIN D3) 2000 UNITS CAPS Take 25,000 Units by mouth once a week wed    Historical Provider, MD   gabapentin (NEURONTIN) 100 MG capsule Take 300 mg by mouth nightly.  Takes nightly    Historical Provider, MD   losartan (COZAAR) 50 MG tablet Take 50 mg by mouth daily 2/20/16   Historical Provider, MD   DULoxetine (CYMBALTA) 30 MG capsule Take 60 mg by mouth 2 times daily     Historical Provider, MD       Current Medications:    Current Facility-Administered Medications: 0.9 % sodium chloride infusion, , IntraVENous, Continuous  ketorolac (TORADOL) injection 15 mg, 15 mg, IntraVENous, Q6H PRN  dextrose bolus 10% 125 mL, 125 mL, IntraVENous, PRN **OR** dextrose bolus 10% 250 mL, 250 mL, IntraVENous, PRN  potassium chloride 10 mEq/100 mL IVPB (Peripheral Line), 10 mEq, IntraVENous, PRN  sodium phosphate 10 mmol in sodium chloride 0.9 % 250 mL IVPB, 10 mmol, IntraVENous, PRN **OR** sodium phosphate 15 mmol in dextrose 5 % 250 mL IVPB, 15 mmol, IntraVENous, PRN **OR** sodium phosphate 20 mmol in dextrose 5 % 500 mL IVPB, 20 mmol, IntraVENous, PRN  ARIPiprazole (ABILIFY) tablet 2 mg, 2 mg, Oral, Daily  DULoxetine (CYMBALTA) extended release capsule 60 mg, 60 mg, Oral, BID  gabapentin (NEURONTIN) capsule 300 mg, 300 mg, Oral, Nightly  hydroxychloroquine (PLAQUENIL) tablet 200 mg, 200 mg, Oral, BID  levothyroxine (SYNTHROID) tablet 88 mcg, 88 mcg, Oral, Daily  losartan (COZAAR) tablet 50 mg, 50 mg, Oral, Daily  dextrose bolus 10% 125 mL, 125 mL, IntraVENous, PRN **OR** dextrose bolus 10% 250 mL, 250 mL, IntraVENous, PRN  potassium chloride 10 mEq/100 mL IVPB (Peripheral Line), 10 mEq, IntraVENous, PRN  magnesium sulfate 1000 mg in dextrose 5% 100 mL IVPB, 1,000 mg, IntraVENous, PRN  polyethylene glycol (GLYCOLAX) packet 17 g, 17 g, Oral, Daily PRN  enoxaparin (LOVENOX) injection 40 mg, 40 mg, SubCUTAneous, Daily  insulin lispro (HUMALOG) injection vial 0-4 Units, 0-4 Units, SubCUTAneous, TID WC  insulin lispro (HUMALOG) injection vial 0-4 Units, 0-4 Units, SubCUTAneous, Nightly  busPIRone (BUSPAR) tablet 15 mg, 15 mg, Oral, BID  meloxicam (MOBIC) tablet 7.5 mg, 7.5 mg, Oral, BID  traZODone (DESYREL) tablet 100 mg, 100 mg, Oral, Nightly  pantoprazole (PROTONIX) tablet 40 mg, 40 mg, Oral, QAM AC    Allergies:  Penicillins and Sulfa antibiotics    Social History:    Lifelong non smoker  Denies ETOH/Illicit Drugs  Activity: Lives  2 story home. + Drive. Employed @ [employer] (). Does not exercise. Code Status: full Code        Family History: No family hx of early CAD or SCD      REVIEW OF SYSTEMS:   See HPI    PHYSICAL EXAM:   BP (!) 146/70   Pulse (!) 106   Temp 98.3 °F (36.8 °C) (Oral)   Resp 16   Ht 5' 4\" (1.626 m)   Wt 170 lb (77.1 kg)   SpO2 95%   BMI 29.18 kg/m²   CONST:  Well developed,  female who appears of stated age. Awake, alert and cooperative. No apparent distress. HEENT:   Head- Normocephalic, atraumatic   Eyes- Conjunctivae pink, anicteric  Throat- Oral mucosa pink and moist  Neck-  No stridor, trachea midline, no jugular venous distention. No carotid bruit. CHEST: Chest symmetrical and non-tender to palpation. No accessory muscle use or intercostal retractions  RESPIRATORY: Lung sounds - clear throughout fields   CARDIOVASCULAR:     Heart Ausculation- Regular rate and rhythm, no murmur heard. PV: No lower extremity edema. No varicosities. Pedal pulses palpable, no clubbing or cyanosis   ABDOMEN: Soft, non-tender to light palpation. Bowel sounds present. No palpable masses; no abdominal bruit  MS: Good muscle strength and tone. No atrophy or abnormal movements. : Deferred  SKIN: Warm and dry no statis dermatitis or ulcers   NEURO / PSYCH: Oriented to person, place and time.  Speech clear and appropriate. Follows all commands. Pleasant affect     DATA:    ECG as per Dr Jeff Nichols interpretation  Tele strips: -110's    Diagnostic:    CXR  8/13/2022: no infiltrate or CHF      Intake/Output Summary (Last 24 hours) at 8/14/2022 1219  Last data filed at 8/13/2022 1712  Gross per 24 hour   Intake 298.94 ml   Output --   Net 298.94 ml       Labs:   CBC:   Recent Labs     08/13/22  0928 08/14/22  0457   WBC 14.8* 8.1   HGB 14.0 12.2   HCT 41.1 34.9    252     BMP:   Recent Labs     08/13/22  1529 08/14/22  0714    134   K 3.9 4.1   CO2 23 15*   BUN 16 17   CREATININE 0.9 1.1*   LABGLOM >60 54   CALCIUM 8.5* 8.8     Mag:   Recent Labs     08/13/22  1529   MG 1.7     Phos:   Recent Labs     08/13/22  1529   PHOS 2.5     TFT:   Lab Results   Component Value Date    TSH 9.530 (H) 08/13/2022    Q0CILLK 103.80 12/03/2015    FT3 2.8 12/03/2015    T4FREE 1.48 08/13/2022      HgA1c:   Lab Results   Component Value Date    LABA1C 6.6 (H) 08/14/2022     CARDIAC ENZYMES:  Recent Labs     08/13/22  1135 08/13/22  1312 08/14/22  0714   TROPHS 8 10* 12*     FASTING LIPID PANEL:  Lab Results   Component Value Date/Time    CHOL 171 04/07/2021 07:47 AM    HDL 51 04/07/2021 07:47 AM    LDLCALC 96 04/07/2021 07:47 AM    TRIG 120 04/07/2021 07:47 AM   A&P per Dr Fang Alba  Electronically signed by Demetrice Dumont. JAMES Murphy on 8/14/2022 at 12:19 PM      The above documentation has been prepared under my direction and personally reviewed by me in its entirety. I confirm that the note above accurately reflects all work, treatment, procedures, and medical decision making performed by me. The patient's history was independently obtained. The patient was independently examined. Electrocardiogram, prior and present cardiovascular assessment, and laboratory studies were reviewed. The patient a 50-year-old white female with no active association to [facility] and most recent cardiovascular care by Taz Zazueta.   She has an apparent longstanding history of chest discomfort with an apparent abnormal myocardial perfusion imaging study within the past year and subsequent coronary angiography at [facility] with the report presently unavailable for review but according to the patient no evidence of coronary atherosclerosis was noted. She additionally has a history of type 1 diabetes with an associated insulin pump and neuropathy with prior diabetic ketoacidosis and present appropriate control in addition to that of hypertension and hyperlipidemia. She is normally active with no active cardiovascular symptoms. On the day of hospitalization following awakening she noted retrosternal and precordial chest discomfort of both an aching and pressure-like nature radiating to her upper scapular regions bilaterally with mild associated dyspnea and nausea in addition to that of palpitations. Symptoms lasted in excess of 1 hour prior to presentation to the emergency room where a resting electrocardiogram reviewed at time of her evaluation demonstrated evidence of sinus tachycardia was otherwise unremarkable as well as a chest x-ray demonstrating no evidence of cardiomegaly or infiltrate. Chest x-ray again reviewed demonstrated no evidence of cardiomegaly or infiltrate. Minimal elevation of high-sensitivity troponin levels were noted and nitroglycerin was administered without a response and with subsequent spontaneous relief of her discomfort approximately 30 minutes following its administration. Subsequently related return of discomfort later that day as well as on the morning of present evaluation with episodes lasting approximately 1 hour with similar descriptors and additional electrocardiographic tracings again demonstrating evidence of sinus rhythm/sinus tachycardia and otherwise unremarkable upon review with additional cardiac biomarkers not consistent with that of an acute coronary syndrome.   She denied any additional manifestations of decompensated left ventricular systolic dysfunction or volume overload nor arrhythmia related manifestations. At the time of evaluation, her medications and allergies were reviewed as well as that of her past medical history and review of systems as documented. On examination, she is presently experiencing no active symptoms and appears comfortable and is hemodynamically stable with vital signs as documented. Jugular venous pressure is normal with no identified carotid bruits. Lung fields are clear to auscultation. Cardiac examination is normal for a regular rate and rhythm with no gallop rhythm or cardiac murmur. A benign abdominal examination is present no peripheral edema noted. Diagnostic Assessment and Plan: On a clinical basis, the patient appears compensated from a cardiovascular standpoint with her present chest discomfort of a noncardiac origin. This is been extensively discussed with she and her  with no additional cardiovascular assessment indicated particular in the face of her prior coronary angiography as well as the absence of objective assessment findings suggestive of present cardiovascular pathology. In addition to time of evaluation a discussion was held regarding needs of aggressive risk factor modification of blood pressure, diabetes and serum lipids as well as the indications for the continuation of her existing high-dose atorvastatin to assist in ongoing reduction of future atherosclerotic development. In review of medications, she is maintained on daily administered nonsteroidal anti-inflammatory therapy somewhat increasing her risk of further adverse cardiovascular effects and if possible an alternative to provide symptom relief would be advisable. We will further evaluate her should additional cardiovascular difficulties or concerns arise. Thank you for allowing me to participate in your patient's care.  Please feel free to contact

## 2022-08-14 NOTE — PLAN OF CARE
Problem: Discharge Planning  Goal: Discharge to home or other facility with appropriate resources  Recent Flowsheet Documentation  Taken 8/14/2022 0036 by Topher James RN  Discharge to home or other facility with appropriate resources:   Identify barriers to discharge with patient and caregiver   Arrange for needed discharge resources and transportation as appropriate   Identify discharge learning needs (meds, wound care, etc)   Arrange for interpreters to assist at discharge as needed

## 2022-08-14 NOTE — H&P
[facility]   History and Physical      CHIEF COMPLAINT: Nausea vomiting generalized illness    Reason for Admission: Diabetic ketoacidosis    History Obtained From:  patient, spouse    HISTORY OF PRESENT ILLNESS:      The patient is a 36 y.o. female of [facility] with significant past medical history of insulin-dependent diabetes mellitus type 2, on insulin pump who presents with complaints of generalized weakness fatigue nausea vomiting and elevated blood sugars. Emergency department course reveals elevated anion gap with hyperglycemia. Patient subsequently transferred to telemetry floor for evaluation and treatment of DKA. She complains of chest heaviness pressure and tightness across her chest going into intrascapular area and shoulder blades. On my evaluation heart rate is in the 130 range. She admits to feeling sick to her stomach and extremely nauseated. Anion gap had resolved earlier in the morning and last night, however most recent BMP demonstrates anion gap is increased back up to 18 with beta hydroxybutyrate of close to 3. Of note patient has had a left heart cath in the past secondary to complaints of chest heaviness and discomfort that have been persistent. Heart cath was clean.           All labs personally reviewed   All imaging personally reviewed     Past Medical History:        Diagnosis Date    Anxiety     Depression     Diabetes mellitus type 1, controlled, without complications ([facility] [address] 4/8/9309    Epicondylitis, lateral 02/2017    right    GERD (gastroesophageal reflux disease)     Hypertension     IBS (irritable bowel syndrome)     Insulin pump status has insulin pump    follows with Dr. Oleg Camarena    SIRS (systemic inflammatory response syndrome) ([facility] [address] 1/8/2016    Thyroid disease     Trigger finger     right index and little finger     Past Surgical History:        Procedure Laterality Date    CARPAL TUNNEL RELEASE  2011    left hand     DEBRIDEMENT Right 2017    Right lateral epicondylar debridement    ENDOSCOPY, COLON, DIAGNOSTIC      FINGER TRIGGER RELEASE      middle finger bilat, and right thumb  / multiple  / last one 2017    [address] Right 2016    4th finger    FINGER TRIGGER RELEASE Right 4/3/2019    TRIGGER RELEASE RIGHT INDEX AND SMALL FINGER performed by Caden Leal MD at [address] [place] [address]  2009    exploratory     MO  DELIVERY ONLY  x2    , Low Cervical    SKIN BIOPSY           Medications Prior to Admission:    Medications Prior to Admission: meloxicam (MOBIC) 7.5 MG tablet, Take 7.5 mg by mouth in the morning and 7.5 mg before bedtime. busPIRone (BUSPAR) 15 MG tablet, Take 15 mg by mouth in the morning and 15 mg before bedtime. traZODone (DESYREL) 100 MG tablet, Take 100 mg by mouth nightly  Insulin Disposable Pump (OMNIPOD 5 G6 POD, GEN 5,) MISC, Change pod every 72 hours  Insulin Disposable Pump (OMNIPOD 5 G6 INTRO, GEN 5,) KIT, Use to infuse insulin  hydroxychloroquine (PLAQUENIL) 200 MG tablet, Take 200 mg by mouth 2 times daily  insulin aspart (NOVOLOG) 100 UNIT/ML injection vial, USE VIA INSULIN PUMP.  UNITS PER DAY. levothyroxine (SYNTHROID) 88 MCG tablet, Take 1 tablet by mouth Daily  ONETOUCH ULTRA strip, CHECK BLOOD SUGAR FOUR TIMES DAILY  Levonorgestrel (LILETTA, 52 MG,) IUD 52 mg, 1 each by IntraUTERine route once Inserted 2022  Insulin Disposable Pump (OMNIPOD DASH 5 PACK PODS) MISC, USE TO CONTINUOUSLY        ADMINISTER INSULIN AND     CHANGE EVERY 3 DAYS  esomeprazole Magnesium (NEXIUM) 20 MG PACK, Take 40 mg by mouth in the morning. lidocaine viscous hcl (XYLOCAINE) 2 % SOLN solution, Use small amount vaginally as needed for pain.   Insulin Disposable Pump (OMNIPOD DASH SYSTEM) KIT, To continuously administer inuslin  ARIPiprazole (ABILIFY) 2 MG tablet, Take 2 mg by mouth daily  Cholecalciferol (VITAMIN D3) 2000 UNITS CAPS, Take 25,000 Units by mouth once a week wed  gabapentin (NEURONTIN) 100 MG capsule, Take 300 mg by mouth nightly. Takes nightly  losartan (COZAAR) 50 MG tablet, Take 50 mg by mouth daily  DULoxetine (CYMBALTA) 30 MG capsule, Take 60 mg by mouth 2 times daily     Allergies:  Penicillins and Sulfa antibiotics    Social History:   TOBACCO:   reports that she has never smoked. She has never used smokeless tobacco.  ETOH:   reports current alcohol use. MARITAL STATUS:    OCCUPATION:      Family History:       Problem Relation Age of Onset    Cancer Mother     High Blood Pressure Maternal Grandmother        REVIEW OF SYSTEMS:    General ROS:  chest pain, palpitations, nausea vomiting generalized weakness and fatigue  Hematological and Lymphatic ROS: negative  Endocrine ROS: negative  Respiratory ROS: no cough, shortness of breath, or wheezing  Cardiovascular ROS: no chest pain or dyspnea on exertion  Gastrointestinal ROS: no abdominal pain, change in bowel habits, or black or bloody stools  Genito-Urinary ROS: no dysuria, trouble voiding, or hematuria  Neurological ROS: no TIA or stroke symptoms  negative    Vitals:  BP (!) 146/70   Pulse (!) 106   Temp 98.3 °F (36.8 °C) (Oral)   Resp 16   Ht 5' 4\" (1.626 m)   Wt 170 lb (77.1 kg)   SpO2 95%   BMI 29.18 kg/m²     PHYSICAL EXAM:  General:  Awake, alert, oriented X 3. Well developed, well nourished, well groomed. No apparent distress. HEENT:  Normocephalic, atraumatic. Pupils equal, round, reactive to light. No scleral icterus. No conjunctival injection. Normal lips, teeth, and gums. No nasal discharge. Neck:  Supple, FROM  Heart:  RRR, no murmurs, gallops, rubs, carotid upstroke normal, no carotid bruits  Lungs:  CTA bilaterally, bilat symmetrical expansion, no wheeze, rales, or rhonchi  Abdomen:   Bowel sounds present, soft, nontender, no masses, no organomegaly, no peritoneal signs  Extremities:  No clubbing, cyanosis, or edema  Skin:  Warm and dry, no open lesions or rash  Neuro:  Cranial nerves 2-12 intact, no focal deficits  Vascular: Radial and pedal Pulses 2+  Breast: deferred  Rectal: deferred  Genitalia:  deferred      DATA:     Recent Labs     08/13/22  0928 08/14/22  0457   WBC 14.8* 8.1   HGB 14.0 12.2    252     Recent Labs     08/13/22  1312 08/13/22  1529 08/14/22  0714    136 134   K 4.2 3.9 4.1   BUN 18 16 17   CREATININE 1.0 0.9 1.1*     No results for input(s): PROT, INR in the last 72 hours. No results for input(s): AST, ALT, ALKPHOS, BILIDIR, BILITOT in the last 72 hours. No results for input(s): BNP in the last 72 hours. No results for input(s): CKTOTAL, CKMB, CKMBINDEX, TROPONINI in the last 72 hours. ASSESSMENT:      Principal Problem:    Diabetic ketoacidosis associated with diabetes mellitus due to underlying condition ([facility] [address]  Active Problems:    DKA, type 1, not at goal [facility])  Resolved Problems:    * No resolved hospital problems.  *        PLAN:    79-year-old  woman admitted for evaluation of diabetic ketoacidosis generalized fatigue and weakness, chest heaviness with sinus tachycardia    Aggressive IV fluid hydration with normal saline at 150 cc an hour  Resume insulin pump at basal rate  10 units short acting insulin x1 now consult patient's primary endocrinologist-Case discussed  BMP every 6 hours-discontinue once anion gap is closed and blood glucose is back into normal baseline  Adjust insulin dosage      Chest pain/heaviness with radiation intrascapularly and into shoulders  Likely associated with anxiety from DKA  Nonetheless secondary to concerning symptoms, cardiology evaluation will be obtained  Patient with recent heart cath which was unremarkable for CAD        DVT prophylaxis  PT OT  Discharge plan    Stephania Temple MD  8/14/2022  10:04 AM

## 2022-08-15 VITALS
DIASTOLIC BLOOD PRESSURE: 75 MMHG | WEIGHT: 170 LBS | TEMPERATURE: 98.5 F | HEART RATE: 93 BPM | OXYGEN SATURATION: 91 % | RESPIRATION RATE: 18 BRPM | BODY MASS INDEX: 29.02 KG/M2 | HEIGHT: 64 IN | SYSTOLIC BLOOD PRESSURE: 135 MMHG

## 2022-08-15 LAB
EKG ATRIAL RATE: 103 BPM
EKG ATRIAL RATE: 122 BPM
EKG P AXIS: 33 DEGREES
EKG P AXIS: 55 DEGREES
EKG P-R INTERVAL: 154 MS
EKG P-R INTERVAL: 160 MS
EKG Q-T INTERVAL: 308 MS
EKG Q-T INTERVAL: 354 MS
EKG QRS DURATION: 88 MS
EKG QRS DURATION: 90 MS
EKG QTC CALCULATION (BAZETT): 438 MS
EKG QTC CALCULATION (BAZETT): 463 MS
EKG R AXIS: 46 DEGREES
EKG R AXIS: 47 DEGREES
EKG T AXIS: 33 DEGREES
EKG T AXIS: 45 DEGREES
EKG VENTRICULAR RATE: 103 BPM
EKG VENTRICULAR RATE: 122 BPM
TROPONIN, HIGH SENSITIVITY: 10 NG/L (ref 0–9)

## 2022-08-15 PROCEDURE — 99232 SBSQ HOSP IP/OBS MODERATE 35: CPT | Performed by: INTERNAL MEDICINE

## 2022-08-15 PROCEDURE — 36415 COLL VENOUS BLD VENIPUNCTURE: CPT

## 2022-08-15 PROCEDURE — 6370000000 HC RX 637 (ALT 250 FOR IP): Performed by: INTERNAL MEDICINE

## 2022-08-15 PROCEDURE — 6370000000 HC RX 637 (ALT 250 FOR IP): Performed by: PHYSICIAN ASSISTANT

## 2022-08-15 PROCEDURE — 84484 ASSAY OF TROPONIN QUANT: CPT

## 2022-08-15 RX ORDER — LEVOTHYROXINE SODIUM 0.1 MG/1
100 TABLET ORAL DAILY
Qty: 30 TABLET | Refills: 3 | Status: SHIPPED | OUTPATIENT
Start: 2022-08-16

## 2022-08-15 RX ORDER — LEVOTHYROXINE SODIUM 0.05 MG/1
100 TABLET ORAL DAILY
Status: DISCONTINUED | OUTPATIENT
Start: 2022-08-16 | End: 2022-08-15 | Stop reason: HOSPADM

## 2022-08-15 RX ORDER — ATORVASTATIN CALCIUM 40 MG/1
40 TABLET, FILM COATED ORAL NIGHTLY
Qty: 30 TABLET | Refills: 3 | Status: SHIPPED | OUTPATIENT
Start: 2022-08-15

## 2022-08-15 RX ORDER — PANTOPRAZOLE SODIUM 40 MG/1
40 TABLET, DELAYED RELEASE ORAL
Qty: 30 TABLET | Refills: 3 | Status: SHIPPED | OUTPATIENT
Start: 2022-08-16

## 2022-08-15 RX ADMIN — PANTOPRAZOLE SODIUM 40 MG: 40 TABLET, DELAYED RELEASE ORAL at 06:18

## 2022-08-15 RX ADMIN — MELOXICAM 7.5 MG: 7.5 TABLET ORAL at 08:43

## 2022-08-15 RX ADMIN — LOSARTAN POTASSIUM 50 MG: 25 TABLET, FILM COATED ORAL at 08:42

## 2022-08-15 RX ADMIN — BUSPIRONE HYDROCHLORIDE 15 MG: 10 TABLET ORAL at 08:41

## 2022-08-15 RX ADMIN — DULOXETINE HYDROCHLORIDE 60 MG: 60 CAPSULE, DELAYED RELEASE ORAL at 08:42

## 2022-08-15 RX ADMIN — LEVOTHYROXINE SODIUM 88 MCG: 0.09 TABLET ORAL at 06:19

## 2022-08-15 RX ADMIN — ARIPIPRAZOLE 2 MG: 2 TABLET ORAL at 08:44

## 2022-08-15 RX ADMIN — HYDROXYCHLOROQUINE SULFATE 200 MG: 200 TABLET ORAL at 08:44

## 2022-08-15 ASSESSMENT — PAIN SCALES - GENERAL: PAINLEVEL_OUTOF10: 0

## 2022-08-15 NOTE — CARE COORDINATION
Spoke with patient. She is from home, typically independent, does not use assistive devices. PCP is Dr. Missy Sylvester. No current homecare. She feels she is able to manage her DM1 with insulin pump typically. She plans to return home with no new needs when released. For questions I can be reached at [phone].  [place], [place]

## 2022-08-15 NOTE — PLAN OF CARE
Problem: Discharge Planning  Goal: Discharge to home or other facility with appropriate resources  8/15/2022 1152 by Axel Cueva RN  Outcome: Completed  8/15/2022 1152 by Axel Cueva RN  Outcome: Progressing  Flowsheets (Taken 8/15/2022 0830)  Discharge to home or other facility with appropriate resources: Identify discharge learning needs (meds, wound care, etc)     Problem: Pain  Goal: Verbalizes/displays adequate comfort level or baseline comfort level  8/15/2022 1152 by Axel Cueva RN  Outcome: Completed  8/15/2022 1152 by Axel Cueva RN  Outcome: Progressing

## 2022-08-15 NOTE — DISCHARGE SUMMARY
[facility]   Discharge summary   Patient ID:  Oscar Robin  [id number]  18 y.o.  1980    Admit date: 8/13/2022    Discharge date and time: 8/15/2022    Admission Diagnoses:   Patient Active Problem List   Diagnosis    Diabetes mellitus type 1, controlled, without complications (HCC)    Epigastric pain    Nausea & vomiting    SIRS (systemic inflammatory response syndrome) (HCC)    Slow transit constipation    Diabetic ketoacidosis associated with diabetes mellitus due to underlying condition (HCC)    Hypophosphatemia    Sinus tachycardia    At high risk for breast cancer    DKA, type 1, not at goal [facility])    Type 1 diabetes mellitus with diabetic neuropathy ([facility] [address]    Atypical chest pain    Primary hypertension    Pure hypercholesterolemia       Discharge Diagnoses:DKA    Consults: Endocrinology, Cardiology    Procedures: None    Hospital Course: The patient is a 36 y.o. female of [facility]     21-year-old  woman admitted for evaluation of diabetic ketoacidosis generalized fatigue and weakness, chest heaviness with sinus tachycardia     Aggressive IV fluid hydration with normal saline at 150 cc an hour  Resume insulin pump at basal rate  10 units short acting insulin x1 now consult patient's primary endocrinologist-Case discussed  BMP every 6 hours-discontinue once anion gap is closed and blood glucose is back into normal baseline - gap is closed  Adjust insulin dosage - per endocrinology    Insulin pump dosage adjusted and patient verbally understands he settings. Chest pain/heaviness with radiation intrascapularly and into shoulders  Likely associated with anxiety from DKA  Nonetheless secondary to concerning symptoms, cardiology evaluation will be obtained  Patient with recent heart cath which was unremarkable for CAD - signed off    Patient discharged home in stable condition with medications listed below.   Patient was instructed to follow up with all consults and PCP within the next two week. Recent Labs     08/13/22  0928 08/14/22  0457   WBC 14.8* 8.1   HGB 14.0 12.2   HCT 41.1 34.9    252       Recent Labs     08/13/22  1529 08/14/22  0714 08/14/22  1550    134 134   K 3.9 4.1 4.0    100 105   CO2 23 15* 17*   BUN 16 17 14   CREATININE 0.9 1.1* 1.0   CALCIUM 8.5* 8.8 8.4*       XR CHEST (2 VW)    Result Date: 8/13/2022  EXAMINATION: TWO XRAY VIEWS OF THE CHEST 8/13/2022 9:03 am COMPARISON: 04/13/2018 HISTORY: ORDERING SYSTEM PROVIDED HISTORY: cough TECHNOLOGIST PROVIDED HISTORY: Reason for exam:->cough What reading provider will be dictating this exam?->CRC FINDINGS: The lungs are without acute focal process. There is no effusion or pneumothorax. The cardiomediastinal silhouette is without acute process. The osseous structures are without acute process. No acute process. Discharge Exam:    HEENT: NCAT,  PERRLA, No JVD  Heart:  RRR, no murmurs, gallops, or rubs. Lungs:  CTA bilaterally, no wheeze, rales or rhonchi  Abd: bowel sounds present, nontender, nondistended, no masses  Extrem:  No clubbing, cyanosis, or edema    Disposition: home     Patient Condition at Discharge: Stable    Patient Instructions:      Medication List        START taking these medications      atorvastatin 40 MG tablet  Commonly known as: LIPITOR  Take 1 tablet by mouth nightly     pantoprazole 40 MG tablet  Commonly known as: PROTONIX  Take 1 tablet by mouth every morning (before breakfast)  Start taking on: August 16, 2022            CHANGE how you take these medications      insulin aspart 100 UNIT/ML injection vial  Commonly known as: NOVOLOG  basal rate 12a 1.0, 8a 1.55, 10p 1.0, CR 12, ISF 45, goal 100-150, active insulin time 3 hrs. What changed: additional instructions     levothyroxine 100 MCG tablet  Commonly known as: SYNTHROID  Take 1 tablet by mouth in the morning.   Start taking on: August 16, 2022  What changed:   medication strength  how much to take            CONTINUE taking these medications      ARIPiprazole 2 MG tablet  Commonly known as: ABILIFY     busPIRone 15 MG tablet  Commonly known as: BUSPAR     DULoxetine 30 MG extended release capsule  Commonly known as: CYMBALTA     esomeprazole Magnesium 20 MG Pack  Commonly known as: NEXIUM     gabapentin 100 MG capsule  Commonly known as: NEURONTIN     lidocaine viscous hcl 2 % Soln solution  Commonly known as: XYLOCAINE  Use small amount vaginally as needed for pain. Liletta (52 MG) 20.1 MCG/DAY Iud IUD 52 mg  Generic drug: levonorgestrel     losartan 50 MG tablet  Commonly known as: COZAAR     Mobic 7.5 MG tablet  Generic drug: meloxicam     * Omnipod DASH PDM (Gen 4) Kit  To continuously administer inuslin     * Omnipod DASH Pods (Gen 4) Misc  USE TO CONTINUOUSLY        ADMINISTER INSULIN AND     CHANGE EVERY 3 DAYS     * Omnipod 5 G6 Pod (Gen 5) Misc  Change pod every 72 hours     * Omnipod 5 G6 Intro (Gen 5) Kit  Use to infuse insulin     OneTouch Ultra strip  Generic drug: blood glucose test strips  CHECK BLOOD SUGAR FOUR TIMES DAILY     Plaquenil 200 MG tablet  Generic drug: hydroxychloroquine     traZODone 100 MG tablet  Commonly known as: DESYREL     Vitamin D3 50 MCG (2000 UT) Caps           * This list has 4 medication(s) that are the same as other medications prescribed for you. Read the directions carefully, and ask your doctor or other care provider to review them with you. Where to Get Your Medications        These medications were sent to [address] [place] [address] [phone]  [address] [place] [id number]      Phone: [phone]   atorvastatin 40 MG tablet  insulin aspart 100 UNIT/ML injection vial  levothyroxine 100 MCG tablet  pantoprazole 40 MG tablet       Activity: activity as tolerated  Diet: diabetic diet    Pt has been advised to: Follow-up with William Patterson DO in 1 week.   Follow-up with consultants as recommended by them    Note that over 30 minutes was spent in preparing discharge papers, discussing discharge with patient, medication review, etc.    Signed:  JOSEPH Romero CNP  8/15/2022  12:00 PM     Above note edited to reflect my thoughts     I personally saw, examined and provided care for the patient. Radiographs, labs and medication list were reviewed by me independently. The case was discussed in detail and plans for care were established. Review of Maricruz MARINELLI CNP, documentation was conducted and revisions were made as appropriate directly by me. I agree with the above documented exam, problem list, and plan of care.      Yesika Albert MD  9:18 PM  8/15/2022

## 2022-08-15 NOTE — PROGRESS NOTES
ENDOCRINOLOGY PROGRESS NOTE   [facility]ia Tele-Health Service        Date of admission: 8/13/2022  Date of service: 8/15/2022  Admitting physician: Zonia Torres MD   Primary Care Physician: Amita Young DO  Consultant physician: Dashawn Lopez MD      Reason for the consultation:  DM type 1, DKA      Hpi:  The patient is a 36 y.o. female of Amita Young DO with significant past medical history of insulin-dependent diabetes mellitus type 2, on insulin pump who presents with complaints of generalized weakness fatigue nausea vomiting and found to be in DKA     Subjective: This morning patient was alert, awake, oriented following al the commands. Insulin pump has been adjusted today and blood sugar in BMP was 182. On this admission TSH was 9.5 and fT4 was 1.48. Patient was on synthroid 88 mcg daily and compliant with the medication. Point of care glucose monitoring (Independently reviewed)   Recent Labs     08/13/22  1239 08/13/22  1243 08/13/22  1458 08/13/22  1638 08/13/22  1748 08/13/22  2016 08/14/22  1059   GLUMET 173* 166* 81 99 123* 318* 249*       No current facility-administered medications for this encounter. Current Outpatient Medications   Medication Sig Dispense Refill    atorvastatin (LIPITOR) 40 MG tablet Take 1 tablet by mouth nightly 30 tablet 3    [START ON 8/16/2022] levothyroxine (SYNTHROID) 100 MCG tablet Take 1 tablet by mouth in the morning. 30 tablet 3    [START ON 8/16/2022] pantoprazole (PROTONIX) 40 MG tablet Take 1 tablet by mouth every morning (before breakfast) 30 tablet 3    insulin aspart (NOVOLOG) 100 UNIT/ML injection vial basal rate 12a 1.0, 8a 1.55, 10p 1.0, CR 12, ISF 45, goal 100-150, active insulin time 3 hrs. 30 mL 11    meloxicam (MOBIC) 7.5 MG tablet Take 7.5 mg by mouth in the morning and 7.5 mg before bedtime. busPIRone (BUSPAR) 15 MG tablet Take 15 mg by mouth in the morning and 15 mg before bedtime.       traZODone (DESYREL) 100 MG tablet Take 100 mg by mouth nightly      Insulin Disposable Pump (OMNIPOD 5 G6 POD, GEN 5,) MISC Change pod every 72 hours 10 each 3    Insulin Disposable Pump (OMNIPOD 5 G6 INTRO, GEN 5,) KIT Use to infuse insulin 1 kit 0    hydroxychloroquine (PLAQUENIL) 200 MG tablet Take 200 mg by mouth 2 times daily      ONETOUCH ULTRA strip CHECK BLOOD SUGAR FOUR TIMES DAILY 150 strip 5    Levonorgestrel (LILETTA, 52 MG,) IUD 52 mg 1 each by IntraUTERine route once Inserted 1/19/2022      Insulin Disposable Pump (OMNIPOD DASH 5 PACK PODS) MISC USE TO CONTINUOUSLY        ADMINISTER INSULIN AND     CHANGE EVERY 3 DAYS 30 each 3    esomeprazole Magnesium (NEXIUM) 20 MG PACK Take 40 mg by mouth in the morning. lidocaine viscous hcl (XYLOCAINE) 2 % SOLN solution Use small amount vaginally as needed for pain. 100 mL 0    Insulin Disposable Pump (OMNIPOD DASH SYSTEM) KIT To continuously administer inuslin 1 kit 0    ARIPiprazole (ABILIFY) 2 MG tablet Take 2 mg by mouth daily      Cholecalciferol (VITAMIN D3) 2000 UNITS CAPS Take 25,000 Units by mouth once a week wed      gabapentin (NEURONTIN) 100 MG capsule Take 300 mg by mouth nightly. Takes nightly      losartan (COZAAR) 50 MG tablet Take 50 mg by mouth daily  0    DULoxetine (CYMBALTA) 30 MG capsule Take 60 mg by mouth 2 times daily         ROS   All systems reviewed. All negative except for symptoms mentioned in HPI     Physical examination:  Due to this being a TeleHealth encounter, evaluation of the following organ systems is limited: Vitals/Constitutional/EENT/Resp/CV/GI//MS/Neuro/Skin/Heme-Lymph-Imm. Modified physical exam through Telemedicine camera    General: Communicating well via camera   Neck: no obvious neck mass. No obvious neck deformity     CVS: no distress   Chest: no distress.  Chest is moving with respiration    Extremities:  no visible tremor  Skin: No visible rashes as seen from camera   Musculoskeletal: no visible deformity  Neuro: Alert and oriented to person, place, and time. Psychiatric: Normal mood and affect. Behavior is normal      Labs:  Lab Results   Component Value Date/Time    WBC 8.1 08/14/2022 04:57 AM    RBC 3.83 08/14/2022 04:57 AM    HGB 12.2 08/14/2022 04:57 AM    HCT 34.9 08/14/2022 04:57 AM    MCV 91.1 08/14/2022 04:57 AM    MCH 31.9 08/14/2022 04:57 AM    MCHC 35.0 (H) 08/14/2022 04:57 AM    RDW 12.0 08/14/2022 04:57 AM     08/14/2022 04:57 AM    MPV 9.3 08/14/2022 04:57 AM      Lab Results   Component Value Date/Time     08/14/2022 03:50 PM    K 4.0 08/14/2022 03:50 PM    K 4.2 08/13/2022 01:12 PM    CO2 17 (L) 08/14/2022 03:50 PM    BUN 14 08/14/2022 03:50 PM    CREATININE 1.0 08/14/2022 03:50 PM    CALCIUM 8.4 (L) 08/14/2022 03:50 PM    LABGLOM >60 08/14/2022 03:50 PM    GFRAA >60 08/14/2022 03:50 PM      Lab Results   Component Value Date/Time    TSH 9.530 (H) 08/13/2022 09:28 AM    T4FREE 1.48 08/13/2022 09:28 AM    FT3 2.8 12/03/2015 07:00 AM    X3WGHKA 103.80 12/03/2015 07:00 AM     Lab Results   Component Value Date/Time    LABA1C 6.6 08/14/2022 11:32 AM    GLUCOSE 182 08/14/2022 03:50 PM    MALBCR - 03/24/2022 09:15 AM    LABMICR <12.0 03/24/2022 09:15 AM    LABCREA 74 03/24/2022 09:15 AM     Lab Results   Component Value Date/Time    LABA1C 6.6 08/14/2022 11:32 AM    LABA1C 6.6 08/13/2022 09:28 AM    LABA1C 7.1 03/24/2022 08:51 AM     Lab Results   Component Value Date/Time    TRIG 120 04/07/2021 07:47 AM    HDL 51 04/07/2021 07:47 AM    LDLCALC 96 04/07/2021 07:47 AM    CHOL 171 04/07/2021 07:47 AM     Lab Results   Component Value Date/Time    VITD25 24 03/24/2022 09:01 AM    VITD25 22 04/07/2021 07:47 AM     Assessment ad plan:   Jaime Monte is seen today in the hospital for DM management       DM type 1 admitted in  DKA   Doing better today, most BG at goal  Continue current insulin Pump settings: basal rate 12a 1.0, 8a 1.55, 10p 1.0, CR 12, ISF 45, goal 100-150, active insulin time 3 hrs.    Ok to be discharged from Endocrinology  Follow up as outpatient with endocrinology in 2 weeks. Hypothyroidism   Increased levothyroxine 100 mcg daily      The above issues were reviewed with the patient who understood and agreed with the plan. Thank you for allowing us to participate in the care of this patient. Please do not hesitate to contact us with any additional questions. I saw the patient and discussed the management with the resident physician Dr. Kayleigh Hodge MD.  I reviewed and agree with the findings and plan as documented in the resident's note    Tucker Finney MD  Endocrinologist, [facility]   [address] [place] [address], [address] [place] [address],[facility] [id number]   Phone: [phone]  Fax: [phone]  --------------------------  An electronic signature was used to authenticate this note.  Sabina Kimball MD on 8/15/2022 at 7:49 PM

## 2022-08-16 ENCOUNTER — TELEPHONE (OUTPATIENT)
Dept: ADMINISTRATIVE | Age: 42
End: 2022-08-16

## 2022-08-16 NOTE — TELEPHONE ENCOUNTER
Patient called to schedule a hospital follow up with Dr Lucas Monroy. [facility] discharge 8/15 for high blood sugar. Please call patient to schedule. Thank you!

## 2022-08-17 ENCOUNTER — OFFICE VISIT (OUTPATIENT)
Dept: ENDOCRINOLOGY | Age: 42
End: 2022-08-17
Payer: COMMERCIAL

## 2022-08-17 VITALS
HEIGHT: 64 IN | OXYGEN SATURATION: 96 % | BODY MASS INDEX: 29.02 KG/M2 | SYSTOLIC BLOOD PRESSURE: 154 MMHG | DIASTOLIC BLOOD PRESSURE: 87 MMHG | WEIGHT: 170 LBS | HEART RATE: 97 BPM

## 2022-08-17 DIAGNOSIS — Z96.41 INSULIN PUMP IN PLACE: ICD-10-CM

## 2022-08-17 DIAGNOSIS — E10.9 TYPE 1 DIABETES MELLITUS WITHOUT COMPLICATION (HCC): Primary | ICD-10-CM

## 2022-08-17 DIAGNOSIS — E16.2 HYPOGLYCEMIA: ICD-10-CM

## 2022-08-17 DIAGNOSIS — E03.9 HYPOTHYROIDISM, UNSPECIFIED TYPE: ICD-10-CM

## 2022-08-17 PROCEDURE — G8427 DOCREV CUR MEDS BY ELIG CLIN: HCPCS | Performed by: CLINICAL NURSE SPECIALIST

## 2022-08-17 PROCEDURE — 1111F DSCHRG MED/CURRENT MED MERGE: CPT | Performed by: CLINICAL NURSE SPECIALIST

## 2022-08-17 PROCEDURE — 3044F HG A1C LEVEL LT 7.0%: CPT | Performed by: CLINICAL NURSE SPECIALIST

## 2022-08-17 PROCEDURE — 95251 CONT GLUC MNTR ANALYSIS I&R: CPT | Performed by: CLINICAL NURSE SPECIALIST

## 2022-08-17 PROCEDURE — G8419 CALC BMI OUT NRM PARAM NOF/U: HCPCS | Performed by: CLINICAL NURSE SPECIALIST

## 2022-08-17 PROCEDURE — 2022F DILAT RTA XM EVC RTNOPTHY: CPT | Performed by: CLINICAL NURSE SPECIALIST

## 2022-08-17 PROCEDURE — 1036F TOBACCO NON-USER: CPT | Performed by: CLINICAL NURSE SPECIALIST

## 2022-08-17 PROCEDURE — 99214 OFFICE O/P EST MOD 30 MIN: CPT | Performed by: CLINICAL NURSE SPECIALIST

## 2022-08-17 NOTE — PROGRESS NOTES
[address] Department of Endocrinology Diabetes and Metabolism   [address] [facility] [address]   Phone: [phone]  Fax: [phone]    Date of Service: 8/17/2022  Primary Care Physician: Mynor Rivera DO  Provider: JOSEPH Castillo      Reason for the visit:  DM type 1 on Omnipod insulin pump, hypothyroidism     History of Present Illness: The history is provided by the patient. No  was used. Accuracy of the patient data is excellent. Lili Saucedo is a very pleasant 36 y.o. female seen today for follow up visit     Lili Saucedo was diagnosed with diabetes at age 2  Omnipod pump DEXCOM cgm 6. Current pump settings: basal rate 12a 1.0, 7a 1.55, 9p 1.0, CR 12, ISF 12a 45, 1, goal 100-150, active insulin time 3 hrs    The patient has been checking blood sugar multiple times a day using DEXCOM   Average   Forgets to bolus at times when she eats   Lab Results   Component Value Date/Time    LABA1C 6.6 08/14/2022 11:32 AM    LABA1C 6.6 08/13/2022 09:28 AM    LABA1C 7.1 03/24/2022 08:51 AM     Patient reported hypoglycemic episodes, usually in am   The patient has been mindful of what has been eating and following diabetic diet as encouraged  I reviewed current medications and the patient has no issues with diabetes medications  Lili Saucedo is up to date with eye exam and denied any history of diabetic retinopathy   The patient seeing performs her own feet care  Microvascular complications:  No Retinopathy, no Nephropathy + Neuropathy   Macrovascular complications: no CAD, PVD, or Stroke  The patient receives Flushot every year    Recently hospitalized for DKA  Adjustments to her pump were made at that time     Hypothyroidism    On levothyroxine 100 mcg daily. Patient takes levothyroxine in the morning at empty stomach, wait one hour before eating , avoid multivitamins containing calcium  or iron with it.   Dose recently increased     Lab Results   Component Value Date    TSH 9.530 (H) 2022    Q8EWBLJ 103.80 2015    FT3 2.8 2015    T4FREE 1.48 2022       PAST MEDICAL HISTORY   Past Medical History:   Diagnosis Date    Anxiety     Depression     Diabetes mellitus type 1, controlled, without complications ([facility] [address] 2428    Epicondylitis, lateral 2017    right    GERD (gastroesophageal reflux disease)     Hypertension     IBS (irritable bowel syndrome)     Insulin pump status has insulin pump    follows with Dr. David Vázquez    SIRS (systemic inflammatory response syndrome) ([facility] 2016    Thyroid disease     Trigger finger     right index and little finger       PAST SURGICAL HISTORY   Past Surgical History:   Procedure Laterality Date    CARPAL TUNNEL RELEASE  2011    left hand     DEBRIDEMENT Right 2017    Right lateral epicondylar debridement    ENDOSCOPY, COLON, DIAGNOSTIC      FINGER TRIGGER RELEASE      middle finger bilat, and right thumb  / multiple  / last one 2017    FINGER TRIGGER RELEASE Right 2016    4th finger    FINGER TRIGGER RELEASE Right 4/3/2019    TRIGGER RELEASE RIGHT INDEX AND SMALL FINGER performed by Mackenzie Hernandez MD at [address] [place] [address]  2009    exploratory     [place]  DELIVERY ONLY  x2    , Low Cervical    SKIN BIOPSY         SOCIAL HISTORY   Tobacco:   reports that she has never smoked. She has never used smokeless tobacco.  Alcohol:   reports current alcohol use. Drugs:   reports no history of drug use. FAMILY HISTORY   Family History   Problem Relation Age of Onset    Cancer Mother     High Blood Pressure Maternal Grandmother        ALLERGIES AND DRUG REACTIONS   Allergies   Allergen Reactions    Penicillins Hives    Sulfa Antibiotics Rash     fever       CURRENT MEDICATIONS   Current Outpatient Medications   Medication Sig Dispense Refill    levothyroxine (SYNTHROID) 100 MCG tablet Take 1 tablet by mouth in the morning.  30 tablet 3    insulin aspart (NOVOLOG) 100 UNIT/ML injection vial basal rate 12a 1.0, 8a 1.55, 10p 1.0, CR 12, ISF 45, goal 100-150, active insulin time 3 hrs. 30 mL 11    Insulin Disposable Pump (OMNIPOD 5 G6 INTRO, GEN 5,) KIT Use to infuse insulin 1 kit 0    atorvastatin (LIPITOR) 40 MG tablet Take 1 tablet by mouth nightly 30 tablet 3    pantoprazole (PROTONIX) 40 MG tablet Take 1 tablet by mouth every morning (before breakfast) 30 tablet 3    meloxicam (MOBIC) 7.5 MG tablet Take 7.5 mg by mouth in the morning and 7.5 mg before bedtime. busPIRone (BUSPAR) 15 MG tablet Take 15 mg by mouth in the morning and 15 mg before bedtime. traZODone (DESYREL) 100 MG tablet Take 100 mg by mouth nightly      Insulin Disposable Pump (OMNIPOD 5 G6 POD, GEN 5,) MISC Change pod every 72 hours 10 each 3    hydroxychloroquine (PLAQUENIL) 200 MG tablet Take 200 mg by mouth 2 times daily      ONETOUCH ULTRA strip CHECK BLOOD SUGAR FOUR TIMES DAILY 150 strip 5    Levonorgestrel (LILETTA, 52 MG,) IUD 52 mg 1 each by IntraUTERine route once Inserted 1/19/2022      Insulin Disposable Pump (OMNIPOD DASH 5 PACK PODS) MISC USE TO CONTINUOUSLY        ADMINISTER INSULIN AND     CHANGE EVERY 3 DAYS 30 each 3    esomeprazole Magnesium (NEXIUM) 20 MG PACK Take 40 mg by mouth in the morning. lidocaine viscous hcl (XYLOCAINE) 2 % SOLN solution Use small amount vaginally as needed for pain. 100 mL 0    Insulin Disposable Pump (OMNIPOD DASH SYSTEM) KIT To continuously administer inuslin 1 kit 0    ARIPiprazole (ABILIFY) 2 MG tablet Take 2 mg by mouth daily      Cholecalciferol (VITAMIN D3) 2000 UNITS CAPS Take 25,000 Units by mouth once a week wed      gabapentin (NEURONTIN) 100 MG capsule Take 300 mg by mouth nightly. Takes nightly      losartan (COZAAR) 50 MG tablet Take 50 mg by mouth daily  0    DULoxetine (CYMBALTA) 30 MG capsule Take 60 mg by mouth 2 times daily        No current facility-administered medications for this visit.        Review of Systems  Constitutional: No fever, no chills, no diaphoresis, no generalized weakness. HEENT: No blurred vision, No sore throat, no ear pain, no hair loss  Neck: denied any neck swelling, difficulty swallowing,   Cardio-pulmonary: No CP, SOB or palpitation, No orthopnea or PND. No cough or wheezing. GI: No N/V/D, no constipation, No abdominal pain, no melena or hematochezia   : Denied any dysuria, hematuria, flank pain, discharge, or incontinence. Skin: denied any rash, ulcer, Hirsute, or hyperpigmentation. MSK: denied any joint deformity, joint pain/swelling, muscle pain, or back pain. Neuro: no numbness, no tingling, no weakness, _    OBJECTIVE    BP (!) 154/87   Pulse 97   Ht 5' 4\" (1.626 m)   Wt 170 lb (77.1 kg)   SpO2 96%   BMI 29.18 kg/m²   BP Readings from Last 4 Encounters:   08/17/22 (!) 154/87   08/15/22 135/75   06/07/22 117/73   05/19/22 128/74     Wt Readings from Last 6 Encounters:   08/17/22 170 lb (77.1 kg)   08/13/22 170 lb (77.1 kg)   06/23/22 165 lb (74.8 kg)   06/07/22 165 lb 11.2 oz (75.2 kg)   05/24/22 160 lb (72.6 kg)   05/19/22 164 lb (74.4 kg)     Physical examination:  General: awake alert, oriented x3, no abnormal position or movements. HEENT: normocephalic non traumatic, no exophthalmos   Neck: supple, no LN enlargement, no thyromegaly, no thyroid tenderness, no JVD. Pulm: Clear equal air entry no added sounds, no wheezing or rhonchi    CVS: S1 + S2, no murmur, no heave. Dorsalis pedis pulse palpable   Abd: soft lax, no tenderness, no organomegaly, audible bowel sounds. Skin: warm, no lesions, no rash.  No callus, no Ulcers, No acanthosis nigricans   Neuro: CN intact, sensation present bilateral , muscle power normal  Psych: normal mood, and affect    Review of Laboratory Data:  I personally reviewed the following lab:  Lab Results   Component Value Date/Time    WBC 8.1 08/14/2022 04:57 AM    RBC 3.83 08/14/2022 04:57 AM    HGB 12.2 08/14/2022 04:57 AM    HCT 34.9 08/14/2022 04:57 AM    MCV 91.1 08/14/2022 04:57 AM    MCH 31.9 08/14/2022 04:57 AM    MCHC 35.0 (H) 08/14/2022 04:57 AM    RDW 12.0 08/14/2022 04:57 AM     08/14/2022 04:57 AM    MPV 9.3 08/14/2022 04:57 AM      Lab Results   Component Value Date/Time     08/14/2022 03:50 PM    K 4.0 08/14/2022 03:50 PM    K 4.2 08/13/2022 01:12 PM    CO2 17 (L) 08/14/2022 03:50 PM    BUN 14 08/14/2022 03:50 PM    CREATININE 1.0 08/14/2022 03:50 PM    CALCIUM 8.4 (L) 08/14/2022 03:50 PM    LABGLOM >60 08/14/2022 03:50 PM    GFRAA >60 08/14/2022 03:50 PM      Lab Results   Component Value Date/Time    TSH 9.530 (H) 08/13/2022 09:28 AM    T4FREE 1.48 08/13/2022 09:28 AM    FT3 2.8 12/03/2015 07:00 AM    F6LTYYN 103.80 12/03/2015 07:00 AM     Lab Results   Component Value Date/Time    LABA1C 6.6 08/14/2022 11:32 AM    GLUCOSE 182 08/14/2022 03:50 PM    MALBCR - 03/24/2022 09:15 AM    LABMICR <12.0 03/24/2022 09:15 AM    LABCREA 74 03/24/2022 09:15 AM     Lab Results   Component Value Date/Time    LABA1C 6.6 08/14/2022 11:32 AM    LABA1C 6.6 08/13/2022 09:28 AM    LABA1C 7.1 03/24/2022 08:51 AM     Lab Results   Component Value Date/Time    TRIG 120 04/07/2021 07:47 AM    HDL 51 04/07/2021 07:47 AM    LDLCALC 96 04/07/2021 07:47 AM    CHOL 171 04/07/2021 07:47 AM     Lab Results   Component Value Date/Time    VITD25 24 03/24/2022 09:01 AM    VITD25 22 04/07/2021 07:47 AM     ASSESSMENT & RECOMMENDATIONS   Katie Calderon, a 36 y.o.-old female seen in for the following issues     Diabetes Mellitus Type 1     Patient diabetes variable  Patient having hypoglycemia in the early morning  Blood sugars higher postprandial but she is not always bolusing with meals  On Omnipod insulin pump with DEXCOM CGM  We will decrease midnight basal rate to 0.8  Insulin pump settings will be:  basal rate 12a 0.8,  7a 1.55, 9p 1.0, CR 12, ISF 12a 45, 1, goal 100-150, active insulin time 3 hrs     Encouraged to bolus with each meal  Discussed with patient A1c and blood sugar goals   Patient up to date with the routine diabetes maintenance and prevention  Advised to call if hypoglycemia becomes more frequent or does not resolve    Continuous Glucose Monitoring (CGM) download and interpretation   I personally reviewed and interpreted continuous glucose monitor (CGM) download. CGM report was discussed with patient including blood glucose patterns, percentages of blood glucose at goal, above goal and below goal. Insulin dosages/antidiabetic regimen was adjusted according to CGM download. Full CGM was scanned under media. Hypoglycemia   Continue using DEXCOM CGM  Counseled on treatment of hypoglycemia    Primary Hypothyroidism   On Levothyroxine 100 mcg daily  Check level tat next OV     I personally spent > 30 minutes  reviewing  external notes from PCP and other patient's care team providers, and personally interpreted labs associated with the above diagnosis. I also ordered labs to further assess and manage the above addressed medical conditions    No follow-ups on file. The above issues were reviewed with the patient who understood and agreed with the plan. Thank you for allowing us to participate in the care of this patient. Please do not hesitate to contact us with any additional questions. Diagnosis Orders   1. Type 1 diabetes mellitus without complication ([facility] [address]        2. Insulin pump in place        3. Hypoglycemia        4. Hypothyroidism, unspecified type            JOSEPH Grant    [facility]   [address] [place] [address]   Phone: [phone]  Fax: [phone]  ----------------------------  An electronic signature was used to authenticate this note. JOSEPH Grant  on 8/17/2022 at 1:33 PM

## 2022-08-17 NOTE — PROGRESS NOTES
[address] [facility] Department of Endocrinology Diabetes and Metabolism   [address] [facility] [address]   Phone: [phone]  Fax: [phone]    Date of Service: 8/17/2022    Primary Care Physician: Kenia Rodriguez DO  Referring physician: No ref. provider found  Provider: JOSEPH Blank     Reason for the visit:      History of Present Illness: The history is provided by the patient. No  was used. Accuracy of the patient data is excellent. iBll Galaviz is a very pleasant 36 y.o. female seen today for diabetes management     Bill Galaviz was diagnosed with diabetes at age   and currently on   The patient has been checking blood sugar    .     Most recent A1c results summarized below  Lab Results   Component Value Date/Time    LABA1C 6.6 08/14/2022 11:32 AM    LABA1C 6.6 08/13/2022 09:28 AM    LABA1C 7.1 03/24/2022 08:51 AM     Patient reported hypoglycemic episodes  Patient has had no hypoglycemic episodes   The patient has been mindful of what has been eating and following diabetic diet as encouraged  The patient hasn't been mindful of what has been eating and wasn't following diabetes diet    I reviewed current medications and the patient has no issues with diabetes medications  Bill Galaviz is up to date with eye exam and denied any history of diabetic retinopathy   The patient is due for an eye exam. Last eye exam was   , no h/o diabetic retinopathy  The patient seeing podiatrist every   And also performs  own feet care  Microvascular complications:  No Retinopathy, Nephropathy or Neuropathy   Macrovascular complications: no CAD, PVD, or Stroke  The patient receives Flushot every year and up to date with the Pneumonia vaccine   The patient refuses Flushot and pneumonia vaccine     PAST MEDICAL HISTORY   Past Medical History:   Diagnosis Date    Anxiety     Depression     Diabetes mellitus type 1, controlled, without complications ([facility] [address] 4/3/6902    Epicondylitis, lateral 2017    right    GERD (gastroesophageal reflux disease)     Hypertension     IBS (irritable bowel syndrome)     Insulin pump status has insulin pump    follows with Dr. Lynn Frias    SIRS (systemic inflammatory response syndrome) ([facility] [address] 2016    Thyroid disease     Trigger finger     right index and little finger       PAST SURGICAL HISTORY   Past Surgical History:   Procedure Laterality Date    CARPAL TUNNEL RELEASE  2011    left hand     DEBRIDEMENT Right 2017    Right lateral epicondylar debridement    ENDOSCOPY, COLON, DIAGNOSTIC      FINGER TRIGGER RELEASE      middle finger bilat, and right thumb  / multiple  / last one 2017    [address] Right 2016    4th finger    FINGER TRIGGER RELEASE Right 4/3/2019    TRIGGER RELEASE RIGHT INDEX AND SMALL FINGER performed by Reginaldo Bullock MD at [address] [place] [address]      exploratory     [place]  DELIVERY ONLY  x2    , Low Cervical    SKIN BIOPSY         SOCIAL HISTORY   Tobacco:   reports that she has never smoked. She has never used smokeless tobacco.  Alcohol:   reports current alcohol use. Drugs:   reports no history of drug use. FAMILY HISTORY   Family History   Problem Relation Age of Onset    Cancer Mother     High Blood Pressure Maternal Grandmother        ALLERGIES AND DRUG REACTIONS   Allergies   Allergen Reactions    Penicillins Hives    Sulfa Antibiotics Rash     fever       CURRENT MEDICATIONS   Current Outpatient Medications   Medication Sig Dispense Refill    atorvastatin (LIPITOR) 40 MG tablet Take 1 tablet by mouth nightly 30 tablet 3    levothyroxine (SYNTHROID) 100 MCG tablet Take 1 tablet by mouth in the morning.  30 tablet 3    pantoprazole (PROTONIX) 40 MG tablet Take 1 tablet by mouth every morning (before breakfast) 30 tablet 3    insulin aspart (NOVOLOG) 100 UNIT/ML injection vial basal rate 12a 1.0, 8a 1.55, 10p 1.0, CR 12, ISF 45, goal 100-150, active insulin time 3 hrs. 30 mL 11    meloxicam (MOBIC) 7.5 MG tablet Take 7.5 mg by mouth in the morning and 7.5 mg before bedtime. busPIRone (BUSPAR) 15 MG tablet Take 15 mg by mouth in the morning and 15 mg before bedtime. traZODone (DESYREL) 100 MG tablet Take 100 mg by mouth nightly      Insulin Disposable Pump (OMNIPOD 5 G6 POD, GEN 5,) MISC Change pod every 72 hours 10 each 3    Insulin Disposable Pump (OMNIPOD 5 G6 INTRO, GEN 5,) KIT Use to infuse insulin 1 kit 0    hydroxychloroquine (PLAQUENIL) 200 MG tablet Take 200 mg by mouth 2 times daily      ONETOUCH ULTRA strip CHECK BLOOD SUGAR FOUR TIMES DAILY 150 strip 5    Levonorgestrel (LILETTA, 52 MG,) IUD 52 mg 1 each by IntraUTERine route once Inserted 1/19/2022      Insulin Disposable Pump (OMNIPOD DASH 5 PACK PODS) MISC USE TO CONTINUOUSLY        ADMINISTER INSULIN AND     CHANGE EVERY 3 DAYS 30 each 3    esomeprazole Magnesium (NEXIUM) 20 MG PACK Take 40 mg by mouth in the morning. lidocaine viscous hcl (XYLOCAINE) 2 % SOLN solution Use small amount vaginally as needed for pain. 100 mL 0    Insulin Disposable Pump (OMNIPOD DASH SYSTEM) KIT To continuously administer inuslin 1 kit 0    ARIPiprazole (ABILIFY) 2 MG tablet Take 2 mg by mouth daily      Cholecalciferol (VITAMIN D3) 2000 UNITS CAPS Take 25,000 Units by mouth once a week wed      gabapentin (NEURONTIN) 100 MG capsule Take 300 mg by mouth nightly. Takes nightly      losartan (COZAAR) 50 MG tablet Take 50 mg by mouth daily  0    DULoxetine (CYMBALTA) 30 MG capsule Take 60 mg by mouth 2 times daily        No current facility-administered medications for this visit. Review of Systems  Constitutional: No fever, no chills, no diaphoresis, no generalized weakness. HEENT: No blurred vision, No sore throat, no ear pain, no hair loss  Neck: denied any neck swelling, difficulty swallowing,   Cardio-pulmonary: No CP, SOB or palpitation, No orthopnea or PND. No cough or wheezing.   GI: No N/V/D, no constipation, No abdominal pain, no melena or hematochezia   : Denied any dysuria, hematuria, flank pain, discharge, or incontinence. Skin: denied any rash, ulcer, Hirsute, or hyperpigmentation. MSK: denied any joint deformity, joint pain/swelling, muscle pain, or back pain. Neuro: no numbness, no tingling, no weakness, _    OBJECTIVE    There were no vitals taken for this visit. BP Readings from Last 4 Encounters:   08/15/22 135/75   06/07/22 117/73   05/19/22 128/74   03/24/22 132/84     Wt Readings from Last 6 Encounters:   08/13/22 170 lb (77.1 kg)   06/23/22 165 lb (74.8 kg)   06/07/22 165 lb 11.2 oz (75.2 kg)   05/24/22 160 lb (72.6 kg)   05/19/22 164 lb (74.4 kg)   03/24/22 170 lb (77.1 kg)       Physical examination:  General: awake alert, oriented x3, no abnormal position or movements. HEENT: normocephalic non-traumatic, no exophthalmos   Neck: supple, no LN enlargement, no thyromegaly, no thyroid tenderness, no JVD. Pulm: Clear equal air entry no added sounds, no wheezing or rhonchi    CVS: S1 + S2, no murmur, no heave. Dorsalis pedis pulse palpable   Abd: soft lax, no tenderness, no organomegaly, audible bowel sounds. Skin: warm, no lesions, no rash.  No callus, no Ulcers, No acanthosis nigricans  Musculoskeletal: No back tenderness, no kyphosis/scoliosis    Neuro: CN intact, Monofilament sensation decreased bilateral , muscle power normal  Psych: normal mood, and affect      Review of Laboratory Data:  I personally reviewed the following lab:  Lab Results   Component Value Date/Time    WBC 8.1 08/14/2022 04:57 AM    RBC 3.83 08/14/2022 04:57 AM    HGB 12.2 08/14/2022 04:57 AM    HCT 34.9 08/14/2022 04:57 AM    MCV 91.1 08/14/2022 04:57 AM    MCH 31.9 08/14/2022 04:57 AM    MCHC 35.0 (H) 08/14/2022 04:57 AM    RDW 12.0 08/14/2022 04:57 AM     08/14/2022 04:57 AM    MPV 9.3 08/14/2022 04:57 AM      Lab Results   Component Value Date/Time     08/14/2022 03:50 PM    K 4.0 08/14/2022 03:50 PM    K 4.2 08/13/2022 01:12 PM    CO2 17 (L) 08/14/2022 03:50 PM    BUN 14 08/14/2022 03:50 PM    CREATININE 1.0 08/14/2022 03:50 PM    CALCIUM 8.4 (L) 08/14/2022 03:50 PM    LABGLOM >60 08/14/2022 03:50 PM    GFRAA >60 08/14/2022 03:50 PM      Lab Results   Component Value Date/Time    TSH 9.530 (H) 08/13/2022 09:28 AM    T4FREE 1.48 08/13/2022 09:28 AM    FT3 2.8 12/03/2015 07:00 AM    W4NEUWS 103.80 12/03/2015 07:00 AM     Lab Results   Component Value Date/Time    LABA1C 6.6 08/14/2022 11:32 AM    GLUCOSE 182 08/14/2022 03:50 PM    MALBCR - 03/24/2022 09:15 AM    LABMICR <12.0 03/24/2022 09:15 AM    LABCREA 74 03/24/2022 09:15 AM     Lab Results   Component Value Date/Time    LABA1C 6.6 08/14/2022 11:32 AM    LABA1C 6.6 08/13/2022 09:28 AM    LABA1C 7.1 03/24/2022 08:51 AM     Lab Results   Component Value Date/Time    TRIG 120 04/07/2021 07:47 AM    HDL 51 04/07/2021 07:47 AM    LDLCALC 96 04/07/2021 07:47 AM    CHOL 171 04/07/2021 07:47 AM     Lab Results   Component Value Date/Time    VITD25 24 03/24/2022 09:01 AM    VITD25 22 04/07/2021 07:47 AM       ASSESSMENT & RECOMMENDATIONS   Navin Parks, a 36 y.o.-old female seen in for the following issues       Assessment:   {No diagnosis found. (Refresh or delete this SmartLink)}    Plan:     No diagnosis found. Diabetes Mellitus Type     Patient's diabetes is uncontrol   Will change DM regimen to   The patient was advised to check blood sugars 4 times a day before meals and at bedtime and send BS readings to our office in a week.   Discussed with patient A1c and blood sugar goals   Optimal blood sugars: 100-140 pre-prandial, < 180 peak post-prandial  The patient counseled about the complications of uncontrolled diabetes   Patient was counselled about the importance of self-blood glucose monitoring and eating consistent carb diet to avoid blood sugar fluctuations   Patient will need routine diabetes maintenance and prevention  The patient was referred to diabetic educator for further teaching   Discussed lifestyle changes including diet and exercise with patient; recommended 150 minutes of moderate intensity exercise per week. Diabetes labs before next visit     Dietary noncompliance  Discussed with patient the importance of eating consistent carbohydrate meals, avoiding high glycemic index food. Also, discussed with patient the risk and negative consequences of dietary noncompliance on blood glucose control, blood pressure and weight      Obesity  Discussed lifestyle changes including diet and exercise with patient in depth. Also discussed with patient cardiovascular risk associated with obesity    I personally reviewed external notes from PCP and other patient's care team providers, and personally interpreted labs associated with the above diagnosis. I also ordered labs to further assess and manage the above addressed medical conditions. No follow-ups on file. The above issues were reviewed with the patient who understood and agreed with the plan. Thank you for allowing us to participate in the care of this patient. Please do not hesitate to contact us with any additional questions. [facility]   [address] [place] [address]   Phone: [phone]  Fax: [phone]  --------------------------------------------  An electronic signature was used to authenticate this note.  JOSEPH Medina on 8/17/2022 at 1:25 PM

## 2022-08-25 ENCOUNTER — OFFICE VISIT (OUTPATIENT)
Dept: ORTHOPEDIC SURGERY | Age: 42
End: 2022-08-25
Payer: COMMERCIAL

## 2022-08-25 VITALS — BODY MASS INDEX: 28.17 KG/M2 | HEIGHT: 64 IN | WEIGHT: 165 LBS

## 2022-08-25 DIAGNOSIS — M25.521 RIGHT ELBOW PAIN: Primary | ICD-10-CM

## 2022-08-25 PROCEDURE — 1036F TOBACCO NON-USER: CPT | Performed by: ORTHOPAEDIC SURGERY

## 2022-08-25 PROCEDURE — 99213 OFFICE O/P EST LOW 20 MIN: CPT | Performed by: ORTHOPAEDIC SURGERY

## 2022-08-25 PROCEDURE — 1111F DSCHRG MED/CURRENT MED MERGE: CPT | Performed by: ORTHOPAEDIC SURGERY

## 2022-08-25 PROCEDURE — G8427 DOCREV CUR MEDS BY ELIG CLIN: HCPCS | Performed by: ORTHOPAEDIC SURGERY

## 2022-08-25 PROCEDURE — G8419 CALC BMI OUT NRM PARAM NOF/U: HCPCS | Performed by: ORTHOPAEDIC SURGERY

## 2022-08-25 NOTE — PROGRESS NOTES
Chief Complaint   Patient presents with    Elbow Pain     F/U Rt elbow pain - EMG complete    Carpal Tunnel     F/U Lt cts, EMG complete         Stephan Jimenez is a 36y.o. year old  who presents for follow up of conservative EMG. We have seen her in the past for numbness and tingling left hand and recently underwent trigger finger and carpal tunnel injections of the left hand. She has significant past medical history of right lateral epicondylar debridement 5 years ago. Post multiple trigger finger releases, she states she also had a carpal tunnel release many years ago as well. She had complaints of right-sided numbness and tingling an EMG was obtained per Dr. Naheed Broderick. She is here today discuss those findings. She complains of numbness and tingling over the palm of her hand as well as pain over the lateral elbow. The most concerning pain is over the lateral elbow. This issue does wake her up at night. She has tried extension time splinting. This mildly helps her symptoms. Patient had an EMG/NCS dated 6/23/2022    Right median nerve motor latency: 4.22  Left median nerve motor latency: Not tested  Right ulnar nerve velocity: 49 below the elbow, 41 above the elbow  Left ulnar nerve velocity: Not tested  Right median nerve sensory latency: 4.06  Left median nerve sensory latency: Not tested    EMG portion of the exam demonstrates some reduced recruitment in the right sided APB, otherwise no changes in the ulnar nerve distribution.       Past Medical History:   Diagnosis Date    Anxiety     Depression     Diabetes mellitus type 1, controlled, without complications ([facility] 2/9/2102    Epicondylitis, lateral 02/2017    right    GERD (gastroesophageal reflux disease)     Hypertension     IBS (irritable bowel syndrome)     Insulin pump status has insulin pump    follows with Dr. Alis Baldwin    SIRS (systemic inflammatory response syndrome) ([facility] 1/8/2016    Thyroid disease     Trigger finger     right index and little finger     Past Surgical History:   Procedure Laterality Date    CARPAL TUNNEL RELEASE      left hand     DEBRIDEMENT Right 2017    Right lateral epicondylar debridement    ENDOSCOPY, COLON, DIAGNOSTIC      FINGER TRIGGER RELEASE      middle finger bilat, and right thumb  / multiple  / last one 2017    FINGER TRIGGER RELEASE Right 2016    4th finger    FINGER TRIGGER RELEASE Right 4/3/2019    TRIGGER RELEASE RIGHT INDEX AND SMALL FINGER performed by Lashonda Joshi MD at [address] [place] [address]      exploratory     DE  DELIVERY ONLY  x2    , Low Cervical    SKIN BIOPSY         Current Outpatient Medications:     atorvastatin (LIPITOR) 40 MG tablet, Take 1 tablet by mouth nightly, Disp: 30 tablet, Rfl: 3    levothyroxine (SYNTHROID) 100 MCG tablet, Take 1 tablet by mouth in the morning., Disp: 30 tablet, Rfl: 3    pantoprazole (PROTONIX) 40 MG tablet, Take 1 tablet by mouth every morning (before breakfast), Disp: 30 tablet, Rfl: 3    insulin aspart (NOVOLOG) 100 UNIT/ML injection vial, basal rate 12a 1.0, 8a 1.55, 10p 1.0, CR 12, ISF 45, goal 100-150, active insulin time 3 hrs., Disp: 30 mL, Rfl: 11    meloxicam (MOBIC) 7.5 MG tablet, Take 7.5 mg by mouth in the morning and 7.5 mg before bedtime. , Disp: , Rfl:     busPIRone (BUSPAR) 15 MG tablet, Take 15 mg by mouth in the morning and 15 mg before bedtime. , Disp: , Rfl:     traZODone (DESYREL) 100 MG tablet, Take 100 mg by mouth nightly, Disp: , Rfl:     Insulin Disposable Pump (OMNIPOD 5 G6 POD, GEN 5,) MISC, Change pod every 72 hours, Disp: 10 each, Rfl: 3    Insulin Disposable Pump (OMNIPOD 5 G6 INTRO, GEN 5,) KIT, Use to infuse insulin, Disp: 1 kit, Rfl: 0    hydroxychloroquine (PLAQUENIL) 200 MG tablet, Take 200 mg by mouth 2 times daily, Disp: , Rfl:     ONETOUCH ULTRA strip, CHECK BLOOD SUGAR FOUR TIMES DAILY, Disp: 150 strip, Rfl: 5    Levonorgestrel (LILETTA, 52 MG,) IUD 52 mg, 1 each by IntraUTERine route once Stability: Not on file     Family History   Problem Relation Age of Onset    Cancer Mother     High Blood Pressure Maternal Grandmother        Skin: Acute skin changes  Musculoskeletal: Numbness and tingling right upper extremity, lateral elbow pain. Neurologic: Numbness and tingling  Cardiovascular: (No acute changes  SUBJECTIVE:      Constitutional:    The patient is alert and oriented x 3, appears to be stated age and in no distress. Right upper extremity:  Gross examination there is no significant swelling or deformity cruciated. Patient does have significant tenderness palpation over the mobile wad. Previous lateral epicondylar incision well-healed no tenderness over the incision. No tenderness over the insertion site of the lateral epicondyle. She has no pain with resisted extension. She does have a positive Tinel's of the cubital tunnel with radiating symptoms in the ulnar nerve distribution. On flexion-extension I do not appreciate a subluxing episode of her ulnar nerve. Minimal tenderness over the lacertus, negative Tinel's sign at the carpal tunnel    No new x-rays are taken at today's visit. Impression:   Right-sided cubital tunnel, concern for radial tunnel as well. Anxiety and depression  GERD  Hypertension  Insulin-dependent diabetes  Thyroid disease    Plan: Today's findings were explained the patient. We discussed that for her right-sided elbow symptoms we recommend extension splinting if it does help. We discussed that she has no muscular changes which drives towards immediate surgical intervention. We recommend therapy for the right elbow to see if we can relieve some of her lateral elbow pain as this is her main contributing factor for her presentation today. If therapy does not improve her symptoms we would then discuss surgical management of both her cubital tunnel as well as her lateral pain. She is agreeable to the plan. She will follow-up in 6 weeks time.   In therapy for right elbow. I have seen and evaluated the patient and agree with the above assessment and plan on today's visit. I have performed the key components of the history and physical examination with significant findings of right lateral forearm pain and ipsilateral cubital tunnel syndrome. . I concur with the findings and plan as documented.     Jenny Bartlett MD  8/25/2022

## 2022-08-31 ENCOUNTER — TELEPHONE (OUTPATIENT)
Dept: ENDOCRINOLOGY | Age: 42
End: 2022-08-31

## 2022-08-31 NOTE — TELEPHONE ENCOUNTER
The patient called to say that her blood sugars have been elevated since they made changes to her Omnipod settings in the hospital. She will come in tomorrow so that we can download her Dexcom and gonopod.

## 2022-09-02 NOTE — TELEPHONE ENCOUNTER
Called and informed pt of pump setting changes. Basal- 7AM 1.1  ISF- 40  Carb ratio- 11    Pt wrote down changes because she was walking out of the store and states she will put them in later. I told her to call in 2 weeks to report how she is doing and so we can download her pump and dexcom. Pt understood.

## 2022-09-09 ENCOUNTER — TELEPHONE (OUTPATIENT)
Dept: ENDOCRINOLOGY | Age: 42
End: 2022-09-09

## 2022-09-09 NOTE — TELEPHONE ENCOUNTER
Elsy Villagran called in and stated that her new pump settings don't seem to be bringing down her BS. She is not connected to get a download to add to this.

## 2022-09-13 DIAGNOSIS — E10.9 TYPE 1 DIABETES MELLITUS WITHOUT COMPLICATION (HCC): ICD-10-CM

## 2022-09-13 RX ORDER — INSULIN PMP CART,AUT,G6/7,CNTR
EACH SUBCUTANEOUS
Qty: 1 KIT | Refills: 0 | Status: SHIPPED | OUTPATIENT
Start: 2022-09-13

## 2022-09-15 DIAGNOSIS — E10.9 TYPE 1 DIABETES MELLITUS WITHOUT COMPLICATION (HCC): ICD-10-CM

## 2022-09-19 RX ORDER — BLOOD SUGAR DIAGNOSTIC
STRIP MISCELLANEOUS
Qty: 150 STRIP | Refills: 5 | Status: SHIPPED | OUTPATIENT
Start: 2022-09-19

## 2022-10-07 ENCOUNTER — HOSPITAL ENCOUNTER (OUTPATIENT)
Dept: GENERAL RADIOLOGY | Age: 42
Discharge: HOME | End: 2022-10-09
Payer: COMMERCIAL

## 2022-10-07 DIAGNOSIS — R92.2 DENSE BREAST TISSUE: ICD-10-CM

## 2022-10-07 DIAGNOSIS — Z80.3 FAMILY HISTORY OF BREAST CANCER IN MOTHER: ICD-10-CM

## 2022-10-07 DIAGNOSIS — Z91.89 AT HIGH RISK FOR BREAST CANCER: ICD-10-CM

## 2022-10-07 DIAGNOSIS — Z12.31 SCREENING MAMMOGRAM, ENCOUNTER FOR: ICD-10-CM

## 2022-10-07 PROCEDURE — 77063 BREAST TOMOSYNTHESIS BI: CPT

## 2022-10-11 ENCOUNTER — APPOINTMENT (OUTPATIENT)
Dept: CT IMAGING | Age: 42
End: 2022-10-11
Payer: COMMERCIAL

## 2022-10-11 ENCOUNTER — HOSPITAL ENCOUNTER (EMERGENCY)
Age: 42
Discharge: ANOTHER HOSPITAL | End: 2022-10-12
Attending: EMERGENCY MEDICINE
Payer: COMMERCIAL

## 2022-10-11 VITALS
HEART RATE: 95 BPM | HEIGHT: 64 IN | TEMPERATURE: 99.1 F | OXYGEN SATURATION: 95 % | RESPIRATION RATE: 16 BRPM | DIASTOLIC BLOOD PRESSURE: 84 MMHG | BODY MASS INDEX: 27.31 KG/M2 | WEIGHT: 160 LBS | SYSTOLIC BLOOD PRESSURE: 122 MMHG

## 2022-10-11 DIAGNOSIS — N12 PYELONEPHRITIS: Primary | ICD-10-CM

## 2022-10-11 LAB
ALBUMIN SERPL-MCNC: 4.1 G/DL (ref 3.5–5.2)
ALP BLD-CCNC: 105 U/L (ref 35–104)
ALT SERPL-CCNC: 5 U/L (ref 0–32)
ANION GAP SERPL CALCULATED.3IONS-SCNC: 13 MMOL/L (ref 7–16)
AST SERPL-CCNC: 22 U/L (ref 0–31)
BACTERIA: ABNORMAL /HPF
BASOPHILS ABSOLUTE: 0.07 E9/L (ref 0–0.2)
BASOPHILS RELATIVE PERCENT: 0.4 % (ref 0–2)
BETA-HYDROXYBUTYRATE: 0.87 MMOL/L (ref 0.02–0.27)
BILIRUB SERPL-MCNC: 1.2 MG/DL (ref 0–1.2)
BILIRUBIN URINE: NEGATIVE
BLOOD, URINE: ABNORMAL
BUN BLDV-MCNC: 12 MG/DL (ref 6–20)
CALCIUM SERPL-MCNC: 9.4 MG/DL (ref 8.6–10.2)
CHLORIDE BLD-SCNC: 93 MMOL/L (ref 98–107)
CHP ED QC CHECK: YES
CLARITY: ABNORMAL
CO2: 26 MMOL/L (ref 22–29)
COLOR: YELLOW
CREAT SERPL-MCNC: 1.3 MG/DL (ref 0.5–1)
EOSINOPHILS ABSOLUTE: 0.01 E9/L (ref 0.05–0.5)
EOSINOPHILS RELATIVE PERCENT: 0.1 % (ref 0–6)
GFR AFRICAN AMERICAN: 54
GFR NON-AFRICAN AMERICAN: 45 ML/MIN/1.73
GLUCOSE BLD-MCNC: 78 MG/DL (ref 74–99)
GLUCOSE BLD-MCNC: 81 MG/DL
GLUCOSE URINE: NEGATIVE MG/DL
HCG QUALITATIVE: NEGATIVE
HCG(URINE) PREGNANCY TEST: NEGATIVE
HCT VFR BLD CALC: 40.4 % (ref 34–48)
HEMOGLOBIN: 14.3 G/DL (ref 11.5–15.5)
IMMATURE GRANULOCYTES #: 0.08 E9/L
IMMATURE GRANULOCYTES %: 0.5 % (ref 0–5)
KETONES, URINE: 40 MG/DL
LACTIC ACID: 1.4 MMOL/L (ref 0.5–2.2)
LEUKOCYTE ESTERASE, URINE: ABNORMAL
LYMPHOCYTES ABSOLUTE: 0.79 E9/L (ref 1.5–4)
LYMPHOCYTES RELATIVE PERCENT: 5 % (ref 20–42)
MCH RBC QN AUTO: 32.3 PG (ref 26–35)
MCHC RBC AUTO-ENTMCNC: 35.4 % (ref 32–34.5)
MCV RBC AUTO: 91.2 FL (ref 80–99.9)
METER GLUCOSE: 81 MG/DL (ref 74–99)
MONOCYTES ABSOLUTE: 1.14 E9/L (ref 0.1–0.95)
MONOCYTES RELATIVE PERCENT: 7.2 % (ref 2–12)
NEUTROPHILS ABSOLUTE: 13.68 E9/L (ref 1.8–7.3)
NEUTROPHILS RELATIVE PERCENT: 86.8 % (ref 43–80)
NITRITE, URINE: POSITIVE
PDW BLD-RTO: 12.8 FL (ref 11.5–15)
PH UA: 6 (ref 5–9)
PH VENOUS: 7.36 (ref 7.35–7.45)
PLATELET # BLD: 229 E9/L (ref 130–450)
PMV BLD AUTO: 9.6 FL (ref 7–12)
POTASSIUM REFLEX MAGNESIUM: 4.2 MMOL/L (ref 3.5–5)
PROTEIN UA: 100 MG/DL
RBC # BLD: 4.43 E12/L (ref 3.5–5.5)
RBC UA: ABNORMAL /HPF (ref 0–2)
SODIUM BLD-SCNC: 132 MMOL/L (ref 132–146)
SPECIFIC GRAVITY UA: 1.02 (ref 1–1.03)
TOTAL PROTEIN: 7.6 G/DL (ref 6.4–8.3)
UROBILINOGEN, URINE: 1 E.U./DL
WBC # BLD: 15.8 E9/L (ref 4.5–11.5)
WBC UA: ABNORMAL /HPF (ref 0–5)

## 2022-10-11 PROCEDURE — 2580000003 HC RX 258

## 2022-10-11 PROCEDURE — 87186 SC STD MICRODIL/AGAR DIL: CPT

## 2022-10-11 PROCEDURE — 82962 GLUCOSE BLOOD TEST: CPT

## 2022-10-11 PROCEDURE — 74177 CT ABD & PELVIS W/CONTRAST: CPT

## 2022-10-11 PROCEDURE — 87077 CULTURE AEROBIC IDENTIFY: CPT

## 2022-10-11 PROCEDURE — 6370000000 HC RX 637 (ALT 250 FOR IP): Performed by: STUDENT IN AN ORGANIZED HEALTH CARE EDUCATION/TRAINING PROGRAM

## 2022-10-11 PROCEDURE — 82800 BLOOD PH: CPT

## 2022-10-11 PROCEDURE — 99285 EMERGENCY DEPT VISIT HI MDM: CPT

## 2022-10-11 PROCEDURE — 85025 COMPLETE CBC W/AUTO DIFF WBC: CPT

## 2022-10-11 PROCEDURE — 87088 URINE BACTERIA CULTURE: CPT

## 2022-10-11 PROCEDURE — 96361 HYDRATE IV INFUSION ADD-ON: CPT

## 2022-10-11 PROCEDURE — 82010 KETONE BODYS QUAN: CPT

## 2022-10-11 PROCEDURE — 96374 THER/PROPH/DIAG INJ IV PUSH: CPT

## 2022-10-11 PROCEDURE — 83605 ASSAY OF LACTIC ACID: CPT

## 2022-10-11 PROCEDURE — 36415 COLL VENOUS BLD VENIPUNCTURE: CPT

## 2022-10-11 PROCEDURE — 84703 CHORIONIC GONADOTROPIN ASSAY: CPT

## 2022-10-11 PROCEDURE — 80053 COMPREHEN METABOLIC PANEL: CPT

## 2022-10-11 PROCEDURE — 2580000003 HC RX 258: Performed by: STUDENT IN AN ORGANIZED HEALTH CARE EDUCATION/TRAINING PROGRAM

## 2022-10-11 PROCEDURE — 6360000002 HC RX W HCPCS

## 2022-10-11 PROCEDURE — 96375 TX/PRO/DX INJ NEW DRUG ADDON: CPT

## 2022-10-11 PROCEDURE — 81001 URINALYSIS AUTO W/SCOPE: CPT

## 2022-10-11 PROCEDURE — 6360000004 HC RX CONTRAST MEDICATION: Performed by: RADIOLOGY

## 2022-10-11 PROCEDURE — 81025 URINE PREGNANCY TEST: CPT

## 2022-10-11 RX ORDER — ONDANSETRON 2 MG/ML
4 INJECTION INTRAMUSCULAR; INTRAVENOUS ONCE
Status: COMPLETED | OUTPATIENT
Start: 2022-10-11 | End: 2022-10-11

## 2022-10-11 RX ORDER — PROCHLORPERAZINE EDISYLATE 5 MG/ML
10 INJECTION INTRAMUSCULAR; INTRAVENOUS ONCE
Status: COMPLETED | OUTPATIENT
Start: 2022-10-11 | End: 2022-10-11

## 2022-10-11 RX ORDER — 0.9 % SODIUM CHLORIDE 0.9 %
1000 INTRAVENOUS SOLUTION INTRAVENOUS ONCE
Status: COMPLETED | OUTPATIENT
Start: 2022-10-11 | End: 2022-10-11

## 2022-10-11 RX ORDER — IBUPROFEN 600 MG/1
600 TABLET ORAL ONCE
Status: COMPLETED | OUTPATIENT
Start: 2022-10-11 | End: 2022-10-11

## 2022-10-11 RX ORDER — ACETAMINOPHEN 500 MG
1000 TABLET ORAL ONCE
Status: COMPLETED | OUTPATIENT
Start: 2022-10-11 | End: 2022-10-11

## 2022-10-11 RX ADMIN — CEFTRIAXONE 1000 MG: 1 INJECTION, POWDER, FOR SOLUTION INTRAMUSCULAR; INTRAVENOUS at 18:22

## 2022-10-11 RX ADMIN — ONDANSETRON 4 MG: 2 INJECTION INTRAMUSCULAR; INTRAVENOUS at 16:44

## 2022-10-11 RX ADMIN — IOPAMIDOL 50 ML: 755 INJECTION, SOLUTION INTRAVENOUS at 18:06

## 2022-10-11 RX ADMIN — ACETAMINOPHEN 1000 MG: 500 TABLET ORAL at 19:24

## 2022-10-11 RX ADMIN — IBUPROFEN 600 MG: 600 TABLET ORAL at 21:22

## 2022-10-11 RX ADMIN — SODIUM CHLORIDE 1000 ML: 9 INJECTION, SOLUTION INTRAVENOUS at 16:43

## 2022-10-11 RX ADMIN — SODIUM CHLORIDE 1000 ML: 9 INJECTION, SOLUTION INTRAVENOUS at 21:17

## 2022-10-11 RX ADMIN — PROCHLORPERAZINE EDISYLATE 10 MG: 5 INJECTION INTRAMUSCULAR; INTRAVENOUS at 16:44

## 2022-10-11 ASSESSMENT — ENCOUNTER SYMPTOMS
SHORTNESS OF BREATH: 0
DIARRHEA: 0
ABDOMINAL PAIN: 0
RHINORRHEA: 0
WHEEZING: 0
BACK PAIN: 1
NAUSEA: 1
VOMITING: 0
TROUBLE SWALLOWING: 0
COUGH: 0
VOICE CHANGE: 0
SORE THROAT: 0
PHOTOPHOBIA: 0

## 2022-10-11 ASSESSMENT — PAIN - FUNCTIONAL ASSESSMENT: PAIN_FUNCTIONAL_ASSESSMENT: 0-10

## 2022-10-11 ASSESSMENT — PAIN SCALES - GENERAL
PAINLEVEL_OUTOF10: 4
PAINLEVEL_OUTOF10: 4
PAINLEVEL_OUTOF10: 6
PAINLEVEL_OUTOF10: 4

## 2022-10-11 NOTE — ED PROVIDER NOTES
36y.o. year old female presenting to the emergency room with concerns of dysuria. Reports that symptom's onset sunday. Worsen with urination. Improves with nothing. Severity of 6, with radiation along left flank. Symptoms are constant in timing. Symptoms described as flank pain with chills, fatigue, nausea, dysuria, urgency and frequency. associated symptoms of none. Patient in  no acute distress. Patient with previous history of urinary tract infection 3 weeks prior, was treated with an antibiotic by her PCP. Patient states that symptoms initially improved however began to recur on Sunday. Chief Complaint   Patient presents with    Urinary Tract Infection     PT has been treated for a UTI and has continuing back pain and uti symptoms. Review of Systems   Constitutional:  Positive for chills, fatigue and fever. HENT:  Negative for congestion, rhinorrhea, sore throat, trouble swallowing and voice change. Eyes:  Negative for photophobia and visual disturbance. Respiratory:  Negative for cough, shortness of breath and wheezing. Cardiovascular:  Negative for chest pain and palpitations. Gastrointestinal:  Positive for nausea. Negative for abdominal pain, diarrhea and vomiting. Genitourinary:  Positive for dysuria, flank pain, frequency, hematuria and urgency. Musculoskeletal:  Positive for back pain. Negative for arthralgias, neck pain and neck stiffness. Skin:  Negative for rash and wound. Neurological:  Positive for headaches. Negative for dizziness, syncope, weakness and numbness. Psychiatric/Behavioral:  Negative for behavioral problems and confusion. The patient is nervous/anxious. Physical Exam  Vitals reviewed. Constitutional:       General: She is not in acute distress. Appearance: Normal appearance. She is diaphoretic. She is not ill-appearing. HENT:      Head: Normocephalic.       Right Ear: External ear normal.      Left Ear: External ear normal.      Nose: Nose normal.      Mouth/Throat:      Pharynx: Oropharynx is clear. Eyes:      General: No scleral icterus. Right eye: No discharge. Left eye: No discharge. Conjunctiva/sclera: Conjunctivae normal.      Pupils: Pupils are equal, round, and reactive to light. Cardiovascular:      Rate and Rhythm: Regular rhythm. Tachycardia present. Pulses: Normal pulses. Heart sounds: No friction rub. No gallop. Pulmonary:      Effort: Pulmonary effort is normal. No respiratory distress. Breath sounds: No stridor. No rhonchi or rales. Abdominal:      General: There is no distension. Tenderness: There is no abdominal tenderness. There is left CVA tenderness. There is no right CVA tenderness, guarding or rebound. Musculoskeletal:         General: Tenderness present. No deformity. Cervical back: Normal range of motion and neck supple. No rigidity or tenderness. Right lower leg: No edema. Left lower leg: No edema. Skin:     General: Skin is warm. Coloration: Skin is not jaundiced. Findings: No erythema or rash. Neurological:      Mental Status: She is alert and oriented to person, place, and time. Sensory: No sensory deficit. Motor: No weakness. Psychiatric:         Mood and Affect: Mood normal.         Behavior: Behavior normal.        Procedures     CT ABDOMEN PELVIS W IV CONTRAST Additional Contrast? None    (Results Pending)       MDM  Number of Diagnoses or Management Options  Pyelonephritis  Diagnosis management comments: 36year old female presenting to the ER with complaints of dysuria, frequency, urgency flank and back pain, fever, nausea. Patient previously with UTI 3 weeks prior and initial resolution of symptoms on macrobid. Patient with tenderness on left flank. Fever noted. UA + for infection CT with pyelonephritis. Spoke to hospitialist, discussed patient, will accept patient for transfer to [place] for admission.  Started on rocephin. Amount and/or Complexity of Data Reviewed  Decide to obtain previous medical records or to obtain history from someone other than the patient: yes         ED Course as of 10/12/22 1756   Wed Oct 12, 2022   0123 Discussed with [facility], hospitalist determined that patient to go to [facility] service, spoke to [facility], discussed patient patient now accepted under Dr. Eliel Keenan at this time. Patient already on route via transport to [facility]. [ERWIN]      ED Course User Index  [ERWIN] Dalila Lewis DO        ED Course as of 10/12/22 1758   Wed Oct 12, 2022   0123 Discussed with [facility], hospitalist determined that patient to go to [facility] service, spoke to [facility], discussed patient patient now accepted under Dr. Eliel Keenan at this time. Patient already on route via transport to [facility]. [ERWIN]      ED Course User Index  [ERWIN] Dalila Lewis DO       --------------------------------------------- PAST HISTORY ---------------------------------------------  Past Medical History:  has a past medical history of Anxiety, Depression, Diabetes mellitus type 1, controlled, without complications ([facility] [address] Epicondylitis, lateral, GERD (gastroesophageal reflux disease), Hypertension, IBS (irritable bowel syndrome), Insulin pump status, SIRS (systemic inflammatory response syndrome) ([facility] [address] Thyroid disease, and Trigger finger. Past Surgical History:  has a past surgical history that includes pr  delivery only (x2); skin biopsy; Endoscopy, colon, diagnostic; Carpal tunnel release (); laparoscopy (); Finger trigger release; Finger trigger release (Right, 2016); Wound debridement (Right, 2017); and Finger trigger release (Right, 4/3/2019). Social History:  reports that she has never smoked. She has never used smokeless tobacco. She reports current alcohol use. She reports that she does not use drugs.     Family History: family history includes Breast Cancer in her mother; Cancer in her mother; High Blood Pressure in her maternal grandmother. The patients home medications have been reviewed.     Allergies: Penicillins and Sulfa antibiotics    -------------------------------------------------- RESULTS -------------------------------------------------    LABS:  Results for orders placed or performed during the hospital encounter of 10/11/22   CBC with Auto Differential   Result Value Ref Range    WBC 15.8 (H) 4.5 - 11.5 E9/L    RBC 4.43 3.50 - 5.50 E12/L    Hemoglobin 14.3 11.5 - 15.5 g/dL    Hematocrit 40.4 34.0 - 48.0 %    MCV 91.2 80.0 - 99.9 fL    MCH 32.3 26.0 - 35.0 pg    MCHC 35.4 (H) 32.0 - 34.5 %    RDW 12.8 11.5 - 15.0 fL    Platelets 700 670 - 932 E9/L    MPV 9.6 7.0 - 12.0 fL    Neutrophils % 86.8 (H) 43.0 - 80.0 %    Immature Granulocytes % 0.5 0.0 - 5.0 %    Lymphocytes % 5.0 (L) 20.0 - 42.0 %    Monocytes % 7.2 2.0 - 12.0 %    Eosinophils % 0.1 0.0 - 6.0 %    Basophils % 0.4 0.0 - 2.0 %    Neutrophils Absolute 13.68 (H) 1.80 - 7.30 E9/L    Immature Granulocytes # 0.08 E9/L    Lymphocytes Absolute 0.79 (L) 1.50 - 4.00 E9/L    Monocytes Absolute 1.14 (H) 0.10 - 0.95 E9/L    Eosinophils Absolute 0.01 (L) 0.05 - 0.50 E9/L    Basophils Absolute 0.07 0.00 - 0.20 E9/L   Comprehensive Metabolic Panel w/ Reflex to MG   Result Value Ref Range    Sodium 132 132 - 146 mmol/L    Potassium reflex Magnesium 4.2 3.5 - 5.0 mmol/L    Chloride 93 (L) 98 - 107 mmol/L    CO2 26 22 - 29 mmol/L    Anion Gap 13 7 - 16 mmol/L    Glucose 78 74 - 99 mg/dL    BUN 12 6 - 20 mg/dL    Creatinine 1.3 (H) 0.5 - 1.0 mg/dL    GFR Non-African American 45 >=60 mL/min/1.73    GFR African American 54     Calcium 9.4 8.6 - 10.2 mg/dL    Total Protein 7.6 6.4 - 8.3 g/dL    Albumin 4.1 3.5 - 5.2 g/dL    Total Bilirubin 1.2 0.0 - 1.2 mg/dL    Alkaline Phosphatase 105 (H) 35 - 104 U/L    ALT 5 0 - 32 U/L    AST 22 0 - 31 U/L   HCG Qualitative, Serum   Result Value Ref Range    hCG Qual NEGATIVE NEGATIVE   Urinalysis with Microscopic Result Value Ref Range    Color, UA Yellow Straw/Yellow    Clarity, UA CLOUDY (A) Clear    Glucose, Ur Negative Negative mg/dL    Bilirubin Urine Negative Negative    Ketones, Urine 40 (A) Negative mg/dL    Specific Gravity, UA 1.020 1.005 - 1.030    Blood, Urine MODERATE (A) Negative    pH, UA 6.0 5.0 - 9.0    Protein,  (A) Negative mg/dL    Urobilinogen, Urine 1.0 <2.0 E.U./dL    Nitrite, Urine POSITIVE (A) Negative    Leukocyte Esterase, Urine LARGE (A) Negative    WBC, UA PACKED (A) 0 - 5 /HPF    RBC, UA 5-10 (A) 0 - 2 /HPF    Bacteria, UA MODERATE (A) None Seen /HPF   Pregnancy, Urine   Result Value Ref Range    HCG(Urine) Pregnancy Test NEGATIVE NEGATIVE   Lactic Acid   Result Value Ref Range    Lactic Acid 1.4 0.5 - 2.2 mmol/L   Beta-Hydroxybutyrate   Result Value Ref Range    Beta-Hydroxybutyrate 0.87 (H) 0.02 - 0.27 mmol/L   PH, VENOUS   Result Value Ref Range    pH, Cosme 7.36 7.35 - 7.45   POCT Glucose   Result Value Ref Range    Glucose 81 mg/dL    QC OK? yes    POCT Glucose   Result Value Ref Range    Meter Glucose 81 74 - 99 mg/dL       RADIOLOGY:  CT ABDOMEN PELVIS W IV CONTRAST Additional Contrast? None   Final Result   1. Left pyelonephritis (inhomogeneous enhancement of the left kidney with   perinephric edema). No evidence of abscess, urolithiasis or hydronephrosis. 2. Moderate fecal retention in the colon, worse proximally. No evidence of   bowel wall thickening or obstruction. 3. Small pericardial effusion. Trace pleural effusions. No ascites. ------------------------- NURSING NOTES AND VITALS REVIEWED ---------------------------  Date / Time Roomed:  10/11/2022  3:47 PM  ED Bed Assignment:  NAMITA/NAMITA    The nursing notes within the ED encounter and vital signs as below have been reviewed.      Patient Vitals for the past 24 hrs:   BP Temp Temp src Pulse Resp SpO2   10/11/22 2341 122/84 99.1 °F (37.3 °C) Oral 95 16 95 %   10/11/22 2232 (!) 107/56 99.2 °F (37.3 °C) Oral (!) 110 18 93 %   10/11/22 2116 116/63 (!) 102.2 °F (39 °C) -- (!) 116 20 94 %   10/11/22 1911 125/60 (!) 101.1 °F (38.4 °C) Oral (!) 129 18 94 %       Oxygen Saturation Interpretation: Normal    ------------------------------------------ PROGRESS NOTES ------------------------------------------  Re-evaluation(s):  Time: PAtients symptoms show no change  Repeat physical examination is not changed    Counseling:  I have spoken with the patient and discussed todays results, in addition to providing specific details for the plan of care and counseling regarding the diagnosis and prognosis. Their questions are answered at this time and they are agreeable with the plan of admission.    --------------------------------- ADDITIONAL PROVIDER NOTES ---------------------------------  Consultations:  Spoke with Dr. Susan Laen and [facility]. Discussed case. They will admit the patient. This patient's ED course included: a personal history and physicial examination, re-evaluation prior to disposition, multiple bedside re-evaluations, IV medications, cardiac monitoring, and continuous pulse oximetry    This patient has remained hemodynamically stable during their ED course. Diagnosis:  1. Pyelonephritis        Disposition:  Patient's disposition: Admit   Patient's condition is stable. Attending was present and available throughout encounter including all critical portions;  See Attending Note/Attestation for Final [facility],   Resident  10/12/22 0850

## 2022-10-12 ENCOUNTER — HOSPITAL ENCOUNTER (INPATIENT)
Age: 42
LOS: 2 days | Discharge: HOME | DRG: 872 | End: 2022-10-14
Attending: STUDENT IN AN ORGANIZED HEALTH CARE EDUCATION/TRAINING PROGRAM | Admitting: INTERNAL MEDICINE
Payer: COMMERCIAL

## 2022-10-12 PROBLEM — N12 PYELONEPHRITIS: Status: ACTIVE | Noted: 2022-10-12

## 2022-10-12 LAB
ANION GAP SERPL CALCULATED.3IONS-SCNC: 11 MMOL/L (ref 7–16)
BASOPHILS ABSOLUTE: 0 E9/L (ref 0–0.2)
BASOPHILS RELATIVE PERCENT: 0 % (ref 0–2)
BUN BLDV-MCNC: 11 MG/DL (ref 6–20)
BURR CELLS: ABNORMAL
CALCIUM SERPL-MCNC: 8.4 MG/DL (ref 8.6–10.2)
CHLORIDE BLD-SCNC: 98 MMOL/L (ref 98–107)
CO2: 21 MMOL/L (ref 22–29)
CREAT SERPL-MCNC: 1.1 MG/DL (ref 0.5–1)
EOSINOPHILS ABSOLUTE: 0 E9/L (ref 0.05–0.5)
EOSINOPHILS RELATIVE PERCENT: 0 % (ref 0–6)
GFR AFRICAN AMERICAN: >60
GFR NON-AFRICAN AMERICAN: 54 ML/MIN/1.73
GLUCOSE BLD-MCNC: 158 MG/DL (ref 74–99)
HCT VFR BLD CALC: 36.2 % (ref 34–48)
HEMOGLOBIN: 12.3 G/DL (ref 11.5–15.5)
LYMPHOCYTES ABSOLUTE: 0.71 E9/L (ref 1.5–4)
LYMPHOCYTES RELATIVE PERCENT: 5.2 % (ref 20–42)
MCH RBC QN AUTO: 31.5 PG (ref 26–35)
MCHC RBC AUTO-ENTMCNC: 34 % (ref 32–34.5)
MCV RBC AUTO: 92.8 FL (ref 80–99.9)
MONOCYTES ABSOLUTE: 0.85 E9/L (ref 0.1–0.95)
MONOCYTES RELATIVE PERCENT: 6.1 % (ref 2–12)
NEUTROPHILS ABSOLUTE: 12.64 E9/L (ref 1.8–7.3)
NEUTROPHILS RELATIVE PERCENT: 88.7 % (ref 43–80)
NUCLEATED RED BLOOD CELLS: 0 /100 WBC
OVALOCYTES: ABNORMAL
PDW BLD-RTO: 13 FL (ref 11.5–15)
PLATELET # BLD: 191 E9/L (ref 130–450)
PMV BLD AUTO: 9.3 FL (ref 7–12)
POIKILOCYTES: ABNORMAL
POLYCHROMASIA: ABNORMAL
POTASSIUM SERPL-SCNC: 3.8 MMOL/L (ref 3.5–5)
RBC # BLD: 3.9 E12/L (ref 3.5–5.5)
SODIUM BLD-SCNC: 130 MMOL/L (ref 132–146)
VACUOLATED NEUTROPHILS: ABNORMAL
WBC # BLD: 14.2 E9/L (ref 4.5–11.5)

## 2022-10-12 PROCEDURE — 6370000000 HC RX 637 (ALT 250 FOR IP): Performed by: INTERNAL MEDICINE

## 2022-10-12 PROCEDURE — 36415 COLL VENOUS BLD VENIPUNCTURE: CPT

## 2022-10-12 PROCEDURE — 2580000003 HC RX 258: Performed by: FAMILY MEDICINE

## 2022-10-12 PROCEDURE — 6370000000 HC RX 637 (ALT 250 FOR IP): Performed by: FAMILY MEDICINE

## 2022-10-12 PROCEDURE — 80048 BASIC METABOLIC PNL TOTAL CA: CPT

## 2022-10-12 PROCEDURE — 6360000002 HC RX W HCPCS: Performed by: STUDENT IN AN ORGANIZED HEALTH CARE EDUCATION/TRAINING PROGRAM

## 2022-10-12 PROCEDURE — 2060000000 HC ICU INTERMEDIATE R&B

## 2022-10-12 PROCEDURE — 6360000002 HC RX W HCPCS: Performed by: FAMILY MEDICINE

## 2022-10-12 PROCEDURE — 2580000003 HC RX 258: Performed by: STUDENT IN AN ORGANIZED HEALTH CARE EDUCATION/TRAINING PROGRAM

## 2022-10-12 PROCEDURE — 85025 COMPLETE CBC W/AUTO DIFF WBC: CPT

## 2022-10-12 RX ORDER — SODIUM CHLORIDE 9 MG/ML
INJECTION, SOLUTION INTRAVENOUS PRN
Status: DISCONTINUED | OUTPATIENT
Start: 2022-10-12 | End: 2022-10-14 | Stop reason: HOSPADM

## 2022-10-12 RX ORDER — ACETAMINOPHEN 650 MG/1
650 SUPPOSITORY RECTAL EVERY 6 HOURS PRN
Status: DISCONTINUED | OUTPATIENT
Start: 2022-10-12 | End: 2022-10-14 | Stop reason: HOSPADM

## 2022-10-12 RX ORDER — LOSARTAN POTASSIUM 50 MG/1
50 TABLET ORAL DAILY
Status: DISCONTINUED | OUTPATIENT
Start: 2022-10-12 | End: 2022-10-14 | Stop reason: HOSPADM

## 2022-10-12 RX ORDER — DEXTROSE MONOHYDRATE 100 MG/ML
INJECTION, SOLUTION INTRAVENOUS CONTINUOUS PRN
Status: DISCONTINUED | OUTPATIENT
Start: 2022-10-12 | End: 2022-10-14 | Stop reason: HOSPADM

## 2022-10-12 RX ORDER — TRAZODONE HYDROCHLORIDE 50 MG/1
100 TABLET ORAL NIGHTLY
Status: DISCONTINUED | OUTPATIENT
Start: 2022-10-12 | End: 2022-10-14 | Stop reason: HOSPADM

## 2022-10-12 RX ORDER — LEVOTHYROXINE SODIUM 0.1 MG/1
100 TABLET ORAL DAILY
Status: DISCONTINUED | OUTPATIENT
Start: 2022-10-12 | End: 2022-10-14 | Stop reason: HOSPADM

## 2022-10-12 RX ORDER — SODIUM CHLORIDE 0.9 % (FLUSH) 0.9 %
5-40 SYRINGE (ML) INJECTION PRN
Status: DISCONTINUED | OUTPATIENT
Start: 2022-10-12 | End: 2022-10-14 | Stop reason: HOSPADM

## 2022-10-12 RX ORDER — ESOMEPRAZOLE MAGNESIUM 40 MG/1
40 FOR SUSPENSION ORAL DAILY
Status: DISCONTINUED | OUTPATIENT
Start: 2022-10-12 | End: 2022-10-12 | Stop reason: CLARIF

## 2022-10-12 RX ORDER — ONDANSETRON 2 MG/ML
4 INJECTION INTRAMUSCULAR; INTRAVENOUS EVERY 6 HOURS PRN
Status: DISCONTINUED | OUTPATIENT
Start: 2022-10-12 | End: 2022-10-14 | Stop reason: HOSPADM

## 2022-10-12 RX ORDER — LACTULOSE 10 G/15ML
20 SOLUTION ORAL 2 TIMES DAILY
Status: DISCONTINUED | OUTPATIENT
Start: 2022-10-12 | End: 2022-10-14 | Stop reason: HOSPADM

## 2022-10-12 RX ORDER — HYDROCODONE BITARTRATE AND ACETAMINOPHEN 5; 325 MG/1; MG/1
1 TABLET ORAL EVERY 6 HOURS PRN
Status: DISCONTINUED | OUTPATIENT
Start: 2022-10-12 | End: 2022-10-14 | Stop reason: HOSPADM

## 2022-10-12 RX ORDER — ATORVASTATIN CALCIUM 40 MG/1
40 TABLET, FILM COATED ORAL NIGHTLY
Status: DISCONTINUED | OUTPATIENT
Start: 2022-10-12 | End: 2022-10-14 | Stop reason: HOSPADM

## 2022-10-12 RX ORDER — ACETAMINOPHEN 325 MG/1
650 TABLET ORAL EVERY 6 HOURS PRN
Status: DISCONTINUED | OUTPATIENT
Start: 2022-10-12 | End: 2022-10-14 | Stop reason: HOSPADM

## 2022-10-12 RX ORDER — SODIUM CHLORIDE 0.9 % (FLUSH) 0.9 %
5-40 SYRINGE (ML) INJECTION EVERY 12 HOURS SCHEDULED
Status: DISCONTINUED | OUTPATIENT
Start: 2022-10-12 | End: 2022-10-14 | Stop reason: HOSPADM

## 2022-10-12 RX ORDER — PANTOPRAZOLE SODIUM 40 MG/1
40 TABLET, DELAYED RELEASE ORAL
Status: DISCONTINUED | OUTPATIENT
Start: 2022-10-12 | End: 2022-10-14 | Stop reason: HOSPADM

## 2022-10-12 RX ORDER — ONDANSETRON 4 MG/1
4 TABLET, ORALLY DISINTEGRATING ORAL EVERY 8 HOURS PRN
Status: DISCONTINUED | OUTPATIENT
Start: 2022-10-12 | End: 2022-10-14 | Stop reason: HOSPADM

## 2022-10-12 RX ORDER — SODIUM CHLORIDE 9 MG/ML
INJECTION, SOLUTION INTRAVENOUS CONTINUOUS
Status: DISCONTINUED | OUTPATIENT
Start: 2022-10-12 | End: 2022-10-14 | Stop reason: HOSPADM

## 2022-10-12 RX ORDER — GABAPENTIN 300 MG/1
300 CAPSULE ORAL 2 TIMES DAILY
Status: DISCONTINUED | OUTPATIENT
Start: 2022-10-12 | End: 2022-10-14 | Stop reason: HOSPADM

## 2022-10-12 RX ORDER — DEXTROAMPHETAMINE SACCHARATE, AMPHETAMINE ASPARTATE MONOHYDRATE, DEXTROAMPHETAMINE SULFATE AND AMPHETAMINE SULFATE 5; 5; 5; 5 MG/1; MG/1; MG/1; MG/1
20 CAPSULE, EXTENDED RELEASE ORAL DAILY
Status: DISCONTINUED | OUTPATIENT
Start: 2022-10-12 | End: 2022-10-14 | Stop reason: HOSPADM

## 2022-10-12 RX ORDER — DULOXETIN HYDROCHLORIDE 60 MG/1
60 CAPSULE, DELAYED RELEASE ORAL 2 TIMES DAILY
Status: DISCONTINUED | OUTPATIENT
Start: 2022-10-12 | End: 2022-10-14 | Stop reason: HOSPADM

## 2022-10-12 RX ORDER — DEXTROAMPHETAMINE SULFATE, DEXTROAMPHETAMINE SACCHARATE, AMPHETAMINE SULFATE AND AMPHETAMINE ASPARTATE 5; 5; 5; 5 MG/1; MG/1; MG/1; MG/1
20 CAPSULE, EXTENDED RELEASE ORAL DAILY
COMMUNITY
Start: 2022-09-27

## 2022-10-12 RX ORDER — INSULIN LISPRO 100 [IU]/ML
0-10 INJECTION, SOLUTION INTRAVENOUS; SUBCUTANEOUS
Status: DISCONTINUED | OUTPATIENT
Start: 2022-10-12 | End: 2022-10-14 | Stop reason: HOSPADM

## 2022-10-12 RX ORDER — HYDROXYCHLOROQUINE SULFATE 200 MG/1
200 TABLET, FILM COATED ORAL 2 TIMES DAILY
Status: DISCONTINUED | OUTPATIENT
Start: 2022-10-12 | End: 2022-10-14 | Stop reason: HOSPADM

## 2022-10-12 RX ORDER — ENOXAPARIN SODIUM 100 MG/ML
40 INJECTION SUBCUTANEOUS DAILY
Status: DISCONTINUED | OUTPATIENT
Start: 2022-10-12 | End: 2022-10-14 | Stop reason: HOSPADM

## 2022-10-12 RX ORDER — ARIPIPRAZOLE 2 MG/1
2 TABLET ORAL DAILY
Status: DISCONTINUED | OUTPATIENT
Start: 2022-10-12 | End: 2022-10-14 | Stop reason: HOSPADM

## 2022-10-12 RX ORDER — BUSPIRONE HYDROCHLORIDE 15 MG/1
15 TABLET ORAL 2 TIMES DAILY
Status: DISCONTINUED | OUTPATIENT
Start: 2022-10-12 | End: 2022-10-12 | Stop reason: SDUPTHER

## 2022-10-12 RX ADMIN — HYDROXYCHLOROQUINE SULFATE 200 MG: 200 TABLET ORAL at 08:50

## 2022-10-12 RX ADMIN — SODIUM CHLORIDE: 9 INJECTION, SOLUTION INTRAVENOUS at 17:58

## 2022-10-12 RX ADMIN — SODIUM CHLORIDE: 9 INJECTION, SOLUTION INTRAVENOUS at 01:53

## 2022-10-12 RX ADMIN — BUSPIRONE HYDROCHLORIDE 15 MG: 10 TABLET ORAL at 20:08

## 2022-10-12 RX ADMIN — BUSPIRONE HYDROCHLORIDE 15 MG: 10 TABLET ORAL at 08:50

## 2022-10-12 RX ADMIN — DULOXETINE HYDROCHLORIDE 60 MG: 60 CAPSULE, DELAYED RELEASE ORAL at 20:08

## 2022-10-12 RX ADMIN — GABAPENTIN 300 MG: 300 CAPSULE ORAL at 20:08

## 2022-10-12 RX ADMIN — DULOXETINE HYDROCHLORIDE 60 MG: 60 CAPSULE, DELAYED RELEASE ORAL at 08:51

## 2022-10-12 RX ADMIN — CEFEPIME 2000 MG: 2 INJECTION, POWDER, FOR SOLUTION INTRAVENOUS at 18:00

## 2022-10-12 RX ADMIN — TRAZODONE HYDROCHLORIDE 100 MG: 50 TABLET ORAL at 22:22

## 2022-10-12 RX ADMIN — HYDROCODONE BITARTRATE AND ACETAMINOPHEN 1 TABLET: 5; 325 TABLET ORAL at 17:45

## 2022-10-12 RX ADMIN — LEVOTHYROXINE SODIUM 100 MCG: 100 TABLET ORAL at 06:10

## 2022-10-12 RX ADMIN — HYDROXYCHLOROQUINE SULFATE 200 MG: 200 TABLET ORAL at 20:30

## 2022-10-12 RX ADMIN — ACETAMINOPHEN 650 MG: 325 TABLET ORAL at 18:57

## 2022-10-12 RX ADMIN — GABAPENTIN 300 MG: 300 CAPSULE ORAL at 08:51

## 2022-10-12 RX ADMIN — LOSARTAN POTASSIUM 50 MG: 50 TABLET, FILM COATED ORAL at 08:51

## 2022-10-12 RX ADMIN — ARIPIPRAZOLE 2 MG: 2 TABLET ORAL at 08:51

## 2022-10-12 RX ADMIN — HYDROCODONE BITARTRATE AND ACETAMINOPHEN 1 TABLET: 5; 325 TABLET ORAL at 11:23

## 2022-10-12 RX ADMIN — SODIUM CHLORIDE, PRESERVATIVE FREE 10 ML: 5 INJECTION INTRAVENOUS at 08:51

## 2022-10-12 RX ADMIN — PANTOPRAZOLE SODIUM 40 MG: 40 TABLET, DELAYED RELEASE ORAL at 06:10

## 2022-10-12 RX ADMIN — LACTULOSE 20 G: 20 SOLUTION ORAL at 09:04

## 2022-10-12 RX ADMIN — CEFEPIME 2000 MG: 2 INJECTION, POWDER, FOR SOLUTION INTRAVENOUS at 06:10

## 2022-10-12 RX ADMIN — ATORVASTATIN CALCIUM 40 MG: 40 TABLET, FILM COATED ORAL at 20:08

## 2022-10-12 ASSESSMENT — PAIN - FUNCTIONAL ASSESSMENT
PAIN_FUNCTIONAL_ASSESSMENT: ACTIVITIES ARE NOT PREVENTED
PAIN_FUNCTIONAL_ASSESSMENT: ACTIVITIES ARE NOT PREVENTED

## 2022-10-12 ASSESSMENT — PAIN SCALES - GENERAL
PAINLEVEL_OUTOF10: 0
PAINLEVEL_OUTOF10: 5
PAINLEVEL_OUTOF10: 0
PAINLEVEL_OUTOF10: 6

## 2022-10-12 ASSESSMENT — PAIN DESCRIPTION - DESCRIPTORS
DESCRIPTORS: ACHING
DESCRIPTORS: ACHING

## 2022-10-12 ASSESSMENT — PAIN DESCRIPTION - ORIENTATION: ORIENTATION: LEFT

## 2022-10-12 ASSESSMENT — PAIN DESCRIPTION - ONSET
ONSET: GRADUAL
ONSET: ON-GOING

## 2022-10-12 ASSESSMENT — PAIN DESCRIPTION - FREQUENCY
FREQUENCY: CONTINUOUS
FREQUENCY: CONTINUOUS

## 2022-10-12 ASSESSMENT — PAIN DESCRIPTION - PAIN TYPE
TYPE: ACUTE PAIN
TYPE: ACUTE PAIN

## 2022-10-12 ASSESSMENT — PAIN DESCRIPTION - LOCATION
LOCATION: FLANK
LOCATION: GENERALIZED;HEAD

## 2022-10-12 ASSESSMENT — PAIN DESCRIPTION - DIRECTION
RADIATING_TOWARDS: NON-RADIATING
RADIATING_TOWARDS: NON-RADIATING

## 2022-10-12 NOTE — PROGRESS NOTES
Dr. Delia Barrios MD,    Your patient is on a medication that requires a renal dose adjustment. Renal Function Assessment:    Date Body Weight IBW  Adjusted BW SCr  CrCl Dialysis status   10/12/2022 166 lb (75.3 kg)  Ideal body weight: 54.7 kg (120 lb 9.5 oz)  Adjusted ideal body weight: 62.9 kg (138 lb 12.1 oz) Serum creatinine: 1.3 mg/dL (H) 10/11/22 1650  Estimated creatinine clearance: 55 mL/min (A) N/a       Pharmacy has renally dose-adjusted the following medication(s):    Date Original Order Renally Adjusted Order   10/12/2022 Cefepime 2 grams q12h Cefepime 2 grams q24h       These changes were made per protocol according to the [facility] Renal Function-Based Dose Adjustments Policy    *Please note this dose may need readjusted if your patient's renal function significantly improves. Please contact pharmacy with any questions regarding these changes.     CHARISSE Wilder [facility]  10/12/2022  6:27 AM

## 2022-10-12 NOTE — PROGRESS NOTES
Occupational Therapy        Occupational Therapy referral received  No acute OT needs at this time   OT order discontinued

## 2022-10-12 NOTE — CARE COORDINATION
Introduced myself and provided explanation of CM role to patient w/initial assessment completed. Pt resides w/spouse and children in a two story home w/four steps to enter. Completes her adls w/independence. No DMEs, O2, neb or cpap. Pt still drives and is established w/Dr. Lord Hogue and pharmacy is [facility] in [place].  will transport home upon dc, will follow.    Adama Julian, BSN, RN  [facility] Case Management  [phone]

## 2022-10-12 NOTE — PROGRESS NOTES
Otoniel Villarreal was ordered amphetamine-dextroamphetamine (ADDERALL XR) which is a nonformulary medication. This medication will need to be supplied by the patient as the pharmacy does not carry this non-formulary medication. If the medication has not been administered by 1400 on the following day from the time the order was placed, a pharmacist will follow-up with the nurse of the patient to assess the capability of the patient to bring in the medication. If it is determined that the patient cannot supply the medication and it is not available to be dispensed from the pharmacy, the provider will be notified.   Lisa Vickers, [address] [place] [address]  10/12/2022  1:38 AM

## 2022-10-12 NOTE — CONSULTS
[address] [place] [address] [facility]    Consultation Note     Admit Date: 10/12/2022 12:58 AM    Reason for Consult:   pyelonephritis    Attending Physician:  Susette Bosworth, DO     Chief Complaint: left sided back pain    HISTORY OF PRESENT ILLNESS:   The patient is a 36 y.o.  female known to the Infectious Diseases service. The patient has hx of DM which is uncontrolled, presented to PCP 10days ago with burning urination and pt was given macrobid for 10 days but she kept getting worse. Pt went to [facility] ER with burning urination, and left flank pain. She had chills/nausea. Pt transferred here for further management. Since admission, pt is afebrile, HS stable, labs showed leucocytosis of 15, hgb of 14, platelet count of 791. Lfts showed mild alk phos elevation, Bhydroxy butyrate was positive. BMP showed cr 1.1/BUN 11. UA showed pyuria. Urine cx in process. CT A/p showed left sided pyelonephritis. Patient is on Cefepime and I got consulted for further recommendations. Past Medical History:      2016: pt was seen for leucocytosis while in DKA but there was no sign of infection.          Diagnosis Date    Anxiety     Depression     Diabetes mellitus type 1, controlled, without complications ([facility] [address] 3/6/3809    Epicondylitis, lateral 02/2017    right    GERD (gastroesophageal reflux disease)     Hypertension     IBS (irritable bowel syndrome)     Insulin pump status has insulin pump    follows with Dr. Fide Blackburn    SIRS (systemic inflammatory response syndrome) ([facility] [address] 1/8/2016    Thyroid disease     Trigger finger     right index and little finger     Past Surgical History:        Procedure Laterality Date    CARPAL TUNNEL RELEASE  2011    left hand     DEBRIDEMENT Right 02/14/2017    Right lateral epicondylar debridement    ENDOSCOPY, COLON, DIAGNOSTIC      FINGER TRIGGER RELEASE      middle finger bilat, and right thumb  / multiple  / last one 2017    FINGER TRIGGER RELEASE Right 2016    4th finger    FINGER TRIGGER RELEASE Right 4/3/2019    TRIGGER RELEASE RIGHT INDEX AND SMALL FINGER performed by Jackelyn Fuentes MD at [address] [place] [address]  2009    exploratory     [place]  DELIVERY ONLY  x2    , Low Cervical    SKIN BIOPSY       Current Medications:   Scheduled Meds:   amphetamine-dextroamphetamine  20 mg Oral Daily    ARIPiprazole  2 mg Oral Daily    atorvastatin  40 mg Oral Nightly    DULoxetine  60 mg Oral BID    gabapentin  300 mg Oral BID    hydroxychloroquine  200 mg Oral BID    levothyroxine  100 mcg Oral Daily    losartan  50 mg Oral Daily    traZODone  100 mg Oral Nightly    sodium chloride flush  5-40 mL IntraVENous 2 times per day    enoxaparin  40 mg SubCUTAneous Daily    busPIRone  15 mg Oral BID    pantoprazole  40 mg Oral QAM AC    [START ON 10/13/2022] cefepime  2,000 mg IntraVENous Q24H    lactulose  20 g Oral BID     Continuous Infusions:   sodium chloride 75 mL/hr at 10/12/22 0153    sodium chloride       PRN Meds:sodium chloride flush, sodium chloride, ondansetron **OR** ondansetron, acetaminophen **OR** acetaminophen, magnesium hydroxide, HYDROcodone 5 mg - acetaminophen    Allergies:  Penicillins and Sulfa antibiotics    Social History:   Social History     Socioeconomic History    Marital status:    Tobacco Use    Smoking status: Never    Smokeless tobacco: Never   Vaping Use    Vaping Use: Never used   Substance and Sexual Activity    Alcohol use: Yes     Alcohol/week: 0.0 standard drinks     Comment: socially    Drug use: No     Family History:       Problem Relation Age of Onset    Breast Cancer Mother     Cancer Mother     High Blood Pressure Maternal Grandmother    . Otherwise non-pertinent to the chief complaint. REVIEW OF SYSTEMS:    As mentioned in HPI, all other systems negative.        PHYSICAL EXAM:    Vitals:    BP (!) 102/55   Pulse 94   Temp 97.9 °F (36.6 °C) (Oral)   Resp 18   Ht 5' 4\" (1.626 m)   Wt 166 lb (75.3 kg) SpO2 98%   BMI 28.49 kg/m²   Constitutional: The patient is awake, alert, and oriented. Skin: Warm and dry. No rashes were noted. No jaundice. HEENT: Eyes show round, and reactive pupils. Moist mucous membranes, no ulcerations, no thrush. Neck: Supple to movements. No lymphadenopathy. Chest: No use of accessory muscles to breathe. Symmetrical expansion. Auscultation reveals no wheezing, crackles, or rhonchi. Cardiovascular: S1 and S2 are rhythmic and regular. No murmurs appreciated. Abdomen: soft, non tender,. Left sided costovertebral angle tenderness  Extremities: No clubbing, no cyanosis, no edema.   Musculoskeletal: no gross focal abnormalities  Neurological: alert, oriented x 3  Lines: peripheral      CBC+dif:  Recent Labs     10/11/22  1650   WBC 15.8*   HGB 14.3   HCT 40.4   MCV 91.2      NEUTROABS 13.68*     Lab Results   Component Value Date    CRP 0.1 02/04/2016    CRP 0.8 (H) 01/09/2016     No results found for: CRPHS  Lab Results   Component Value Date    SEDRATE 12 02/04/2016    SEDRATE 11 01/09/2016     Lab Results   Component Value Date    ALT 5 10/11/2022    AST 22 10/11/2022    ALKPHOS 105 (H) 10/11/2022    BILITOT 1.2 10/11/2022     Lab Results   Component Value Date/Time     10/11/2022 04:50 PM    K 4.2 10/11/2022 04:50 PM    CL 93 10/11/2022 04:50 PM    CO2 26 10/11/2022 04:50 PM    BUN 12 10/11/2022 04:50 PM    CREATININE 1.3 10/11/2022 04:50 PM    GFRAA 54 10/11/2022 04:50 PM    LABGLOM 45 10/11/2022 04:50 PM    GLUCOSE 78 10/11/2022 04:50 PM    PROT 7.6 10/11/2022 04:50 PM    LABALBU 4.1 10/11/2022 04:50 PM    CALCIUM 9.4 10/11/2022 04:50 PM    BILITOT 1.2 10/11/2022 04:50 PM    ALKPHOS 105 10/11/2022 04:50 PM    AST 22 10/11/2022 04:50 PM    ALT 5 10/11/2022 04:50 PM       No results found for: PROTIME, INR    Lab Results   Component Value Date/Time    TSH 9.530 08/13/2022 09:28 AM       Lab Results   Component Value Date/Time    COLORU Yellow 10/11/2022 04:56 PM

## 2022-10-13 LAB
ANION GAP SERPL CALCULATED.3IONS-SCNC: 9 MMOL/L (ref 7–16)
BASOPHILS ABSOLUTE: 0.05 E9/L (ref 0–0.2)
BASOPHILS RELATIVE PERCENT: 0.4 % (ref 0–2)
BUN BLDV-MCNC: 7 MG/DL (ref 6–20)
CALCIUM SERPL-MCNC: 8.7 MG/DL (ref 8.6–10.2)
CHLORIDE BLD-SCNC: 104 MMOL/L (ref 98–107)
CO2: 23 MMOL/L (ref 22–29)
CREAT SERPL-MCNC: 0.9 MG/DL (ref 0.5–1)
EOSINOPHILS ABSOLUTE: 0.1 E9/L (ref 0.05–0.5)
EOSINOPHILS RELATIVE PERCENT: 0.9 % (ref 0–6)
GFR AFRICAN AMERICAN: >60
GFR NON-AFRICAN AMERICAN: >60 ML/MIN/1.73
GLUCOSE BLD-MCNC: 120 MG/DL (ref 74–99)
HBA1C MFR BLD: 7 % (ref 4–5.6)
HCT VFR BLD CALC: 34.6 % (ref 34–48)
HEMOGLOBIN: 11.8 G/DL (ref 11.5–15.5)
IMMATURE GRANULOCYTES #: 0.04 E9/L
IMMATURE GRANULOCYTES %: 0.4 % (ref 0–5)
LYMPHOCYTES ABSOLUTE: 0.76 E9/L (ref 1.5–4)
LYMPHOCYTES RELATIVE PERCENT: 6.8 % (ref 20–42)
MCH RBC QN AUTO: 31.6 PG (ref 26–35)
MCHC RBC AUTO-ENTMCNC: 34.1 % (ref 32–34.5)
MCV RBC AUTO: 92.8 FL (ref 80–99.9)
MONOCYTES ABSOLUTE: 1.23 E9/L (ref 0.1–0.95)
MONOCYTES RELATIVE PERCENT: 11.1 % (ref 2–12)
NEUTROPHILS ABSOLUTE: 8.94 E9/L (ref 1.8–7.3)
NEUTROPHILS RELATIVE PERCENT: 80.4 % (ref 43–80)
PDW BLD-RTO: 12.8 FL (ref 11.5–15)
PLATELET # BLD: 208 E9/L (ref 130–450)
PMV BLD AUTO: 10.1 FL (ref 7–12)
POTASSIUM SERPL-SCNC: 3.6 MMOL/L (ref 3.5–5)
RBC # BLD: 3.73 E12/L (ref 3.5–5.5)
SODIUM BLD-SCNC: 136 MMOL/L (ref 132–146)
WBC # BLD: 11.1 E9/L (ref 4.5–11.5)

## 2022-10-13 PROCEDURE — 36415 COLL VENOUS BLD VENIPUNCTURE: CPT

## 2022-10-13 PROCEDURE — 83036 HEMOGLOBIN GLYCOSYLATED A1C: CPT

## 2022-10-13 PROCEDURE — 6360000002 HC RX W HCPCS: Performed by: STUDENT IN AN ORGANIZED HEALTH CARE EDUCATION/TRAINING PROGRAM

## 2022-10-13 PROCEDURE — 2580000003 HC RX 258: Performed by: FAMILY MEDICINE

## 2022-10-13 PROCEDURE — 2580000003 HC RX 258: Performed by: STUDENT IN AN ORGANIZED HEALTH CARE EDUCATION/TRAINING PROGRAM

## 2022-10-13 PROCEDURE — 6370000000 HC RX 637 (ALT 250 FOR IP): Performed by: FAMILY MEDICINE

## 2022-10-13 PROCEDURE — 6360000002 HC RX W HCPCS: Performed by: FAMILY MEDICINE

## 2022-10-13 PROCEDURE — 80048 BASIC METABOLIC PNL TOTAL CA: CPT

## 2022-10-13 PROCEDURE — 6370000000 HC RX 637 (ALT 250 FOR IP): Performed by: INTERNAL MEDICINE

## 2022-10-13 PROCEDURE — 85025 COMPLETE CBC W/AUTO DIFF WBC: CPT

## 2022-10-13 PROCEDURE — 2580000003 HC RX 258: Performed by: INTERNAL MEDICINE

## 2022-10-13 PROCEDURE — 2060000000 HC ICU INTERMEDIATE R&B

## 2022-10-13 RX ORDER — POTASSIUM CHLORIDE 20 MEQ/1
40 TABLET, EXTENDED RELEASE ORAL ONCE
Status: COMPLETED | OUTPATIENT
Start: 2022-10-13 | End: 2022-10-13

## 2022-10-13 RX ADMIN — CEFEPIME 2000 MG: 2 INJECTION, POWDER, FOR SOLUTION INTRAVENOUS at 09:04

## 2022-10-13 RX ADMIN — DULOXETINE HYDROCHLORIDE 60 MG: 60 CAPSULE, DELAYED RELEASE ORAL at 08:48

## 2022-10-13 RX ADMIN — LEVOTHYROXINE SODIUM 100 MCG: 100 TABLET ORAL at 05:36

## 2022-10-13 RX ADMIN — ARIPIPRAZOLE 2 MG: 2 TABLET ORAL at 08:48

## 2022-10-13 RX ADMIN — LOSARTAN POTASSIUM 50 MG: 50 TABLET, FILM COATED ORAL at 08:50

## 2022-10-13 RX ADMIN — HYDROXYCHLOROQUINE SULFATE 200 MG: 200 TABLET ORAL at 08:47

## 2022-10-13 RX ADMIN — SODIUM CHLORIDE: 9 INJECTION, SOLUTION INTRAVENOUS at 22:47

## 2022-10-13 RX ADMIN — POTASSIUM CHLORIDE 40 MEQ: 1500 TABLET, EXTENDED RELEASE ORAL at 12:17

## 2022-10-13 RX ADMIN — BUSPIRONE HYDROCHLORIDE 15 MG: 10 TABLET ORAL at 08:50

## 2022-10-13 RX ADMIN — LACTULOSE 20 G: 20 SOLUTION ORAL at 08:48

## 2022-10-13 RX ADMIN — CEFEPIME 2000 MG: 2 INJECTION, POWDER, FOR SOLUTION INTRAVENOUS at 17:18

## 2022-10-13 RX ADMIN — GABAPENTIN 300 MG: 300 CAPSULE ORAL at 08:48

## 2022-10-13 RX ADMIN — HYDROXYCHLOROQUINE SULFATE 200 MG: 200 TABLET ORAL at 20:24

## 2022-10-13 RX ADMIN — BUSPIRONE HYDROCHLORIDE 15 MG: 10 TABLET ORAL at 20:25

## 2022-10-13 RX ADMIN — SODIUM CHLORIDE, PRESERVATIVE FREE 10 ML: 5 INJECTION INTRAVENOUS at 08:48

## 2022-10-13 RX ADMIN — GABAPENTIN 300 MG: 300 CAPSULE ORAL at 20:25

## 2022-10-13 RX ADMIN — SODIUM CHLORIDE, PRESERVATIVE FREE 10 ML: 5 INJECTION INTRAVENOUS at 20:25

## 2022-10-13 RX ADMIN — CEFEPIME 2000 MG: 2 INJECTION, POWDER, FOR SOLUTION INTRAVENOUS at 01:03

## 2022-10-13 RX ADMIN — TRAZODONE HYDROCHLORIDE 100 MG: 50 TABLET ORAL at 21:16

## 2022-10-13 RX ADMIN — DULOXETINE HYDROCHLORIDE 60 MG: 60 CAPSULE, DELAYED RELEASE ORAL at 20:24

## 2022-10-13 RX ADMIN — ATORVASTATIN CALCIUM 40 MG: 40 TABLET, FILM COATED ORAL at 20:24

## 2022-10-13 RX ADMIN — PANTOPRAZOLE SODIUM 40 MG: 40 TABLET, DELAYED RELEASE ORAL at 05:36

## 2022-10-13 RX ADMIN — ENOXAPARIN SODIUM 40 MG: 100 INJECTION SUBCUTANEOUS at 08:47

## 2022-10-13 ASSESSMENT — PAIN SCALES - GENERAL
PAINLEVEL_OUTOF10: 0
PAINLEVEL_OUTOF10: 0

## 2022-10-13 NOTE — H&P
Hospital Medicine History & Physical      PCP: Ana Boateng DO    Date of Admission: 10/12/2022    Date of Service: . OCT 12 2022    Chief Complaint:  FLANK PAIN      History Of Present Illness:     36 y.o. female presented with FLANK PAIN. PT STATES SHE WAS TREATED FOR A UTI  FOR 7-10 DAYS, AND FELT BETTER. SEVERAL DAYS LATER, SHE BEGAN TO HAVE FREQUENCY , HEMATURIA, FEVER, AND CHILLS. SO SHE CAME TO [facility]     Past Medical History:          Diagnosis Date    Anxiety     Depression     Diabetes mellitus type 1, controlled, without complications ([facility] [address] [address]75    Epicondylitis, lateral 2017    right    GERD (gastroesophageal reflux disease)     Hypertension     IBS (irritable bowel syndrome)     Insulin pump status has insulin pump    follows with Dr. Ambar Santiago    SIRS (systemic inflammatory response syndrome) ([facility] [address] 2016    Thyroid disease     Trigger finger     right index and little finger       Past Surgical History:          Procedure Laterality Date    CARPAL TUNNEL RELEASE  2011    left hand     DEBRIDEMENT Right 2017    Right lateral epicondylar debridement    ENDOSCOPY, COLON, DIAGNOSTIC      FINGER TRIGGER RELEASE      middle finger bilat, and right thumb  / multiple  / last one 2017    FINGER TRIGGER RELEASE Right 2016    4th finger    FINGER TRIGGER RELEASE Right 4/3/2019    TRIGGER RELEASE RIGHT INDEX AND SMALL FINGER performed by Lucinda De Leon MD at [address] [place] [address]      exploratory     [place]  DELIVERY ONLY  x2    , Low Cervical    SKIN BIOPSY         Medications Prior to Admission:      Prior to Admission medications    Medication Sig Start Date End Date Taking? Authorizing Provider   ADDERALL XR 20 MG capsule Take 20 mg by mouth daily.  22   Historical Provider, MD   blood glucose test strips (ONETOUCH ULTRA) strip USE TO TEST FOUR TIMES DAILY 9/19/22   Areli Eisenberg MD   Insulin Disposable Pump (OMNIPOD 5 G6 INTRO, GEN 5,) KIT Use to infuse insulin 9/13/22   JOSEPH Gallardo NP   atorvastatin (LIPITOR) 40 MG tablet Take 1 tablet by mouth nightly 8/15/22   JOSEPH Taylor CNP   levothyroxine (SYNTHROID) 100 MCG tablet Take 1 tablet by mouth in the morning. 8/16/22   JOSEPH Taylor CNP   pantoprazole (PROTONIX) 40 MG tablet Take 1 tablet by mouth every morning (before breakfast)  Patient not taking: Reported on 10/12/2022 8/16/22   JOSEPH Taylor CNP   insulin aspart (NOVOLOG) 100 UNIT/ML injection vial basal rate 12a 1.0, 8a 1.55, 10p 1.0, CR 12, ISF 45, goal 100-150, active insulin time 3 hrs. 8/15/22   JOSEPH Taylor CNP   meloxicam (MOBIC) 7.5 MG tablet Take 7.5 mg by mouth in the morning and 7.5 mg before bedtime. Historical Provider, MD   busPIRone (BUSPAR) 15 MG tablet Take 15 mg by mouth in the morning and 15 mg before bedtime. Historical Provider, MD   traZODone (DESYREL) 100 MG tablet Take 100 mg by mouth nightly    Historical Provider, MD   Insulin Disposable Pump (OMNIPOD 5 G6 POD, GEN 5,) MISC Change pod every 72 hours 8/3/22   JOSEPH Gallardo NP   hydroxychloroquine (PLAQUENIL) 200 MG tablet Take 200 mg by mouth 2 times daily    Historical Provider, MD   Levonorgestrel (LILETTA, 52 MG,) IUD 52 mg 1 each by IntraUTERine route once Inserted 1/19/2022    Historical Provider, MD   Insulin Disposable Pump (OMNIPOD DASH 5 PACK PODS) MISC USE TO CONTINUOUSLY        ADMINISTER INSULIN AND     CHANGE EVERY 3 DAYS 1/3/22   Areli Eisenberg MD   esomeprazole Magnesium (NEXIUM) 20 MG PACK Take 40 mg by mouth in the morning. Historical Provider, MD   lidocaine viscous hcl (XYLOCAINE) 2 % SOLN solution Use small amount vaginally as needed for pain.  12/9/21   Raad Maizes, [facility]   Insulin Disposable Pump (OMNIPOD DASH SYSTEM) KIT To continuously administer inuslin 6/17/20   Raya Ross MD   ARIPiprazole (ABILIFY) 2 MG tablet Take 2 mg by mouth daily    Historical Provider, MD   Cholecalciferol (VITAMIN D3) 2000 UNITS CAPS Take 25,000 Units by mouth once a week wed    Historical Provider, MD   gabapentin (NEURONTIN) 100 MG capsule Take 300 mg by mouth in the morning and 300 mg in the evening. Takes nightly. Historical Provider, MD   losartan (COZAAR) 50 MG tablet Take 50 mg by mouth daily 2/20/16   Historical Provider, MD   DULoxetine (CYMBALTA) 30 MG capsule Take 60 mg by mouth 2 times daily     Historical Provider, MD       Allergies:  Penicillins and Sulfa antibiotics    Social History:      The patient currently lives     TOBACCO:   reports that she has never smoked. She has never used smokeless tobacco.  ETOH:   reports current alcohol use. Family History:       Reviewed in detail and negative for DM, CAD, Cancer, CVA. Positive as follows:        Problem Relation Age of Onset    Breast Cancer Mother     Cancer Mother     High Blood Pressure Maternal Grandmother        REVIEW OF SYSTEMS:   Pertinent positives as noted in the HPI. All other systems reviewed and negative. PHYSICAL EXAM:    BP (!) 148/68   Pulse (!) 125   Temp 100.2 °F (37.9 °C) (Oral)   Resp 18   Ht 5' 4\" (1.626 m)   Wt 166 lb (75.3 kg)   SpO2 99%   BMI 28.49 kg/m²     General appearance:  No apparent distress, appears stated age. HEENT:  Normal cephalic, atraumatic without obvious deformity. Pupils equal, round, and reactive to light. Extra ocular muscles intact. Conjunctivae/corneas clear. Neck: Supple, with full range of motion. No jugular venous distention. Trachea midline. Respiratory:  Normal respiratory effort. Clear to auscultation,   Cardiovascular:  RRR  Abdomen: Soft, non-tender, non-distended with normal bowel sounds. POS LLOYDS ON THE RIGHT  Musculoskeletal:  No clubbing, cyanosis or edema bilaterally.     Skin: smooth and dry Neurologic:   Cranial nerves: II-XII intact,   Psychiatric:  Alert and oriented x 3        Labs:     Recent Labs     10/11/22  1650 10/12/22  1155   WBC 15.8* 14.2*   HGB 14.3 12.3   HCT 40.4 36.2    191     Recent Labs     10/11/22  1650 10/12/22  1155    130*   K 4.2 3.8   CL 93* 98   CO2 26 21*   BUN 12 11   CREATININE 1.3* 1.1*   CALCIUM 9.4 8.4*     Recent Labs     10/11/22  1650   AST 22   ALT 5   BILITOT 1.2   ALKPHOS 105*     No results for input(s): INR in the last 72 hours. No results for input(s): Ximena Nasuti in the last 72 hours. Urinalysis:      Lab Results   Component Value Date/Time    NITRU POSITIVE 10/11/2022 04:56 PM    WBCUA PACKED 10/11/2022 04:56 PM    BACTERIA MODERATE 10/11/2022 04:56 PM    RBCUA 5-10 10/11/2022 04:56 PM    BLOODU MODERATE 10/11/2022 04:56 PM    SPECGRAV 1.020 10/11/2022 04:56 PM    GLUCOSEU Negative 10/11/2022 04:56 PM       Radiology:           No orders to display       ASSESSMENT:    Active Hospital Problems    Diagnosis Date Noted    Pyelonephritis [N12] 10/12/2022     Priority: Medium   HYPOTHYROID   FECAL IMPACTION   RA   HLD   II DM   NEUROPATHY      PLAN:  SSI   PLAQUENIL  COZAAR   SYNTHROID   LACTULOASE  ID CONSULT   MAXIPIME    DVT Prophylaxis: LOVENOX  Diet: ADULT DIET; Regular; 4 carb choices (60 gm/meal)  Code Status: Full Code    PT/OT Eval Status: NA    Dispo - HOME    Electronically signed by Lethaniel Frankel, DO on 10/12/2022 at 8:06 PM [facility]       Thank you Ana Boateng DO for the opportunity to be involved in this patient's care.  If you have any questions or concerns please feel free to contact me at [phone]

## 2022-10-13 NOTE — PROGRESS NOTES
[address] [place] [address] [facility]  Progress Note    SUBJECTIVE:  CC: left sided back pain    Patient is tolerating medications. No reported adverse drug reactions. No nausea, vomiting, diarrhea. Laying in bed, family member and nursing at bedside  Feeling better, denies L sided tenderness  Appetite is fair  T: 101.1 last evening     Review of systems:  As stated above in the chief complaint, otherwise negative. Medications:  Scheduled Meds:   amphetamine-dextroamphetamine  20 mg Oral Daily    ARIPiprazole  2 mg Oral Daily    atorvastatin  40 mg Oral Nightly    DULoxetine  60 mg Oral BID    gabapentin  300 mg Oral BID    hydroxychloroquine  200 mg Oral BID    levothyroxine  100 mcg Oral Daily    losartan  50 mg Oral Daily    traZODone  100 mg Oral Nightly    sodium chloride flush  5-40 mL IntraVENous 2 times per day    enoxaparin  40 mg SubCUTAneous Daily    busPIRone  15 mg Oral BID    pantoprazole  40 mg Oral QAM AC    lactulose  20 g Oral BID    cefepime  2,000 mg IntraVENous Q8H    insulin lispro  0-10 Units SubCUTAneous 4x Daily AC & HS     Continuous Infusions:   sodium chloride 75 mL/hr at 10/12/22 1758    sodium chloride      dextrose       PRN Meds:sodium chloride flush, sodium chloride, ondansetron **OR** ondansetron, acetaminophen **OR** acetaminophen, magnesium hydroxide, HYDROcodone 5 mg - acetaminophen, glucose, dextrose bolus **OR** dextrose bolus, glucagon (rDNA), dextrose    OBJECTIVE:  BP (!) 99/59   Pulse 98   Temp 98.6 °F (37 °C) (Oral)   Resp 16   Ht 5' 4\" (1.626 m)   Wt 169 lb (76.7 kg)   SpO2 95%   BMI 29.01 kg/m²   Temp  Av.5 °F (37.5 °C)  Min: 97.7 °F (36.5 °C)  Max: 101.1 °F (38.4 °C)  Constitutional: The patient is awake, alert, and oriented. In bed. In no distress. Skin: Warm and dry. No rashes were noted. HEENT: Round and reactive pupils. Moist mucous membranes. No ulcerations or thrush. Neck: Supple to movements.    Chest: No use of accessory muscles to breathe. Symmetrical expansion. No wheezing, crackles or rhonchi. Cardiovascular: S1 and S2 are rhythmic and regular. No murmurs appreciated. Abdomen: Positive bowel sounds to auscultation. Benign to palpation. Left sided costovertebral angle tenderness- improved   Extremities: No clubbing, no cyanosis, no edema. Lines: peripheral    Laboratory and Tests Review:  Lab Results   Component Value Date    WBC 11.1 10/13/2022    WBC 14.2 (H) 10/12/2022    WBC 15.8 (H) 10/11/2022    HGB 11.8 10/13/2022    HCT 34.6 10/13/2022    MCV 92.8 10/13/2022     10/13/2022     Lab Results   Component Value Date    NEUTROABS 8.94 (H) 10/13/2022    NEUTROABS 12.64 (H) 10/12/2022    NEUTROABS 13.68 (H) 10/11/2022     No results found for: [facility]  Lab Results   Component Value Date    ALT 5 10/11/2022    AST 22 10/11/2022    ALKPHOS 105 (H) 10/11/2022    BILITOT 1.2 10/11/2022     Lab Results   Component Value Date/Time     10/13/2022 04:55 AM    K 3.6 10/13/2022 04:55 AM    K 4.2 10/11/2022 04:50 PM     10/13/2022 04:55 AM    CO2 23 10/13/2022 04:55 AM    BUN 7 10/13/2022 04:55 AM    CREATININE 0.9 10/13/2022 04:55 AM    CREATININE 1.1 10/12/2022 11:55 AM    CREATININE 1.3 10/11/2022 04:50 PM    GFRAA >60 10/13/2022 04:55 AM    LABGLOM >60 10/13/2022 04:55 AM    GLUCOSE 120 10/13/2022 04:55 AM    PROT 7.6 10/11/2022 04:50 PM    LABALBU 4.1 10/11/2022 04:50 PM    CALCIUM 8.7 10/13/2022 04:55 AM    BILITOT 1.2 10/11/2022 04:50 PM    ALKPHOS 105 10/11/2022 04:50 PM    AST 22 10/11/2022 04:50 PM    ALT 5 10/11/2022 04:50 PM     Lab Results   Component Value Date    CRP 0.1 02/04/2016    CRP 0.8 (H) 01/09/2016     Lab Results   Component Value Date    SEDRATE 12 02/04/2016    SEDRATE 11 01/09/2016     Radiology:      Microbiology:   UA showed pyuria,   Urine cx in process    ASSESSMENT:  Left sided pyelonephritis: blood cx were not done on admission, they won't be any useful at this point.    No prior urine cx in the system. Leucocytosis: improving     PLAN:  Continue Cefepime 2gr q 8 IV for now pending urine culture   Monitor temps   Check final cultures  Monitor labs    JOSEPH Peralta CNP  8:45 AM  10/13/2022     Pt seen and examined. Above discussed agree with advanced practice nurse. Labs, cultures, and radiographs reviewed. Face to Face encounter occurred. Changes made as necessary.      Kecia Sweet MD

## 2022-10-13 NOTE — PROGRESS NOTES
Hospitalist Progress Note      PCP: Candida Standard, DO    Date of Admission: 10/12/2022        Hospital Course:  36 y.o. female presented with [facility]. PT STATES SHE WAS TREATED FOR A UTI  FOR 7-10 DAYS, AND FELT BETTER. SEVERAL DAYS LATER, SHE BEGAN TO HAVE FREQUENCY , HEMATURIA, FEVER, AND CHILLS. SO SHE CAME TO THE HOSPITAL  SEEN BY ID , CEFEPIME INCREASED TO 2 GRAMS, CULTURE PENDING         Subjective: NO COMPLAINTS           Medications:  Reviewed    Infusion Medications    sodium chloride 125 mL/hr at 10/13/22 1015    sodium chloride      dextrose       Scheduled Medications    amphetamine-dextroamphetamine  20 mg Oral Daily    ARIPiprazole  2 mg Oral Daily    atorvastatin  40 mg Oral Nightly    DULoxetine  60 mg Oral BID    gabapentin  300 mg Oral BID    hydroxychloroquine  200 mg Oral BID    levothyroxine  100 mcg Oral Daily    losartan  50 mg Oral Daily    traZODone  100 mg Oral Nightly    sodium chloride flush  5-40 mL IntraVENous 2 times per day    enoxaparin  40 mg SubCUTAneous Daily    busPIRone  15 mg Oral BID    pantoprazole  40 mg Oral QAM AC    lactulose  20 g Oral BID    cefepime  2,000 mg IntraVENous Q8H    insulin lispro  0-10 Units SubCUTAneous 4x Daily AC & HS     PRN Meds: sodium chloride flush, sodium chloride, ondansetron **OR** ondansetron, acetaminophen **OR** acetaminophen, magnesium hydroxide, HYDROcodone 5 mg - acetaminophen, glucose, dextrose bolus **OR** dextrose bolus, glucagon (rDNA), dextrose    No intake or output data in the 24 hours ending 10/13/22 1645    Exam:    /67   Pulse 95   Temp 98.8 °F (37.1 °C)   Resp 16   Ht 5' 4\" (1.626 m)   Wt 169 lb (76.7 kg)   SpO2 100%   BMI 29.01 kg/m²       General appearance:  No apparent distress, a  Neck: Supple, with full range of motion. No jugular venous distention. Trachea midline. Respiratory:  Normal respiratory effort.  Clear to auscultation,   Cardiovascular:  RRR  Abdomen: Soft, non-tender, non-distended with normal bowel sounds. POS LLOYDS ON THE RIGHT  Musculoskeletal:  No clubbing, cyanosis or edema bilaterally. Skin: smooth and dry   Neurologic:   Cranial nerves: II-XII intact,   Psychiatric:  Alert and oriented x             Labs:   Recent Labs     10/11/22  1650 10/12/22  1155 10/13/22  0455   WBC 15.8* 14.2* 11.1   HGB 14.3 12.3 11.8   HCT 40.4 36.2 34.6    191 208     Recent Labs     10/11/22  1650 10/12/22  1155 10/13/22  0455    130* 136   K 4.2 3.8 3.6   CL 93* 98 104   CO2 26 21* 23   BUN 12 11 7   CREATININE 1.3* 1.1* 0.9   CALCIUM 9.4 8.4* 8.7     Recent Labs     10/11/22  1650   AST 22   ALT 5   BILITOT 1.2   ALKPHOS 105*     No results for input(s): INR in the last 72 hours. No results for input(s): Labsunil Muellers in the last 72 hours. Recent Labs     10/11/22  1650   AST 22   ALT 5   BILITOT 1.2   ALKPHOS 105*     Recent Labs     10/11/22  1656   LACTA 1.4     No results found for: Anish Bond  No results found for: AMMONIA    Assessment:    Active Hospital Problems    Diagnosis Date Noted    Pyelonephritis [N12] 10/12/2022     Priority: Medium   HYPOTHYROID   FECAL IMPACTION   RA   HLD   II DM   NEUROPATHY        PLAN:  SSI   PLAQUENIL  COZAAR   SYNTHROID   LACTULOASE  ID CONSULT   MAXIPIME     DVT Prophylaxis: LOVENOX  Diet: ADULT DIET;  Regular; 4 carb choices (60 gm/meal)  Code Status: Full Code     PT/OT Eval Status: NA     Dispo - HOME        Electronically signed by Demian Kurtz DO on 10/13/2022 at 4:45 PM [facility] Garcia

## 2022-10-13 NOTE — PROGRESS NOTES
q  P Quality Flow/Interdisciplinary Rounds Progress Note        Quality Flow Rounds held on October 13, 2022    Disciplines Attending:  Bedside Nurse, , , and Nursing Unit Leadership    Jojo Young was admitted on 10/12/2022 12:58 AM    Anticipated Discharge Date:  Expected Discharge Date: 10/14/22    Disposition:    Renaldo Score:  Renaldo Scale Score: 21    Readmission Risk              Risk of Unplanned Readmission:  12           Discussed patient goal for the day, patient clinical progression, and barriers to discharge. The following Goal(s) of the Day/Commitment(s) have been identified:    Continue IV antibiotics.        Cornelia Mcnamara RN  October 13, 2022

## 2022-10-14 VITALS
RESPIRATION RATE: 17 BRPM | BODY MASS INDEX: 29.19 KG/M2 | TEMPERATURE: 98 F | HEIGHT: 64 IN | OXYGEN SATURATION: 100 % | HEART RATE: 95 BPM | WEIGHT: 171 LBS | DIASTOLIC BLOOD PRESSURE: 62 MMHG | SYSTOLIC BLOOD PRESSURE: 122 MMHG

## 2022-10-14 LAB
ANION GAP SERPL CALCULATED.3IONS-SCNC: 9 MMOL/L (ref 7–16)
BUN BLDV-MCNC: 4 MG/DL (ref 6–20)
CALCIUM SERPL-MCNC: 8.6 MG/DL (ref 8.6–10.2)
CHLORIDE BLD-SCNC: 105 MMOL/L (ref 98–107)
CO2: 24 MMOL/L (ref 22–29)
CREAT SERPL-MCNC: 0.8 MG/DL (ref 0.5–1)
GFR AFRICAN AMERICAN: >60
GFR NON-AFRICAN AMERICAN: >60 ML/MIN/1.73
GLUCOSE BLD-MCNC: 164 MG/DL (ref 74–99)
ORGANISM: ABNORMAL
POTASSIUM SERPL-SCNC: 3.6 MMOL/L (ref 3.5–5)
SODIUM BLD-SCNC: 138 MMOL/L (ref 132–146)
URINE CULTURE, ROUTINE: ABNORMAL

## 2022-10-14 PROCEDURE — 6370000000 HC RX 637 (ALT 250 FOR IP): Performed by: FAMILY MEDICINE

## 2022-10-14 PROCEDURE — 2580000003 HC RX 258: Performed by: STUDENT IN AN ORGANIZED HEALTH CARE EDUCATION/TRAINING PROGRAM

## 2022-10-14 PROCEDURE — 80048 BASIC METABOLIC PNL TOTAL CA: CPT

## 2022-10-14 PROCEDURE — 36415 COLL VENOUS BLD VENIPUNCTURE: CPT

## 2022-10-14 PROCEDURE — 6360000002 HC RX W HCPCS: Performed by: STUDENT IN AN ORGANIZED HEALTH CARE EDUCATION/TRAINING PROGRAM

## 2022-10-14 PROCEDURE — 6370000000 HC RX 637 (ALT 250 FOR IP): Performed by: INTERNAL MEDICINE

## 2022-10-14 PROCEDURE — 6360000002 HC RX W HCPCS: Performed by: FAMILY MEDICINE

## 2022-10-14 PROCEDURE — 2580000003 HC RX 258: Performed by: FAMILY MEDICINE

## 2022-10-14 RX ORDER — LEVOFLOXACIN 500 MG/1
500 TABLET, FILM COATED ORAL DAILY
Qty: 7 TABLET | Refills: 0 | Status: SHIPPED | OUTPATIENT
Start: 2022-10-14 | End: 2022-10-21

## 2022-10-14 RX ORDER — POTASSIUM CHLORIDE 20 MEQ/1
40 TABLET, EXTENDED RELEASE ORAL ONCE
Status: COMPLETED | OUTPATIENT
Start: 2022-10-14 | End: 2022-10-14

## 2022-10-14 RX ADMIN — SODIUM CHLORIDE, PRESERVATIVE FREE 10 ML: 5 INJECTION INTRAVENOUS at 09:16

## 2022-10-14 RX ADMIN — PANTOPRAZOLE SODIUM 40 MG: 40 TABLET, DELAYED RELEASE ORAL at 07:06

## 2022-10-14 RX ADMIN — LEVOTHYROXINE SODIUM 100 MCG: 100 TABLET ORAL at 07:06

## 2022-10-14 RX ADMIN — ARIPIPRAZOLE 2 MG: 2 TABLET ORAL at 09:09

## 2022-10-14 RX ADMIN — INSULIN LISPRO 3.15 UNITS: 100 INJECTION, SOLUTION INTRAVENOUS; SUBCUTANEOUS at 09:10

## 2022-10-14 RX ADMIN — DULOXETINE HYDROCHLORIDE 60 MG: 60 CAPSULE, DELAYED RELEASE ORAL at 09:09

## 2022-10-14 RX ADMIN — CEFEPIME 2000 MG: 2 INJECTION, POWDER, FOR SOLUTION INTRAVENOUS at 09:27

## 2022-10-14 RX ADMIN — HYDROXYCHLOROQUINE SULFATE 200 MG: 200 TABLET ORAL at 09:09

## 2022-10-14 RX ADMIN — GABAPENTIN 300 MG: 300 CAPSULE ORAL at 09:09

## 2022-10-14 RX ADMIN — CEFEPIME 2000 MG: 2 INJECTION, POWDER, FOR SOLUTION INTRAVENOUS at 00:59

## 2022-10-14 RX ADMIN — LOSARTAN POTASSIUM 50 MG: 50 TABLET, FILM COATED ORAL at 09:09

## 2022-10-14 RX ADMIN — BUSPIRONE HYDROCHLORIDE 15 MG: 10 TABLET ORAL at 09:09

## 2022-10-14 RX ADMIN — POTASSIUM CHLORIDE 40 MEQ: 1500 TABLET, EXTENDED RELEASE ORAL at 10:58

## 2022-10-14 ASSESSMENT — PAIN SCALES - GENERAL: PAINLEVEL_OUTOF10: 0

## 2022-10-14 NOTE — CARE COORDINATION
Await final antibiotic plan, cefepime 2gm q8hrs. Pt will return home, no needs. Will follow along with  and assist with discharge planning as necessary.    Min Singleton, BSN, RN  [facility] Case Management  [phone]

## 2022-10-14 NOTE — PROGRESS NOTES
[facility] Quality Flow/Interdisciplinary Rounds Progress Note        Quality Flow Rounds held on October 14, 2022    Disciplines Attending:  Bedside Nurse, , , and Nursing Unit Leadership    Mohit Sánchez was admitted on 10/12/2022 12:58 AM    Anticipated Discharge Date:  Expected Discharge Date: 10/14/22    Disposition:    Renaldo Score:  Renaldo Scale Score: 21    Readmission Risk              Risk of Unplanned Readmission:  12           Discussed patient goal for the day, patient clinical progression, and barriers to discharge. The following Goal(s) of the Day/Commitment(s) have been identified:  Await urine culture, continue IV antibiotics.       Carmina Harding RN  October 14, 2022

## 2022-10-14 NOTE — PLAN OF CARE
Problem: Discharge Planning  Goal: Discharge to home or other facility with appropriate resources  10/14/2022 1802 by Lupe Morillo RN  Outcome: Completed  10/14/2022 1300 by Lupe Morillo RN  Outcome: Progressing     Problem: Pain  Goal: Verbalizes/displays adequate comfort level or baseline comfort level  10/14/2022 1802 by Lupe Morillo RN  Outcome: Completed  10/14/2022 1300 by Lupe Morillo RN  Outcome: Progressing     Problem: Safety - Adult  Goal: Free from fall injury  10/14/2022 1802 by Lupe Morillo RN  Outcome: Completed  10/14/2022 1300 by Lupe Morillo RN  Outcome: Progressing

## 2022-10-14 NOTE — PROGRESS NOTES
Physician Progress Note      PATIENT:               Jeny Martin  CSN #:                  [id number]  :                       1980  ADMIT DATE:       10/12/2022 12:58 AM  DISCH DATE:  Inés Hines  PROVIDER #:        Deepti Bradley DO          QUERY TEXT:    Patient admitted with pyelonephritis. Patient noted to have increased WBC and   temperature. If possible, please document in the progress notes and discharge   summary if you are evaluating and /or treating any of the following: The medical record reflects the following:  Risk Factors: Pyelonephritis  Clinical Indicators: Per H&P \". .. FLANK PAIN. PT STATES SHE WAS TREATED FOR A   UTI  FOR 7-10 DAYS, AND FELT BETTER. SEVERAL DAYS LATER, SHE BEGAN TO HAVE   FREQUENCY , HEMATURIA, FEVER, AND CHILLS. Lizz Stout Pyelonephritis. Lizz Argelia Lizz Argelia \" WBC on 10/12 of   14.2 Temp on 10/12 of 101.1  Treatment: cefepime  Options provided:  -- Sepsis, present on admission  -- Sepsis, present on admission, now resolved  -- Sepsis, not present on admission  -- Pyelonephritis without Sepsis  -- Other - I will add my own diagnosis  -- Disagree - Not applicable / Not valid  -- Disagree - Clinically unable to determine / Unknown  -- Refer to Clinical Documentation Reviewer    PROVIDER RESPONSE TEXT:    This patient has sepsis which was present on admission.     Query created by: Tyler Mosley on 10/13/2022 11:42 AM      Electronically signed by:  Deepti Bradley DO 10/14/2022 9:59 AM

## 2022-10-19 NOTE — DISCHARGE SUMMARY
Hospitalist Discharge Summary    Patient ID: Angelo Mtz   Patient : 1980  Patient's PCP: Alicia Ugarte DO    Admit Date: 10/12/2022   Admitting Physician: Uriel Barger DO    Discharge Date:  10/19/2022   Discharge Physician: Uriel Barger DO   Discharge Condition: Stable  Discharge Disposition: Home      Discharge Diagnoses: Active Hospital Problems    Diagnosis Date Noted    Pyelonephritis [N12] 10/12/2022     Priority: Medium   HYPOTHYROID   FECAL IMPACTION   RA   HLD   II DM   [address] course in brief:        PHYSICAL EXAM:    /62   Pulse 95   Temp 98 °F (36.7 °C) (Oral)   Resp 17   Ht 5' 4\" (1.626 m)   Wt 171 lb (77.6 kg)   SpO2 100%   BMI 29.35 kg/m²   General appearance:  No apparent distress, a  Neck: Supple, with full range of motion. No jugular venous distention. Trachea midline. Respiratory:  Normal respiratory effort. Clear to auscultation,   Cardiovascular:  RRR  Abdomen: Soft, non-tender, non-distended with normal bowel sounds. POS LLOYDS ON THE RIGHT  Musculoskeletal:  No clubbing, cyanosis or edema bilaterally. Skin: smooth and dry   Neurologic:   Cranial nerves: II-XII intact,   Psychiatric:  Alert and oriented x      Prior to Admission medications    Medication Sig Start Date End Date Taking? Authorizing Provider   levoFLOXacin (LEVAQUIN) 500 MG tablet Take 1 tablet by mouth daily for 7 days 10/14/22 10/21/22 Yes Adamaris Cruz APRN - CNP   ADDERALL XR 20 MG capsule Take 20 mg by mouth daily.  22   Historical Provider, MD   blood glucose test strips (ONETOUCH ULTRA) strip USE TO TEST FOUR TIMES DAILY 22   Robin Christie MD   Insulin Disposable Pump (OMNIPOD 5 G6 INTRO, GEN 5,) KIT Use to infuse insulin 22   JOSEPH Gallardo - NP   atorvastatin (LIPITOR) 40 MG tablet Take 1 tablet by mouth nightly 8/15/22   JOSEPH Valerio - CNP   levothyroxine (SYNTHROID) 100 MCG tablet Take 1 tablet by mouth in the morning. 8/16/22   JOSEPH Gastelum - CNP   pantoprazole (PROTONIX) 40 MG tablet Take 1 tablet by mouth every morning (before breakfast)  Patient not taking: No sig reported 8/16/22   JOSEPH Gastelum - CNP   insulin aspart (NOVOLOG) 100 UNIT/ML injection vial basal rate 12a 1.0, 8a 1.55, 10p 1.0, CR 12, ISF 45, goal 100-150, active insulin time 3 hrs. 8/15/22   JOSEPH Gastelum - CNP   meloxicam (MOBIC) 7.5 MG tablet Take 7.5 mg by mouth in the morning and 7.5 mg before bedtime. Historical Provider, MD   busPIRone (BUSPAR) 15 MG tablet Take 15 mg by mouth in the morning and 15 mg before bedtime. Historical Provider, MD   traZODone (DESYREL) 100 MG tablet Take 100 mg by mouth nightly    Historical Provider, MD   Insulin Disposable Pump (OMNIPOD 5 G6 POD, GEN 5,) MISC Change pod every 72 hours 8/3/22   Nathalie Corey APRN - NP   hydroxychloroquine (PLAQUENIL) 200 MG tablet Take 200 mg by mouth 2 times daily    Historical Provider, MD   Levonorgestrel (LILETTA, 52 MG,) IUD 52 mg 1 each by IntraUTERine route once Inserted 1/19/2022    Historical Provider, MD   Insulin Disposable Pump (OMNIPOD DASH 5 PACK PODS) MISC USE TO CONTINUOUSLY        ADMINISTER INSULIN AND     CHANGE EVERY 3 DAYS 1/3/22   Too Mazariegos MD   esomeprazole Magnesium (NEXIUM) 20 MG PACK Take 40 mg by mouth in the morning. Historical Provider, MD   lidocaine viscous hcl (XYLOCAINE) 2 % SOLN solution Use small amount vaginally as needed for pain.  12/9/21   [facility]   Insulin Disposable Pump (OMNIPOD DASH SYSTEM) KIT To continuously administer inuslin 6/17/20   Too Mazariegos MD   ARIPiprazole (ABILIFY) 2 MG tablet Take 2 mg by mouth daily    Historical Provider, MD   Cholecalciferol (VITAMIN D3) 2000 UNITS CAPS Take 25,000 Units by mouth once a week wed    Historical Provider, MD   gabapentin (NEURONTIN) 100 MG capsule Take 300 mg by mouth in the morning and 300 mg in the evening. Takes nightly. Historical Provider, MD   losartan (COZAAR) 50 MG tablet Take 50 mg by mouth daily 2/20/16   Historical Provider, MD   DULoxetine (CYMBALTA) 30 MG capsule Take 60 mg by mouth 2 times daily     Historical Provider, MD       Consults:   IP CONSULT TO INFECTIOUS DISEASES  IP CONSULT TO IV TEAM  IP CONSULT TO IV TEAM            Discharge Instructions / Follow up:    Future Appointments   Date Time Provider Radha De La Rosa   10/31/2022 10:10 AM Jerry Strong MD [facility]   12/14/2022 10:00 AM [facility] AFL Teto POLANCO       Continued appropriate risk factor modification of blood pressure, diabetes and serum lipids will remain essential to reducing risk of future atherosclerotic development    Activity: activity as tolerated    Significant labs:  CBC:   No results for input(s): WBC, RBC, HGB, HCT, MCV, RDW, PLT in the last 72 hours. BMP: No results for input(s): NA, K, CL, CO2, BUN, CREATININE, CA, MG, PHOS in the last 72 hours. LFT:  No results for input(s): PROT, ALB, ALKPHOS, ALT, AST, BILITOT, AMYLASE, LIPASE in the last 72 hours. PT/INR: No results for input(s): INR, APTT in the last 72 hours. BNP: No results for input(s): BNP in the last 72 hours.   Hgb A1C:   Lab Results   Component Value Date    LABA1C 7.0 (H) 10/13/2022     Folate and B12:   Lab Results   Component Value Date    [id number] 412 12/03/2015   , No results found for: FOLATE  Thyroid Studies:   Lab Results   Component Value Date    TSH 9.530 (H) 08/13/2022    U8MONES 103.80 12/03/2015       Urinalysis:    Lab Results   Component Value Date/Time    NITRU POSITIVE 10/11/2022 04:56 PM    WBCUA PACKED 10/11/2022 04:56 PM    BACTERIA MODERATE 10/11/2022 04:56 PM    RBCUA 5-10 10/11/2022 04:56 PM    BLOODU MODERATE 10/11/2022 04:56 PM    SPECGRAV 1.020 10/11/2022 04:56 PM    GLUCOSEU Negative 10/11/2022 04:56 PM       Imaging:  CT ABDOMEN PELVIS W IV CONTRAST Additional Contrast? None    Result Date: 10/11/2022  EXAMINATION: CT OF THE ABDOMEN AND PELVIS WITH CONTRAST 10/11/2022 5:48 pm TECHNIQUE: CT of the abdomen and pelvis was performed with the administration of intravenous contrast. Multiplanar reformatted images are provided for review. Automated exposure control, iterative reconstruction, and/or weight based adjustment of the mA/kV was utilized to reduce the radiation dose to as low as reasonably achievable. COMPARISON: CT of the abdomen and pelvis, 11/27/2016. CONTRAST: 50 cc Isovue-370 was injected intravenously. HISTORY: ORDERING SYSTEM PROVIDED HISTORY: left flank pain, previous UTI, chills, nausea, fever TECHNOLOGIST PROVIDED HISTORY: Additional Contrast?->None Reason for exam:->left flank pain, previous UTI, chills, nausea, fever Decision Support Exception - unselect if not a suspected or confirmed emergency medical condition->Emergency Medical Condition (MA) FINDINGS: Lower Chest: Minimal dependent atelectasis in the lower lobes, associated trace pleural fluid. The heart is normal in size. Small pericardial effusion. Organs: Liver: 1.5 x 0.6 cm subcapsular fatty lesion in the posterior aspect of the right lobe of the liver (series 2, image 48) likely representing an angiomyolipoma. The liver is otherwise unremarkable. . Gallbladder: Unremarkable. Pancreas: Unremarkable. Spleen:  Unremarkable. Adrenals: Unremarkable. Kidneys: There is mild inhomogeneity of the left renal enhancement, with perinephric enhancement. Findings likely represent acute pyelonephritis. No abscess. No hydronephrosis. GI/Bowel: Moderate fecal retention is seen in the colon, more so proximally. No evidence of bowel wall thickening or obstruction. Pelvis: Urinary bladder is grossly unremarkable. An IUD is seen in the uterus, which is Peritoneum/Retroperitoneum: No lymphadenopathy. No free air or free fluid is seen. The abdominal aorta and pelvic arteries are unremarkable. Bones/Soft Tissues:  The visualized bones are intact without fracture or focal lesion. 1. Left pyelonephritis (inhomogeneous enhancement of the left kidney with perinephric edema). No evidence of abscess, urolithiasis or hydronephrosis. 2. Moderate fecal retention in the colon, worse proximally. No evidence of bowel wall thickening or obstruction. 3. Small pericardial effusion. Trace pleural effusions. No ascites. [facility] ABNER DIGITAL SCREEN BILATERAL    Result Date: 10/19/2022  EXAMINATION: SCREENING DIGITAL BILATERAL MAMMOGRAM WITH TOMOSYNTHESIS, 10/7/2022 TECHNIQUE: Screening mammography of the bilateral breasts was performed with tomosynthesis. 2D standard and 3D tomosynthesis combination imaging performed through both breasts in the MLO and CC projection. Computer aided detection was utilized in the interpretation of this exam. COMPARISON: 11/09/2020, 10/22/2019 HISTORY: Screening. FINDINGS: There is heterogeneously dense fibroglandular pattern. In the left CC view laterally, there is a 13 mm ovoid density. There is no suspicious appearing mass or calcification in either breast.     Recommend coned-down compression imaging of the left breast in CC projection and possible ultrasound. Additionally recommend screening MRI as result of increased breast density and elevated Tyrer Coolidge score (25.3%) BIRADS: BIRADS - CATEGORY 0 Incomplete: Needs Additional Imaging Evaluation OVERALL ASSESSMENT - INCOMPLETE:NEED ADDITIONAL IMAGING EVALUATION. A letter of notification will be sent to the patient regarding the results. RISK ASSESSMENT: During this patient's visit, information obtained was used to generate a lifetime risk assessment using the Tyrer-Cuzick model (also called the [employer] International Breast Cancer Intervention study Breast Cancer Risk Evaluation Tool).  LIFETIME RISK: Patient has a Tyrer-Cuzick score of: 25.3% BREAST TISSUE DENSITY Heterogeneously dense HIGH RISK ( > 20% Lifetime Risk) The National [facility] Cancer Network (NCCN) and the [address] recommend that routine yearly screening includes both yearly MRI's and yearly screening mammograms. MRI should be in addition to, not instead of, a screening mammogram. Note: Mammography is not 100% accurate in the detection of breast cancer. Accuracy decreases as mammographic density of the breast increases. A negative mammogram should not deter further evaluation (including biopsy) of suspicious physical findings. Recommendations are based on NCCN ([address]) and [employer] of Radiology) guidelines. Thank you for sending your patient to this ACR and FDA approved facility. If there are physician concerns regarding this report, a Radiologist can be reached by calling the following number [phone]. Discharge Medications:      Medication List        START taking these medications      levoFLOXacin 500 MG tablet  Commonly known as: LEVAQUIN  Take 1 tablet by mouth daily for 7 days            CONTINUE taking these medications      Adderall XR 20 MG extended release capsule  Generic drug: amphetamine-dextroamphetamine     ARIPiprazole 2 MG tablet  Commonly known as: ABILIFY     atorvastatin 40 MG tablet  Commonly known as: LIPITOR  Take 1 tablet by mouth nightly     busPIRone 15 MG tablet  Commonly known as: BUSPAR     DULoxetine 30 MG extended release capsule  Commonly known as: CYMBALTA     esomeprazole Magnesium 20 MG Pack  Commonly known as: NEXIUM     gabapentin 100 MG capsule  Commonly known as: NEURONTIN     hydroxychloroquine 200 MG tablet  Commonly known as: PLAQUENIL     insulin aspart 100 UNIT/ML injection vial  Commonly known as: NOVOLOG  basal rate 12a 1.0, 8a 1.55, 10p 1.0, CR 12, ISF 45, goal 100-150, active insulin time 3 hrs.     levothyroxine 100 MCG tablet  Commonly known as: SYNTHROID  Take 1 tablet by mouth in the morning.      lidocaine viscous hcl 2 % Soln solution  Commonly known as: XYLOCAINE  Use small amount vaginally as needed for pain. Liletta (52 MG) 20.1 MCG/DAY Iud IUD 52 mg  Generic drug: levonorgestrel     losartan 50 MG tablet  Commonly known as: COZAAR     meloxicam 7.5 MG tablet  Commonly known as: MOBIC     * Omnipod DASH PDM (Gen 4) Kit  To continuously administer inuslin     * Omnipod DASH Pods (Gen 4) Misc  USE TO CONTINUOUSLY        ADMINISTER INSULIN AND     CHANGE EVERY 3 DAYS     * Omnipod 5 G6 Pod (Gen 5) Misc  Change pod every 72 hours     * Omnipod 5 G6 Intro (Gen 5) Kit  Use to infuse insulin     OneTouch Ultra strip  Generic drug: blood glucose test strips  USE TO TEST FOUR TIMES DAILY     traZODone 100 MG tablet  Commonly known as: DESYREL     Vitamin D3 50 MCG (2000 UT) Caps           * This list has 4 medication(s) that are the same as other medications prescribed for you. Read the directions carefully, and ask your doctor or other care provider to review them with you. ASK your doctor about these medications      pantoprazole 40 MG tablet  Commonly known as: PROTONIX  Take 1 tablet by mouth every morning (before breakfast)               Where to Get Your Medications        You can get these medications from any pharmacy    Bring a paper prescription for each of these medications  levoFLOXacin 500 MG tablet         Time Spent on discharge is 45 minutes in the review of medications and discharge plan  and EXAM.    +++++++++++++++++++++++++++++++++++++++++++++++++  Anibal Ames, DO  [address], [place]  +++++++++++++++++++++++++++++++++++++++++++++++++  NOTE: This report was transcribed using voice recognition software. Every effort was made to ensure accuracy; however, inadvertent computerized transcription errors may be present.

## 2022-10-19 NOTE — RESULT ENCOUNTER NOTE
Please inform pt of need for additional imaging on left breast . Also elevated TC scoring 25.3% - offer MRI of bilateral breast

## 2022-10-31 ENCOUNTER — OFFICE VISIT (OUTPATIENT)
Dept: ORTHOPEDIC SURGERY | Age: 42
End: 2022-10-31
Payer: COMMERCIAL

## 2022-10-31 VITALS — HEIGHT: 64 IN | BODY MASS INDEX: 27.31 KG/M2 | RESPIRATION RATE: 20 BRPM | WEIGHT: 160 LBS

## 2022-10-31 DIAGNOSIS — G56.31 RADIAL TUNNEL SYNDROME OF RIGHT UPPER EXTREMITY: Primary | ICD-10-CM

## 2022-10-31 PROCEDURE — G8427 DOCREV CUR MEDS BY ELIG CLIN: HCPCS | Performed by: ORTHOPAEDIC SURGERY

## 2022-10-31 PROCEDURE — 99213 OFFICE O/P EST LOW 20 MIN: CPT | Performed by: ORTHOPAEDIC SURGERY

## 2022-10-31 PROCEDURE — 1036F TOBACCO NON-USER: CPT | Performed by: ORTHOPAEDIC SURGERY

## 2022-10-31 PROCEDURE — G8484 FLU IMMUNIZE NO ADMIN: HCPCS | Performed by: ORTHOPAEDIC SURGERY

## 2022-10-31 PROCEDURE — G8419 CALC BMI OUT NRM PARAM NOF/U: HCPCS | Performed by: ORTHOPAEDIC SURGERY

## 2022-10-31 PROCEDURE — 1111F DSCHRG MED/CURRENT MED MERGE: CPT | Performed by: ORTHOPAEDIC SURGERY

## 2022-10-31 NOTE — PROGRESS NOTES
Chief Complaint   Patient presents with    Joint Swelling     Right elbow pain, cubital tunnel         Stefanie Lozano is a 36y.o. year old  who presents for follow up of conservative EMG. We have seen her in the past for numbness and tingling left hand and recently underwent trigger finger and carpal tunnel injections of the left hand. She has significant past medical history of right lateral epicondylar debridement 5 years ago. Post multiple trigger finger releases, she states she also had a carpal tunnel release many years ago as well. She had complaints of right-sided numbness and tingling an EMG was obtained per Dr. Clyde Castleman. She is here today discuss those findings. She complains of numbness and tingling over the palm of her hand as well as pain over the lateral elbow. The most concerning pain is over the lateral elbow. This issue does wake her up at night. She has tried extension time splinting. This mildly helps her symptoms. Patient had an EMG/NCS dated 6/23/2022    Right median nerve motor latency: 4.22  Left median nerve motor latency: Not tested  Right ulnar nerve velocity: 49 below the elbow, 41 above the elbow  Left ulnar nerve velocity: Not tested  Right median nerve sensory latency: 4.06  Left median nerve sensory latency: Not tested    EMG portion of the exam demonstrates some reduced recruitment in the right sided APB, otherwise no changes in the ulnar nerve distribution. She returns today after completing a course of therapy. She reports therapy is not helping. She reports the pain is mostly anterior lateral elbow. She is status post lateral epicondylar debridement. She has no pain on the lateral condyle. She also recently had urosepsis. She reports that she has been out of the hospital now for about 2 weeks. She reports she is in recovery.       Past Medical History:   Diagnosis Date    Anxiety     Depression     Diabetes mellitus type 1, controlled, without complications ([facility] [address] 6708    Epicondylitis, lateral 2017    right    GERD (gastroesophageal reflux disease)     Hypertension     IBS (irritable bowel syndrome)     Insulin pump status has insulin pump    follows with Dr. Roberta Russell    SIRS (systemic inflammatory response syndrome) ([facility] [address] 2016    Thyroid disease     Trigger finger     right index and little finger     Past Surgical History:   Procedure Laterality Date    CARPAL TUNNEL RELEASE  2011    left hand     DEBRIDEMENT Right 2017    Right lateral epicondylar debridement    ENDOSCOPY, COLON, DIAGNOSTIC      FINGER TRIGGER RELEASE      middle finger bilat, and right thumb  / multiple  / last one 2017    FINGER TRIGGER RELEASE Right 2016    4th finger    FINGER TRIGGER RELEASE Right 4/3/2019    TRIGGER RELEASE RIGHT INDEX AND SMALL FINGER performed by Jerry Strong MD at [address] [place] [address]      exploratory     [place]  DELIVERY ONLY  x2    , Low Cervical    SKIN BIOPSY         Current Outpatient Medications:     ADDERALL XR 20 MG capsule, Take 20 mg by mouth daily. , Disp: , Rfl:     blood glucose test strips (ONETOUCH ULTRA) strip, USE TO TEST FOUR TIMES DAILY, Disp: 150 strip, Rfl: 5    Insulin Disposable Pump (OMNIPOD 5 G6 INTRO, GEN 5,) KIT, Use to infuse insulin, Disp: 1 kit, Rfl: 0    atorvastatin (LIPITOR) 40 MG tablet, Take 1 tablet by mouth nightly, Disp: 30 tablet, Rfl: 3    levothyroxine (SYNTHROID) 100 MCG tablet, Take 1 tablet by mouth in the morning., Disp: 30 tablet, Rfl: 3    pantoprazole (PROTONIX) 40 MG tablet, Take 1 tablet by mouth every morning (before breakfast), Disp: 30 tablet, Rfl: 3    insulin aspart (NOVOLOG) 100 UNIT/ML injection vial, basal rate 12a 1.0, 8a 1.55, 10p 1.0, CR 12, ISF 45, goal 100-150, active insulin time 3 hrs., Disp: 30 mL, Rfl: 11    meloxicam (MOBIC) 7.5 MG tablet, Take 7.5 mg by mouth in the morning and 7.5 mg before bedtime. , Disp: , Rfl:     busPIRone (BUSPAR) 15 MG tablet, Take 15 mg by mouth in the morning and 15 mg before bedtime. , Disp: , Rfl:     traZODone (DESYREL) 100 MG tablet, Take 100 mg by mouth nightly, Disp: , Rfl:     Insulin Disposable Pump (OMNIPOD 5 G6 POD, GEN 5,) MISC, Change pod every 72 hours, Disp: 10 each, Rfl: 3    hydroxychloroquine (PLAQUENIL) 200 MG tablet, Take 200 mg by mouth 2 times daily, Disp: , Rfl:     Levonorgestrel (LILETTA, 52 MG,) IUD 52 mg, 1 each by IntraUTERine route once Inserted 1/19/2022, Disp: , Rfl:     Insulin Disposable Pump (OMNIPOD DASH 5 PACK PODS) MISC, USE TO CONTINUOUSLY        ADMINISTER INSULIN AND     CHANGE EVERY 3 DAYS, Disp: 30 each, Rfl: 3    esomeprazole Magnesium (NEXIUM) 20 MG PACK, Take 40 mg by mouth in the morning., Disp: , Rfl:     lidocaine viscous hcl (XYLOCAINE) 2 % SOLN solution, Use small amount vaginally as needed for pain., Disp: 100 mL, Rfl: 0    Insulin Disposable Pump (OMNIPOD DASH SYSTEM) KIT, To continuously administer inuslin, Disp: 1 kit, Rfl: 0    ARIPiprazole (ABILIFY) 2 MG tablet, Take 2 mg by mouth daily, Disp: , Rfl:     Cholecalciferol (VITAMIN D3) 2000 UNITS CAPS, Take 25,000 Units by mouth once a week wed, Disp: , Rfl:     gabapentin (NEURONTIN) 100 MG capsule, Take 300 mg by mouth in the morning and 300 mg in the evening.  Takes nightly., Disp: , Rfl:     losartan (COZAAR) 50 MG tablet, Take 50 mg by mouth daily, Disp: , Rfl: 0    DULoxetine (CYMBALTA) 30 MG capsule, Take 60 mg by mouth 2 times daily , Disp: , Rfl:   Allergies   Allergen Reactions    Penicillins Hives    Sulfa Antibiotics Rash     fever     Social History     Socioeconomic History    Marital status:      Spouse name: Not on file    Number of children: Not on file    Years of education: Not on file    Highest education level: Not on file   Occupational History    Not on file   Tobacco Use    Smoking status: Never    Smokeless tobacco: Never   Vaping Use    Vaping Use: Never used   Substance and Sexual Activity Alcohol use: Yes     Alcohol/week: 0.0 standard drinks     Comment: socially    Drug use: No    Sexual activity: Not on file   Other Topics Concern    Not on file   Social History Narrative    Not on file     Social Determinants of Health     Financial Resource Strain: Not on file   Food Insecurity: Not on file   Transportation Needs: Not on file   Physical Activity: Not on file   Stress: Not on file   Social Connections: Not on file   Intimate Partner Violence: Not on file   Housing Stability: Not on file     Family History   Problem Relation Age of Onset    Breast Cancer Mother     Cancer Mother     High Blood Pressure Maternal Grandmother        Skin: Acute skin changes  Musculoskeletal: Numbness and tingling right upper extremity, lateral elbow pain. Neurologic: Numbness and tingling  Cardiovascular: (No acute changes  SUBJECTIVE:      Constitutional:    The patient is alert and oriented x 3, appears to be stated age and in no distress. Right upper extremity:  Gross examination there is no significant swelling or deformity cruciated. Nontender directly over the lateral epicondyle. She is tender distal to the scar laterally. However her pain is greater over the anterior lateral elbow region along the radial tunnel. Patient does have significant tenderness palpation over the mobile wad. Previous lateral epicondylar incision well-healed no tenderness over the incision. No tenderness over the insertion site of the lateral epicondyle. She has no pain with resisted extension. She does have a positive Tinel's of the cubital tunnel with radiating symptoms in the ulnar nerve distribution. On flexion-extension I do not appreciate a subluxing episode of her ulnar nerve. Minimal tenderness over the lacertus, negative Tinel's sign at the carpal tunnel. Radial, ulnar, median nerves are intact to light touch distally.   Motor testing 5/5 wrist flexors, wrist extension, finger extensors and digital flexors and intrinsics. X-rays of the right elbow dated 5/24/2022 were reviewed. Negative for acute fracture dislocations. Impression:   Right-sided cubital tunnel  Right radial tunnel as well. Anxiety and depression  GERD  Hypertension  Insulin-dependent diabetes  Thyroid disease    Plan: Today's findings were explained the patient. Given her ongoing persistent symptoms recommended an MRI of the elbow for surgical planning. Discussed radial tunnel decompression as well as ulnar nerve decompression as well as the postoperative recovery. All questions answered. Discussed possible nerve palsy postop. She understands and will follow-up after the MRI is completed. I explained the risks, benefits, alternatives and complications of surgery with the patient including but not limited to the risks of infection, possible damage to nerves, vessels, or tendons, stiffness, loss of range of motion, scar sensitivity, wound healing complications, worsening symptoms, possible need for therapy, as well as the possible need further surgery and unanticipated complications. The patient voiced understanding and all questions were answered. The patient elected to proceed with surgical intervention.      Yi Infante MD  10/31/2022

## 2022-11-04 DIAGNOSIS — F40.240 CLAUSTROPHOBIA: Primary | ICD-10-CM

## 2022-11-04 RX ORDER — LORAZEPAM 0.5 MG/1
0.5 TABLET ORAL EVERY 8 HOURS PRN
Qty: 3 TABLET | Refills: 0
Start: 2022-11-04 | End: 2022-11-05

## 2022-11-14 ENCOUNTER — HOSPITAL ENCOUNTER (OUTPATIENT)
Dept: GENERAL RADIOLOGY | Age: 42
Discharge: HOME | End: 2022-11-16
Payer: COMMERCIAL

## 2022-11-14 VITALS — HEIGHT: 64 IN | WEIGHT: 160 LBS | BODY MASS INDEX: 27.31 KG/M2

## 2022-11-14 DIAGNOSIS — R92.2 DENSE BREAST TISSUE ON MAMMOGRAM: ICD-10-CM

## 2022-11-14 DIAGNOSIS — R92.2 INCONCLUSIVE MAMMOGRAM: ICD-10-CM

## 2022-11-14 PROCEDURE — 77065 DX MAMMO INCL CAD UNI: CPT

## 2022-11-14 PROCEDURE — G0279 TOMOSYNTHESIS, MAMMO: HCPCS

## 2022-11-15 DIAGNOSIS — E10.9 TYPE 1 DIABETES MELLITUS WITHOUT COMPLICATION (HCC): ICD-10-CM

## 2022-11-16 DIAGNOSIS — E10.9 TYPE 1 DIABETES MELLITUS WITHOUT COMPLICATION (HCC): ICD-10-CM

## 2022-11-17 RX ORDER — INSULIN PMP CART,AUT,G6/7,CNTR
EACH SUBCUTANEOUS
OUTPATIENT
Start: 2022-11-17

## 2022-11-18 RX ORDER — INSULIN PMP CART,AUT,G6/7,CNTR
EACH SUBCUTANEOUS
OUTPATIENT
Start: 2022-11-18

## 2022-11-22 ENCOUNTER — CLINICAL DOCUMENTATION (OUTPATIENT)
Dept: GENERAL RADIOLOGY | Age: 42
End: 2022-11-22

## 2022-12-14 ENCOUNTER — HOSPITAL ENCOUNTER (OUTPATIENT)
Dept: MRI IMAGING | Age: 42
Discharge: HOME | End: 2022-12-16
Payer: COMMERCIAL

## 2022-12-14 DIAGNOSIS — G56.31 RADIAL TUNNEL SYNDROME OF RIGHT UPPER EXTREMITY: ICD-10-CM

## 2022-12-14 DIAGNOSIS — Z91.89 AT HIGH RISK FOR BREAST CANCER: ICD-10-CM

## 2022-12-14 DIAGNOSIS — Z80.3 FAMILY HISTORY OF BREAST CANCER IN MOTHER: ICD-10-CM

## 2022-12-14 DIAGNOSIS — R92.2 DENSE BREAST TISSUE ON MAMMOGRAM: ICD-10-CM

## 2022-12-14 PROCEDURE — C8908 MRI W/O FOL W/CONT, BREAST,: HCPCS

## 2022-12-14 PROCEDURE — 73221 MRI JOINT UPR EXTREM W/O DYE: CPT

## 2022-12-14 PROCEDURE — A9585 GADOBUTROL INJECTION: HCPCS | Performed by: RADIOLOGY

## 2022-12-14 PROCEDURE — 6360000004 HC RX CONTRAST MEDICATION: Performed by: RADIOLOGY

## 2022-12-14 RX ADMIN — GADOBUTROL 7 ML: 604.72 INJECTION INTRAVENOUS at 09:20

## 2023-01-31 ENCOUNTER — TELEPHONE (OUTPATIENT)
Dept: ENDOCRINOLOGY | Age: 43
End: 2023-01-31

## 2023-02-06 ENCOUNTER — OFFICE VISIT (OUTPATIENT)
Dept: ENDOCRINOLOGY | Age: 43
End: 2023-02-06
Payer: COMMERCIAL

## 2023-02-06 VITALS
SYSTOLIC BLOOD PRESSURE: 164 MMHG | BODY MASS INDEX: 27.83 KG/M2 | OXYGEN SATURATION: 100 % | WEIGHT: 163 LBS | HEIGHT: 64 IN | HEART RATE: 91 BPM | DIASTOLIC BLOOD PRESSURE: 92 MMHG

## 2023-02-06 DIAGNOSIS — E55.9 VITAMIN D DEFICIENCY: ICD-10-CM

## 2023-02-06 DIAGNOSIS — E16.2 HYPOGLYCEMIA: ICD-10-CM

## 2023-02-06 DIAGNOSIS — E03.9 HYPOTHYROIDISM, UNSPECIFIED TYPE: ICD-10-CM

## 2023-02-06 DIAGNOSIS — E10.9 TYPE 1 DIABETES MELLITUS WITHOUT COMPLICATION (HCC): ICD-10-CM

## 2023-02-06 DIAGNOSIS — Z96.41 INSULIN PUMP IN PLACE: ICD-10-CM

## 2023-02-06 DIAGNOSIS — E10.9 TYPE 1 DIABETES MELLITUS WITHOUT COMPLICATION (HCC): Primary | ICD-10-CM

## 2023-02-06 LAB
ALBUMIN SERPL-MCNC: 4.6 G/DL (ref 3.5–5.2)
ALP BLD-CCNC: 74 U/L (ref 35–104)
ALT SERPL-CCNC: 11 U/L (ref 0–32)
ANION GAP SERPL CALCULATED.3IONS-SCNC: 17 MMOL/L (ref 7–16)
AST SERPL-CCNC: 19 U/L (ref 0–31)
BILIRUB SERPL-MCNC: 0.8 MG/DL (ref 0–1.2)
BUN BLDV-MCNC: 8 MG/DL (ref 6–20)
CALCIUM SERPL-MCNC: 9.7 MG/DL (ref 8.6–10.2)
CHLORIDE BLD-SCNC: 97 MMOL/L (ref 98–107)
CHOLESTEROL, TOTAL: 150 MG/DL (ref 0–199)
CO2: 25 MMOL/L (ref 22–29)
CREAT SERPL-MCNC: 0.9 MG/DL (ref 0.5–1)
CREATININE URINE: 41 MG/DL (ref 29–226)
GFR SERPL CREATININE-BSD FRML MDRD: >60 ML/MIN/1.73
GLUCOSE BLD-MCNC: 220 MG/DL (ref 74–99)
HBA1C MFR BLD: 6.7 %
HDLC SERPL-MCNC: 63 MG/DL
LDL CHOLESTEROL CALCULATED: 60 MG/DL (ref 0–99)
MICROALBUMIN UR-MCNC: <12 MG/L
MICROALBUMIN/CREAT UR-RTO: ABNORMAL (ref 0–30)
POTASSIUM SERPL-SCNC: 4.1 MMOL/L (ref 3.5–5)
SODIUM BLD-SCNC: 139 MMOL/L (ref 132–146)
T4 FREE: 1.58 NG/DL (ref 0.93–1.7)
TOTAL PROTEIN: 7.8 G/DL (ref 6.4–8.3)
TRIGL SERPL-MCNC: 136 MG/DL (ref 0–149)
TSH SERPL DL<=0.05 MIU/L-ACNC: 2.06 UIU/ML (ref 0.27–4.2)
VITAMIN D 25-HYDROXY: 43 NG/ML (ref 30–100)
VLDLC SERPL CALC-MCNC: 27 MG/DL

## 2023-02-06 PROCEDURE — G8419 CALC BMI OUT NRM PARAM NOF/U: HCPCS | Performed by: CLINICAL NURSE SPECIALIST

## 2023-02-06 PROCEDURE — G8484 FLU IMMUNIZE NO ADMIN: HCPCS | Performed by: CLINICAL NURSE SPECIALIST

## 2023-02-06 PROCEDURE — 99214 OFFICE O/P EST MOD 30 MIN: CPT | Performed by: CLINICAL NURSE SPECIALIST

## 2023-02-06 PROCEDURE — 95251 CONT GLUC MNTR ANALYSIS I&R: CPT | Performed by: CLINICAL NURSE SPECIALIST

## 2023-02-06 PROCEDURE — 3044F HG A1C LEVEL LT 7.0%: CPT | Performed by: CLINICAL NURSE SPECIALIST

## 2023-02-06 PROCEDURE — 3080F DIAST BP >= 90 MM HG: CPT | Performed by: CLINICAL NURSE SPECIALIST

## 2023-02-06 PROCEDURE — 1036F TOBACCO NON-USER: CPT | Performed by: CLINICAL NURSE SPECIALIST

## 2023-02-06 PROCEDURE — 3077F SYST BP >= 140 MM HG: CPT | Performed by: CLINICAL NURSE SPECIALIST

## 2023-02-06 PROCEDURE — G8427 DOCREV CUR MEDS BY ELIG CLIN: HCPCS | Performed by: CLINICAL NURSE SPECIALIST

## 2023-02-06 PROCEDURE — 2022F DILAT RTA XM EVC RTNOPTHY: CPT | Performed by: CLINICAL NURSE SPECIALIST

## 2023-02-06 PROCEDURE — 83036 HEMOGLOBIN GLYCOSYLATED A1C: CPT | Performed by: CLINICAL NURSE SPECIALIST

## 2023-02-06 NOTE — PROGRESS NOTES
[address] [place] [address] Department of Endocrinology Diabetes and Metabolism   [address] [facility] [address]   Phone: [phone]  Fax: [phone]    Date of Service: 2/6/2023  Primary Care Physician: Rubén Carmona DO  Provider: JOSEPH Briggs      Reason for the visit:  DM type 1 on Omnipod insulin pump, hypothyroidism     History of Present Illness: The history is provided by the patient. No  was used. Accuracy of the patient data is excellent. Jenny Muse is a very pleasant 36 y.o. female seen today for follow up visit     Jenny Muse was diagnosed with diabetes at age 2  Omnipod 5 pump and DEXCOM cgm. She is not in automatic mode . Current pump settings: basal rate 12a 0.8, 7a 1.2, 9p 0.8, CR 11, ISF 12a 40, BG target 110 active insulin time 2 hrs    The patient has been checking blood sugar multiple times a day using DEXCOM   Dexcom reviewed  Average   Time in range 27%  Hyperglycemic 72%  Less than 1% hypoglycemia    Lab Results   Component Value Date/Time    LABA1C 6.7 02/06/2023 02:28 PM    LABA1C 7.0 10/13/2022 04:55 AM    LABA1C 6.6 08/14/2022 11:32 AM     Patient reported infrequent hypoglycemic episodes  The patient has been mindful of what has been eating and following diabetic diet as encouraged  I reviewed current medications and the patient has no issues with diabetes medications  Jenny Muse is up to date with eye exam and denied any history of diabetic retinopathy   The patient seeing performs her own feet care  Microvascular complications:  No Retinopathy, no Nephropathy + Neuropathy   Macrovascular complications: no CAD, PVD, or Stroke  The patient receives Flushot every year    Recently hospitalized for DKA  Adjustments to her pump were made at that time     Hypothyroidism    On levothyroxine 100 mcg daily.  Patient takes levothyroxine in the morning at empty stomach, wait one hour before eating , avoid multivitamins containing calcium  or iron with it. Dose recently increased     Lab Results   Component Value Date    TSH 9.530 (H) 2022    J5PHPJM 103.80 2015    FT3 2.8 2015    T4FREE 1.48 2022       PAST MEDICAL HISTORY   Past Medical History:   Diagnosis Date    Anxiety     Depression     Diabetes mellitus type 1, controlled, without complications ([facility] [address]     Epicondylitis, lateral 2017    right    GERD (gastroesophageal reflux disease)     Hypertension     IBS (irritable bowel syndrome)     Insulin pump status has insulin pump    follows with Dr. Fran Newman    [facility]    Thyroid disease     Trigger finger     right index and little finger       PAST SURGICAL HISTORY   Past Surgical History:   Procedure Laterality Date    CARPAL TUNNEL RELEASE      left hand     DEBRIDEMENT Right 2017    Right lateral epicondylar debridement    ENDOSCOPY, COLON, DIAGNOSTIC      FINGER TRIGGER RELEASE      middle finger bilat, and right thumb  / multiple  / last one 2017    FINGER TRIGGER RELEASE Right 2016    4th finger    FINGER TRIGGER RELEASE Right 2019    TRIGGER RELEASE RIGHT INDEX AND SMALL FINGER performed by Laura Humphries MD at [address] [place] [address]      exploratory     [place]  DELIVERY ONLY  x2    , Low Cervical    SKIN BIOPSY         SOCIAL HISTORY   Tobacco:   reports that she has never smoked. She has never used smokeless tobacco.  Alcohol:   reports current alcohol use. Drugs:   reports no history of drug use.     FAMILY HISTORY   Family History   Problem Relation Age of Onset    Breast Cancer Mother     Cancer Mother     High Blood Pressure Maternal Grandmother     Cancer Maternal Aunt         lung    Cancer Maternal Uncle         kidney    Cancer Cousin         kidney       ALLERGIES AND DRUG REACTIONS   Allergies   Allergen Reactions    Penicillins Hives    Sulfa Antibiotics Rash fever       CURRENT MEDICATIONS   Current Outpatient Medications   Medication Sig Dispense Refill    Insulin Disposable Pump (OMNIPOD 5 G6 INTRO, GEN 5,) KIT Use to infuse insulin 1 kit 0    insulin aspart (NOVOLOG) 100 UNIT/ML injection vial basal rate 12a 1.0, 8a 1.55, 10p 1.0, CR 12, ISF 45, goal 100-150, active insulin time 3 hrs. 30 mL 11    Insulin Disposable Pump (OMNIPOD 5 G6 POD, GEN 5,) MISC Change pod every 72 hours 10 each 3    ADDERALL XR 20 MG capsule Take 20 mg by mouth daily. blood glucose test strips (ONETOUCH ULTRA) strip USE TO TEST FOUR TIMES DAILY 150 strip 5    atorvastatin (LIPITOR) 40 MG tablet Take 1 tablet by mouth nightly 30 tablet 3    levothyroxine (SYNTHROID) 100 MCG tablet Take 1 tablet by mouth in the morning. 30 tablet 3    pantoprazole (PROTONIX) 40 MG tablet Take 1 tablet by mouth every morning (before breakfast) 30 tablet 3    meloxicam (MOBIC) 7.5 MG tablet Take 7.5 mg by mouth in the morning and 7.5 mg before bedtime. busPIRone (BUSPAR) 15 MG tablet Take 15 mg by mouth in the morning and 15 mg before bedtime. traZODone (DESYREL) 100 MG tablet Take 100 mg by mouth nightly      hydroxychloroquine (PLAQUENIL) 200 MG tablet Take 200 mg by mouth 2 times daily      Levonorgestrel (LILETTA, 52 MG,) IUD 52 mg 1 each by IntraUTERine route once Inserted 1/19/2022      Insulin Disposable Pump (OMNIPOD DASH 5 PACK PODS) MISC USE TO CONTINUOUSLY        ADMINISTER INSULIN AND     CHANGE EVERY 3 DAYS 30 each 3    esomeprazole Magnesium (NEXIUM) 20 MG PACK Take 40 mg by mouth in the morning. lidocaine viscous hcl (XYLOCAINE) 2 % SOLN solution Use small amount vaginally as needed for pain.  100 mL 0    Insulin Disposable Pump (OMNIPOD DASH SYSTEM) KIT To continuously administer inuslin 1 kit 0    ARIPiprazole (ABILIFY) 2 MG tablet Take 2 mg by mouth daily      Cholecalciferol (VITAMIN D3) 2000 UNITS CAPS Take 25,000 Units by mouth once a week wed      gabapentin (NEURONTIN) 100 MG capsule Take 300 mg by mouth in the morning and 300 mg in the evening. Takes nightly. losartan (COZAAR) 50 MG tablet Take 50 mg by mouth daily  0    DULoxetine (CYMBALTA) 30 MG capsule Take 60 mg by mouth 2 times daily        No current facility-administered medications for this visit. Review of Systems  Constitutional: No fever, no chills, no diaphoresis, no generalized weakness. HEENT: No blurred vision, No sore throat, no ear pain, no hair loss  Neck: denied any neck swelling, difficulty swallowing,   Cardio-pulmonary: No CP, SOB or palpitation, No orthopnea or PND. No cough or wheezing. GI: No N/V/D, no constipation, No abdominal pain, no melena or hematochezia   : Denied any dysuria, hematuria, flank pain, discharge, or incontinence. Skin: denied any rash, ulcer, Hirsute, or hyperpigmentation. MSK: denied any joint deformity, joint pain/swelling, muscle pain, or back pain. Neuro: no numbness, no tingling, no weakness, _    OBJECTIVE    BP (!) 164/92   Pulse 91   Ht 5' 4\" (1.626 m)   Wt 163 lb (73.9 kg)   SpO2 100%   BMI 27.98 kg/m²   BP Readings from Last 4 Encounters:   02/06/23 (!) 164/92   10/28/22 (!) 150/86   10/17/22 130/67   10/14/22 122/62     Wt Readings from Last 6 Encounters:   02/06/23 163 lb (73.9 kg)   11/14/22 160 lb (72.6 kg)   10/31/22 160 lb (72.6 kg)   10/28/22 162 lb (73.5 kg)   10/17/22 164 lb (74.4 kg)   10/14/22 171 lb (77.6 kg)     Physical examination:  General: awake alert, oriented x3, no abnormal position or movements. HEENT: normocephalic non traumatic, no exophthalmos   Neck: supple, no LN enlargement, no thyromegaly, no thyroid tenderness, no JVD. Pulm: Clear equal air entry no added sounds, no wheezing or rhonchi    CVS: S1 + S2, no murmur, no heave. Dorsalis pedis pulse palpable   Abd: soft lax, no tenderness, no organomegaly, audible bowel sounds. Skin: warm, no lesions, no rash.  No callus, no Ulcers, No acanthosis nigricans   Neuro: CN intact, sensation present bilateral , muscle power normal  Psych: normal mood, and affect    Review of Laboratory Data:  I personally reviewed the following lab:  Lab Results   Component Value Date/Time    WBC 11.1 10/13/2022 04:55 AM    RBC 3.73 10/13/2022 04:55 AM    HGB 11.8 10/13/2022 04:55 AM    HCT 34.6 10/13/2022 04:55 AM    MCV 92.8 10/13/2022 04:55 AM    MCH 31.6 10/13/2022 04:55 AM    MCHC 34.1 10/13/2022 04:55 AM    RDW 12.8 10/13/2022 04:55 AM     10/13/2022 04:55 AM    MPV 10.1 10/13/2022 04:55 AM      Lab Results   Component Value Date/Time     10/14/2022 03:46 AM    K 3.6 10/14/2022 03:46 AM    K 4.2 10/11/2022 04:50 PM    CO2 24 10/14/2022 03:46 AM    BUN 4 (L) 10/14/2022 03:46 AM    CREATININE 0.8 10/14/2022 03:46 AM    CALCIUM 8.6 10/14/2022 03:46 AM    LABGLOM >60 10/14/2022 03:46 AM    GFRAA >60 10/14/2022 03:46 AM      Lab Results   Component Value Date/Time    TSH 9.530 (H) 08/13/2022 09:28 AM    T4FREE 1.48 08/13/2022 09:28 AM    FT3 2.8 12/03/2015 07:00 AM    L4QGCZA 103.80 12/03/2015 07:00 AM     Lab Results   Component Value Date/Time    LABA1C 6.7 02/06/2023 02:28 PM    GLUCOSE 164 10/14/2022 03:46 AM    MALBCR - 03/24/2022 09:15 AM    LABMICR <12.0 03/24/2022 09:15 AM    LABCREA 74 03/24/2022 09:15 AM     Lab Results   Component Value Date/Time    LABA1C 6.7 02/06/2023 02:28 PM    LABA1C 7.0 10/13/2022 04:55 AM    LABA1C 6.6 08/14/2022 11:32 AM     Lab Results   Component Value Date/Time    TRIG 120 04/07/2021 07:47 AM    HDL 51 04/07/2021 07:47 AM    LDLCALC 96 04/07/2021 07:47 AM    CHOL 171 04/07/2021 07:47 AM     Lab Results   Component Value Date/Time    VITD25 24 03/24/2022 09:01 AM    VITD25 22 04/07/2021 07:47 AM     ASSESSMENT & RECOMMENDATIONS   Marlea Kurtz, a 36 y.o.-old female seen in for the following issues     Diabetes Mellitus Type 1     Patient diabetes variable.   Hba1c 6.7  Dexcom reviewed  Blood sugars higher in the middle of the night and evening  Will increase basal rate at midnight to 1.0 and 9 PM to 1.0  Add carb ratio at 5 PM of 10  Patient has OmniPod but is not an automatic mode as Dexcom and OmniPod are not communicating  Patient will reach out to [facility] for additional training  Very infrequent hypoglycemic episodes  Insulin pump settings will be:  basal rate 12a 1.0, 7a 1.2, 9p 1.0, CR 0000 11, 4 pm 10 ISF 12a 40, active insulin time 2 hrs     Discussed with patient A1c and blood sugar goals   Patient up to date with the routine diabetes maintenance and prevention  Check labs     Continuous Glucose Monitoring (CGM) download and interpretation   I personally reviewed and interpreted continuous glucose monitor (CGM) download. CGM report was discussed with patient including blood glucose patterns, percentages of blood glucose at goal, above goal and below goal. Insulin dosages/antidiabetic regimen was adjusted according to CGM download. Full CGM was scanned under media. Hypoglycemia   Continue using DEXCOM CGM  Counseled on treatment of hypoglycemia    Primary Hypothyroidism   On Levothyroxine 100 mcg daily  Check labs and adjust as needed    Vitamin D deficiency  On once weekly vitamin D  Managed per rheumatology  Check vitamin D        I personally spent > 30 minutes  reviewing  external notes from PCP and other patient's care team providers, and personally interpreted labs associated with the above diagnosis. I also ordered labs to further assess and manage the above addressed medical conditions    No follow-ups on file. The above issues were reviewed with the patient who understood and agreed with the plan. Thank you for allowing us to participate in the care of this patient. Please do not hesitate to contact us with any additional questions. Diagnosis Orders   1.  Type 1 diabetes mellitus without complication (HCC)  POCT glycosylated hemoglobin (Hb A1C)    Comprehensive Metabolic Panel    Lipid Panel    Microalbumin / Creatinine Urine Ratio      2. Insulin pump in place        3. Hypoglycemia        4. Hypothyroidism, unspecified type  TSH    T4, Free      5. Vitamin D deficiency  Vitamin D 25 Hydroxy          JOSEPH Gómez [facility] Diabetes Care and Endocrinology   [address] [facility] [address]   Phone: [phone]  Fax: [phone]  ----------------------------  An electronic signature was used to authenticate this note. JOSEPH Gómez  on 2/6/2023 at 2:29 PM

## 2023-02-08 ENCOUNTER — TELEPHONE (OUTPATIENT)
Dept: ENDOCRINOLOGY | Age: 43
End: 2023-02-08

## 2023-02-08 NOTE — TELEPHONE ENCOUNTER
----- Message from JOSEPH Dewitt - CNS sent at 2/7/2023  7:52 AM EST -----  Please call patient and inform her I have reviewed labs.   Cholesterol is at goal.  Thyroid function is at goal.  Vitamin D is normal.  Continue same

## 2023-02-15 DIAGNOSIS — E10.9 TYPE 1 DIABETES MELLITUS WITHOUT COMPLICATION (HCC): Primary | ICD-10-CM

## 2023-02-15 DIAGNOSIS — E03.9 HYPOTHYROIDISM, UNSPECIFIED TYPE: ICD-10-CM

## 2023-02-16 RX ORDER — LEVOTHYROXINE SODIUM 0.1 MG/1
100 TABLET ORAL DAILY
Qty: 90 TABLET | Refills: 1 | Status: SHIPPED | OUTPATIENT
Start: 2023-02-16

## 2023-02-23 ENCOUNTER — HOSPITAL ENCOUNTER (OUTPATIENT)
Dept: GENERAL RADIOLOGY | Age: 43
Discharge: HOME | End: 2023-02-25
Payer: COMMERCIAL

## 2023-02-23 ENCOUNTER — HOSPITAL ENCOUNTER (OUTPATIENT)
Age: 43
Discharge: HOME | End: 2023-02-25
Payer: COMMERCIAL

## 2023-02-23 DIAGNOSIS — M25.512 LEFT SHOULDER PAIN, UNSPECIFIED CHRONICITY: ICD-10-CM

## 2023-02-23 PROCEDURE — 73030 X-RAY EXAM OF SHOULDER: CPT

## 2023-02-27 ENCOUNTER — OFFICE VISIT (OUTPATIENT)
Dept: ORTHOPEDIC SURGERY | Age: 43
End: 2023-02-27
Payer: COMMERCIAL

## 2023-02-27 VITALS — HEIGHT: 64 IN | BODY MASS INDEX: 27.31 KG/M2 | WEIGHT: 160 LBS

## 2023-02-27 DIAGNOSIS — G56.21 ULNAR NEUROPATHY AT ELBOW, RIGHT: ICD-10-CM

## 2023-02-27 DIAGNOSIS — G56.31 RADIAL TUNNEL SYNDROME OF RIGHT UPPER EXTREMITY: Primary | ICD-10-CM

## 2023-02-27 PROCEDURE — 99214 OFFICE O/P EST MOD 30 MIN: CPT | Performed by: ORTHOPAEDIC SURGERY

## 2023-02-27 PROCEDURE — G8427 DOCREV CUR MEDS BY ELIG CLIN: HCPCS | Performed by: ORTHOPAEDIC SURGERY

## 2023-02-27 PROCEDURE — 1036F TOBACCO NON-USER: CPT | Performed by: ORTHOPAEDIC SURGERY

## 2023-02-27 PROCEDURE — G8419 CALC BMI OUT NRM PARAM NOF/U: HCPCS | Performed by: ORTHOPAEDIC SURGERY

## 2023-02-27 PROCEDURE — G8484 FLU IMMUNIZE NO ADMIN: HCPCS | Performed by: ORTHOPAEDIC SURGERY

## 2023-02-27 RX ORDER — FOLIC ACID 1 MG/1
1 TABLET ORAL DAILY
COMMUNITY
Start: 2022-12-05

## 2023-02-27 NOTE — PROGRESS NOTES
Department of Orthopedic Surgery  History and Physical      CHIEF COMPLAINT:  MRI follow up    HISTORY OF PRESENT ILLNESS:                The patient is a 36 y.o. female who presents after her MRI to the right elbow. We have seen her in the past for numbness and tingling left hand and recently underwent trigger finger and carpal tunnel injections of the left hand. She has significant past medical history of right lateral epicondylar debridement 5 years ago. Post multiple trigger finger releases, she states she also had a carpal tunnel release many years ago as well. She had complaints of right-sided numbness and tingling an EMG was obtained per Dr. Gentry Stearns. She is here today discuss those findings. She complains of numbness and tingling over the palm of her hand as well as pain over the lateral elbow. The most concerning pain is over the lateral elbow. This issue does wake her up at night. She has tried extension time splinting. This mildly helps her symptoms. Patient had an EMG/NCS dated 6/23/2022     Right median nerve motor latency: 4.22  Left median nerve motor latency: Not tested  Right ulnar nerve velocity: 49 below the elbow, 41 above the elbow  Left ulnar nerve velocity: Not tested  Right median nerve sensory latency: 4.06  Left median nerve sensory latency: Not tested    EMG portion of the exam demonstrates some reduced recruitment in the right sided APB, otherwise no changes in the ulnar nerve distribution. Patient completed therapy and continued to have pain to the lateral aspect of the right elbow. Patient had an MRI for evaluation of possible radial tunnel syndrome. She continues to have numbness and tingling to her right ring and small fingers. She does not have pain to her lateral epicondyle.      Past Medical History:        Diagnosis Date    Anxiety     Depression     Diabetes mellitus type 1, controlled, without complications ([facility] [address] 0/7/0474    Epicondylitis, lateral 02/2017 right    GERD (gastroesophageal reflux disease)     Hypertension     IBS (irritable bowel syndrome)     Insulin pump status has insulin pump    follows with Dr. Iwona Franklin    SIRS (systemic inflammatory response syndrome) ([facility] [address] 2016    Thyroid disease     Trigger finger     right index and little finger     Past Surgical History:        Procedure Laterality Date    CARPAL TUNNEL RELEASE      left hand     DEBRIDEMENT Right 2017    Right lateral epicondylar debridement    ENDOSCOPY, COLON, DIAGNOSTIC      FINGER TRIGGER RELEASE      middle finger bilat, and right thumb  / multiple  / last one 2017    FINGER TRIGGER RELEASE Right 2016    4th finger    FINGER TRIGGER RELEASE Right 2019    TRIGGER RELEASE RIGHT INDEX AND SMALL FINGER performed by Eleuterio Pineda MD at [address] [place] [address]  2009    exploratory     [place]  DELIVERY ONLY  x2    , Low Cervical    SKIN BIOPSY       Current Medications:   No current facility-administered medications for this visit. Allergies:  Penicillins and Sulfa antibiotics    Social History:   TOBACCO:   reports that she has never smoked. She has never used smokeless tobacco.  ETOH:   reports current alcohol use. DRUGS:   reports no history of drug use.   ACTIVITIES OF DAILY LIVING:    OCCUPATION:    Family History:       Problem Relation Age of Onset    Breast Cancer Mother     Cancer Mother     High Blood Pressure Maternal Grandmother     Cancer Maternal Aunt         lung    Cancer Maternal Uncle         kidney    Cancer Cousin         kidney       REVIEW OF SYSTEMS:  CONSTITUTIONAL:  negative  EYES:  negative  HEENT:  negative  RESPIRATORY:  negative  CARDIOVASCULAR:  negative  GASTROINTESTINAL:  negative  GENITOURINARY:  negative  INTEGUMENT/BREAST:  negative  HEMATOLOGIC/LYMPHATIC:  negative  ALLERGIC/IMMUNOLOGIC:  negative  ENDOCRINE:  DM  MUSCULOSKELETAL:  right elbow pain  NEUROLOGICAL:  right upper extremity numbness and tingling  BEHAVIOR/PSYCH:  negative    PHYSICAL EXAM:    VITALS:  Ht 5' 4\" (1.626 m)   Wt 160 lb (72.6 kg)   BMI 27.46 kg/m²   CONSTITUTIONAL:  awake, alert, cooperative, no apparent distress, and appears stated age  EYES:  Lids and lashes normal, pupils equal, round and reactive to light, extra ocular muscles intact, sclera clear, conjunctiva normal  ENT:  Normocephalic, without obvious abnormality, atraumatic, sinuses nontender on palpation, external ears without lesions, oral pharynx with moist mucus membranes, tonsils without erythema or exudates, gums normal and good dentition. NECK:  Supple, symmetrical, trachea midline, no adenopathy, thyroid symmetric, not enlarged and no tenderness, skin normal  LUNGS:  CTA  CARDIOVASCULAR:  2+ radial pulses, extremities warm and well perfused  ABDOMEN:  NTTP  CHEST:  Atraumatic   GENITAL/URINARY:  deferred  NEUROLOGIC:  Awake, alert, oriented to name, place and time. Cranial nerves II-XII are grossly intact. Motor is 5 out of 5 bilaterally. Sensory is intact.  gait is normal.  MUSCULOSKELETAL:    Right upper extremity: Gross examination there is no significant swelling or deformity cruciated. Nontender directly over the lateral epicondyle. She is tender distal to the scar laterally. However her pain is greater over the anterior lateral elbow region along the radial tunnel. Patient does have significant tenderness palpation over the mobile wad. Previous lateral epicondylar incision well-healed no tenderness over the incision. No tenderness over the insertion site of the lateral epicondyle. She has no pain with resisted extension. She does have a positive Tinel's of the cubital tunnel with radiating symptoms in the ulnar nerve distribution. On flexion-extension I do not appreciate a subluxing episode of her ulnar nerve. Minimal tenderness over the lacertus, negative Tinel's sign at the carpal tunnel.  Radial, ulnar, median nerves are intact to light touch distally. Motor testing 5/5 wrist flexors, wrist extension, finger extensors and digital flexors and intrinsics. DATA:    CBC:   Lab Results   Component Value Date/Time    WBC 11.1 10/13/2022 04:55 AM    RBC 3.73 10/13/2022 04:55 AM    HGB 11.8 10/13/2022 04:55 AM    HCT 34.6 10/13/2022 04:55 AM    MCV 92.8 10/13/2022 04:55 AM    MCH 31.6 10/13/2022 04:55 AM    MCHC 34.1 10/13/2022 04:55 AM    RDW 12.8 10/13/2022 04:55 AM     10/13/2022 04:55 AM    MPV 10.1 10/13/2022 04:55 AM     PT/INR:  No results found for: PROTIME, INR    Radiology Review:  MRI of the right elbow were reviewed from 12/14/22    FINDINGS:   MEDIAL EPICONDYLE: Common flexor tendon is intact. LATERAL EPICONDYLE: There is susceptibility artifact along the common   extensor tendon extending to the lateral epicondyle insertion, suggesting   sequela of prior intervention. No evidence of an acute tear involving the   common extensor tendon. MEDIAL COLLATERAL LIGAMENT: Ulnar collateral ligament appears intact. LATERAL COLLATERAL LIGAMENT: Lateral collateral ligamentous structures appear   intact. MUSCLES / TENDONS: Biceps, brachialis, and triceps tendons are intact. ULNAR NERVE: Visualized portions of the ulnar, median, and radial nerves are   unremarkable. JOINT SPACES: Small volume elbow joint fluid. No significant degenerative   changes are seen. BONE MARROW: No acute fracture or significant bone marrow edema. SOFT TISSUES: No significant subcutaneous edema is seen. Impression   The visualized portions of the ulnar, median, and radial nerves are   unremarkable. No significant muscle edema or atrophy is seen. Postsurgical changes involving the common extensor tendon. No evidence of an   acute tear. No acute fracture or significant bone marrow edema is seen.        IMPRESSION:  Right ulnar neuropathy  Right radial tunnel syndrome  Anxiety and depression  GERD  Hypertension  Insulin-dependent diabetes  Thyroid disease    PLAN:  Discussed findings with the patient at today's visit. Discussed conservative and surgical management with the patient. Reviewed the patients MRI of the elbow. Recommended a right ulnar nerve decompression at the elbow with possible transposition with radial tunnel decompression as well. Postoperative course explained to the patient. Patient would like to proceed. All questions answered. I explained the risks, benefits, alternatives and complications of surgery with the patient including but not limited to the risks of infection, possible damage to nerves, vessels, or tendons, stiffness, loss of range of motion, scar sensitivity, wound healing complications, worsening symptoms, possible need for therapy, as well as the possible need further surgery and unanticipated complications. The patient voiced understanding and all questions were answered. The patient elected to proceed with surgical intervention. I have seen and evaluated the patient and agree with the above assessment and plan on today's visit. I have performed the key components of the history and physical examination with significant findings of persistent right anterior forearm pain and numbness and tingling in the ring and little fingers. MRI results were negative for any mass effect on the radial tunnel. Given these findings she is interested in pursuing ulnar nerve decompression at the elbow possible transposition as well as radial tunnel decompression. Postoperative course was explained. Risk of posterior osseous nerve/radial nerve injury were explained in detail. . I concur with the findings and plan as documented.     Loreto Gifford MD  2/27/2023

## 2023-03-12 DIAGNOSIS — E10.9 TYPE 1 DIABETES MELLITUS WITHOUT COMPLICATION (HCC): ICD-10-CM

## 2023-03-14 RX ORDER — INSULIN PMP CART,AUT,G6/7,CNTR
EACH SUBCUTANEOUS
Qty: 40 EACH | Refills: 4 | Status: SHIPPED | OUTPATIENT
Start: 2023-03-14

## 2023-04-07 ENCOUNTER — PREP FOR PROCEDURE (OUTPATIENT)
Dept: ORTHOPEDIC SURGERY | Age: 43
End: 2023-04-07

## 2023-04-07 RX ORDER — SODIUM CHLORIDE 0.9 % (FLUSH) 0.9 %
5-40 SYRINGE (ML) INJECTION EVERY 12 HOURS SCHEDULED
Status: CANCELLED | OUTPATIENT
Start: 2023-04-07

## 2023-04-07 RX ORDER — SODIUM CHLORIDE 0.9 % (FLUSH) 0.9 %
5-40 SYRINGE (ML) INJECTION PRN
Status: CANCELLED | OUTPATIENT
Start: 2023-04-07

## 2023-04-07 RX ORDER — SODIUM CHLORIDE 9 MG/ML
INJECTION, SOLUTION INTRAVENOUS CONTINUOUS
Status: CANCELLED | OUTPATIENT
Start: 2023-04-07

## 2023-04-07 RX ORDER — SODIUM CHLORIDE 9 MG/ML
INJECTION, SOLUTION INTRAVENOUS PRN
Status: CANCELLED | OUTPATIENT
Start: 2023-04-07

## 2023-04-21 RX ORDER — ALBUTEROL SULFATE 90 UG/1
AEROSOL, METERED RESPIRATORY (INHALATION)
Status: ON HOLD | COMMUNITY
Start: 2023-03-06 | End: 2023-04-26

## 2023-04-21 RX ORDER — ESZOPICLONE 3 MG/1
3 TABLET, FILM COATED ORAL NIGHTLY
Status: ON HOLD | COMMUNITY
End: 2023-04-26

## 2023-04-25 ENCOUNTER — ANESTHESIA EVENT (OUTPATIENT)
Dept: OPERATING ROOM | Age: 43
End: 2023-04-25
Payer: COMMERCIAL

## 2023-04-26 ENCOUNTER — ANESTHESIA (OUTPATIENT)
Dept: OPERATING ROOM | Age: 43
End: 2023-04-26
Payer: COMMERCIAL

## 2023-04-26 ENCOUNTER — HOSPITAL ENCOUNTER (OUTPATIENT)
Age: 43
Setting detail: OUTPATIENT SURGERY
Discharge: HOME | End: 2023-04-26
Attending: ORTHOPAEDIC SURGERY | Admitting: ORTHOPAEDIC SURGERY
Payer: COMMERCIAL

## 2023-04-26 VITALS
TEMPERATURE: 97.2 F | WEIGHT: 160 LBS | DIASTOLIC BLOOD PRESSURE: 68 MMHG | BODY MASS INDEX: 27.31 KG/M2 | SYSTOLIC BLOOD PRESSURE: 127 MMHG | HEIGHT: 64 IN | OXYGEN SATURATION: 94 % | RESPIRATION RATE: 16 BRPM | HEART RATE: 79 BPM

## 2023-04-26 DIAGNOSIS — G89.18 ACUTE POST-OPERATIVE PAIN: Primary | ICD-10-CM

## 2023-04-26 LAB
ANION GAP SERPL CALCULATED.3IONS-SCNC: 8 MMOL/L (ref 7–16)
BUN SERPL-MCNC: 17 MG/DL (ref 6–20)
CALCIUM SERPL-MCNC: 9.5 MG/DL (ref 8.6–10.2)
CHLORIDE SERPL-SCNC: 101 MMOL/L (ref 98–107)
CO2 SERPL-SCNC: 27 MMOL/L (ref 22–29)
CREAT SERPL-MCNC: 0.9 MG/DL (ref 0.5–1)
ERYTHROCYTE [DISTWIDTH] IN BLOOD BY AUTOMATED COUNT: 12.7 FL (ref 11.5–15)
GLUCOSE SERPL-MCNC: 142 MG/DL (ref 74–99)
HCG, URINE, POC: NEGATIVE
HCT VFR BLD AUTO: 39.2 % (ref 34–48)
HGB BLD-MCNC: 13.4 G/DL (ref 11.5–15.5)
Lab: NORMAL
MCH RBC QN AUTO: 32.2 PG (ref 26–35)
MCHC RBC AUTO-ENTMCNC: 34.2 % (ref 32–34.5)
MCV RBC AUTO: 94.2 FL (ref 80–99.9)
METER GLUCOSE: 121 MG/DL (ref 74–99)
METER GLUCOSE: 59 MG/DL (ref 74–99)
METER GLUCOSE: 60 MG/DL (ref 74–99)
METER GLUCOSE: 60 MG/DL (ref 74–99)
NEGATIVE QC PASS/FAIL: NORMAL
PLATELET # BLD AUTO: 332 E9/L (ref 130–450)
PMV BLD AUTO: 9.5 FL (ref 7–12)
POSITIVE QC PASS/FAIL: NORMAL
POTASSIUM SERPL-SCNC: 4.8 MMOL/L (ref 3.5–5)
RBC # BLD AUTO: 4.16 E12/L (ref 3.5–5.5)
SODIUM SERPL-SCNC: 136 MMOL/L (ref 132–146)
WBC # BLD: 6.5 E9/L (ref 4.5–11.5)

## 2023-04-26 PROCEDURE — 3600000012 HC SURGERY LEVEL 2 ADDTL 15MIN: Performed by: ORTHOPAEDIC SURGERY

## 2023-04-26 PROCEDURE — 7100000001 HC PACU RECOVERY - ADDTL 15 MIN: Performed by: ORTHOPAEDIC SURGERY

## 2023-04-26 PROCEDURE — 2580000003 HC RX 258: Performed by: ANESTHESIOLOGY

## 2023-04-26 PROCEDURE — 3700000001 HC ADD 15 MINUTES (ANESTHESIA): Performed by: ORTHOPAEDIC SURGERY

## 2023-04-26 PROCEDURE — 2500000003 HC RX 250 WO HCPCS: Performed by: ORTHOPAEDIC SURGERY

## 2023-04-26 PROCEDURE — 3600000002 HC SURGERY LEVEL 2 BASE: Performed by: ORTHOPAEDIC SURGERY

## 2023-04-26 PROCEDURE — 64708 REVISE ARM/LEG NERVE: CPT | Performed by: ORTHOPAEDIC SURGERY

## 2023-04-26 PROCEDURE — 64718 REVISE ULNAR NERVE AT ELBOW: CPT | Performed by: ORTHOPAEDIC SURGERY

## 2023-04-26 PROCEDURE — 82962 GLUCOSE BLOOD TEST: CPT

## 2023-04-26 PROCEDURE — 2580000003 HC RX 258: Performed by: ORTHOPAEDIC SURGERY

## 2023-04-26 PROCEDURE — 7100000000 HC PACU RECOVERY - FIRST 15 MIN: Performed by: ORTHOPAEDIC SURGERY

## 2023-04-26 PROCEDURE — 2580000003 HC RX 258: Performed by: NURSE ANESTHETIST, CERTIFIED REGISTERED

## 2023-04-26 PROCEDURE — 7100000011 HC PHASE II RECOVERY - ADDTL 15 MIN: Performed by: ORTHOPAEDIC SURGERY

## 2023-04-26 PROCEDURE — 2709999900 HC NON-CHARGEABLE SUPPLY: Performed by: ORTHOPAEDIC SURGERY

## 2023-04-26 PROCEDURE — 6360000002 HC RX W HCPCS: Performed by: NURSE ANESTHETIST, CERTIFIED REGISTERED

## 2023-04-26 PROCEDURE — 2500000003 HC RX 250 WO HCPCS: Performed by: NURSE ANESTHETIST, CERTIFIED REGISTERED

## 2023-04-26 PROCEDURE — 6360000002 HC RX W HCPCS: Performed by: ANESTHESIOLOGY

## 2023-04-26 PROCEDURE — 36415 COLL VENOUS BLD VENIPUNCTURE: CPT

## 2023-04-26 PROCEDURE — 80048 BASIC METABOLIC PNL TOTAL CA: CPT

## 2023-04-26 PROCEDURE — 6360000002 HC RX W HCPCS: Performed by: ORTHOPAEDIC SURGERY

## 2023-04-26 PROCEDURE — 7100000010 HC PHASE II RECOVERY - FIRST 15 MIN: Performed by: ORTHOPAEDIC SURGERY

## 2023-04-26 PROCEDURE — 85027 COMPLETE CBC AUTOMATED: CPT

## 2023-04-26 PROCEDURE — 3700000000 HC ANESTHESIA ATTENDED CARE: Performed by: ORTHOPAEDIC SURGERY

## 2023-04-26 RX ORDER — PROPOFOL 10 MG/ML
INJECTION, EMULSION INTRAVENOUS PRN
Status: DISCONTINUED | OUTPATIENT
Start: 2023-04-26 | End: 2023-04-26 | Stop reason: SDUPTHER

## 2023-04-26 RX ORDER — SODIUM CHLORIDE 9 MG/ML
INJECTION, SOLUTION INTRAVENOUS PRN
Status: DISCONTINUED | OUTPATIENT
Start: 2023-04-26 | End: 2023-04-26 | Stop reason: HOSPADM

## 2023-04-26 RX ORDER — SODIUM CHLORIDE 0.9 % (FLUSH) 0.9 %
5-40 SYRINGE (ML) INJECTION PRN
Status: DISCONTINUED | OUTPATIENT
Start: 2023-04-26 | End: 2023-04-26 | Stop reason: HOSPADM

## 2023-04-26 RX ORDER — DEXAMETHASONE SODIUM PHOSPHATE 4 MG/ML
INJECTION, SOLUTION INTRA-ARTICULAR; INTRALESIONAL; INTRAMUSCULAR; INTRAVENOUS; SOFT TISSUE PRN
Status: DISCONTINUED | OUTPATIENT
Start: 2023-04-26 | End: 2023-04-26 | Stop reason: SDUPTHER

## 2023-04-26 RX ORDER — FENTANYL CITRATE 50 UG/ML
INJECTION, SOLUTION INTRAMUSCULAR; INTRAVENOUS PRN
Status: DISCONTINUED | OUTPATIENT
Start: 2023-04-26 | End: 2023-04-26 | Stop reason: SDUPTHER

## 2023-04-26 RX ORDER — OXYCODONE HYDROCHLORIDE AND ACETAMINOPHEN 5; 325 MG/1; MG/1
1 TABLET ORAL ONCE
Status: DISCONTINUED | OUTPATIENT
Start: 2023-04-26 | End: 2023-04-26 | Stop reason: HOSPADM

## 2023-04-26 RX ORDER — ONDANSETRON 2 MG/ML
INJECTION INTRAMUSCULAR; INTRAVENOUS PRN
Status: DISCONTINUED | OUTPATIENT
Start: 2023-04-26 | End: 2023-04-26 | Stop reason: SDUPTHER

## 2023-04-26 RX ORDER — SODIUM CHLORIDE 9 MG/ML
INJECTION, SOLUTION INTRAVENOUS CONTINUOUS PRN
Status: DISCONTINUED | OUTPATIENT
Start: 2023-04-26 | End: 2023-04-26 | Stop reason: SDUPTHER

## 2023-04-26 RX ORDER — OXYCODONE HYDROCHLORIDE AND ACETAMINOPHEN 5; 325 MG/1; MG/1
1 TABLET ORAL EVERY 6 HOURS PRN
Qty: 28 TABLET | Refills: 0 | Status: SHIPPED | OUTPATIENT
Start: 2023-04-26 | End: 2023-05-03

## 2023-04-26 RX ORDER — DEXTROSE MONOHYDRATE 100 MG/ML
INJECTION, SOLUTION INTRAVENOUS CONTINUOUS PRN
Status: DISCONTINUED | OUTPATIENT
Start: 2023-04-26 | End: 2023-04-26 | Stop reason: HOSPADM

## 2023-04-26 RX ORDER — SODIUM CHLORIDE 0.9 % (FLUSH) 0.9 %
5-40 SYRINGE (ML) INJECTION EVERY 12 HOURS SCHEDULED
Status: DISCONTINUED | OUTPATIENT
Start: 2023-04-26 | End: 2023-04-26 | Stop reason: HOSPADM

## 2023-04-26 RX ORDER — PROCHLORPERAZINE EDISYLATE 5 MG/ML
5 INJECTION INTRAMUSCULAR; INTRAVENOUS
Status: DISCONTINUED | OUTPATIENT
Start: 2023-04-26 | End: 2023-04-26 | Stop reason: HOSPADM

## 2023-04-26 RX ORDER — LIDOCAINE HYDROCHLORIDE 20 MG/ML
INJECTION, SOLUTION EPIDURAL; INFILTRATION; INTRACAUDAL; PERINEURAL PRN
Status: DISCONTINUED | OUTPATIENT
Start: 2023-04-26 | End: 2023-04-26 | Stop reason: SDUPTHER

## 2023-04-26 RX ORDER — BUPIVACAINE HYDROCHLORIDE AND EPINEPHRINE 5; 5 MG/ML; UG/ML
INJECTION, SOLUTION EPIDURAL; INTRACAUDAL; PERINEURAL PRN
Status: DISCONTINUED | OUTPATIENT
Start: 2023-04-26 | End: 2023-04-26 | Stop reason: ALTCHOICE

## 2023-04-26 RX ORDER — KETOROLAC TROMETHAMINE 30 MG/ML
INJECTION, SOLUTION INTRAMUSCULAR; INTRAVENOUS PRN
Status: DISCONTINUED | OUTPATIENT
Start: 2023-04-26 | End: 2023-04-26 | Stop reason: SDUPTHER

## 2023-04-26 RX ORDER — SODIUM CHLORIDE 9 MG/ML
INJECTION, SOLUTION INTRAVENOUS CONTINUOUS
Status: DISCONTINUED | OUTPATIENT
Start: 2023-04-26 | End: 2023-04-26 | Stop reason: HOSPADM

## 2023-04-26 RX ORDER — MIDAZOLAM HYDROCHLORIDE 1 MG/ML
INJECTION INTRAMUSCULAR; INTRAVENOUS PRN
Status: DISCONTINUED | OUTPATIENT
Start: 2023-04-26 | End: 2023-04-26 | Stop reason: SDUPTHER

## 2023-04-26 RX ADMIN — PROPOFOL 200 MG: 10 INJECTION, EMULSION INTRAVENOUS at 10:43

## 2023-04-26 RX ADMIN — SODIUM CHLORIDE: 9 INJECTION, SOLUTION INTRAVENOUS at 11:37

## 2023-04-26 RX ADMIN — DEXTROSE MONOHYDRATE 125 ML: 100 INJECTION, SOLUTION INTRAVENOUS at 13:36

## 2023-04-26 RX ADMIN — MIDAZOLAM 2 MG: 1 INJECTION INTRAMUSCULAR; INTRAVENOUS at 10:34

## 2023-04-26 RX ADMIN — ONDANSETRON 4 MG: 2 INJECTION INTRAMUSCULAR; INTRAVENOUS at 10:51

## 2023-04-26 RX ADMIN — FENTANYL CITRATE 25 MCG: 50 INJECTION, SOLUTION INTRAMUSCULAR; INTRAVENOUS at 11:52

## 2023-04-26 RX ADMIN — HYDROMORPHONE HYDROCHLORIDE 0.5 MG: 1 INJECTION, SOLUTION INTRAMUSCULAR; INTRAVENOUS; SUBCUTANEOUS at 12:06

## 2023-04-26 RX ADMIN — WATER 2000 MG: 1 INJECTION INTRAMUSCULAR; INTRAVENOUS; SUBCUTANEOUS at 10:41

## 2023-04-26 RX ADMIN — FENTANYL CITRATE 50 MCG: 50 INJECTION, SOLUTION INTRAMUSCULAR; INTRAVENOUS at 10:49

## 2023-04-26 RX ADMIN — KETOROLAC TROMETHAMINE 30 MG: 30 INJECTION, SOLUTION INTRAMUSCULAR at 11:32

## 2023-04-26 RX ADMIN — FENTANYL CITRATE 50 MCG: 50 INJECTION, SOLUTION INTRAMUSCULAR; INTRAVENOUS at 10:42

## 2023-04-26 RX ADMIN — HYDROMORPHONE HYDROCHLORIDE 0.5 MG: 1 INJECTION, SOLUTION INTRAMUSCULAR; INTRAVENOUS; SUBCUTANEOUS at 12:11

## 2023-04-26 RX ADMIN — SODIUM CHLORIDE: 9 INJECTION, SOLUTION INTRAVENOUS at 09:28

## 2023-04-26 RX ADMIN — LIDOCAINE HYDROCHLORIDE 100 MG: 20 INJECTION, SOLUTION EPIDURAL; INFILTRATION; INTRACAUDAL; PERINEURAL at 10:42

## 2023-04-26 RX ADMIN — FENTANYL CITRATE 50 MCG: 50 INJECTION, SOLUTION INTRAMUSCULAR; INTRAVENOUS at 10:58

## 2023-04-26 RX ADMIN — FENTANYL CITRATE 25 MCG: 50 INJECTION, SOLUTION INTRAMUSCULAR; INTRAVENOUS at 11:23

## 2023-04-26 RX ADMIN — DEXAMETHASONE SODIUM PHOSPHATE 8 MG: 4 INJECTION, SOLUTION INTRAMUSCULAR; INTRAVENOUS at 10:51

## 2023-04-26 ASSESSMENT — PAIN DESCRIPTION - LOCATION
LOCATION: ARM

## 2023-04-26 ASSESSMENT — PAIN DESCRIPTION - ORIENTATION
ORIENTATION: RIGHT

## 2023-04-26 ASSESSMENT — PAIN DESCRIPTION - FREQUENCY
FREQUENCY: CONTINUOUS

## 2023-04-26 ASSESSMENT — PAIN SCALES - GENERAL
PAINLEVEL_OUTOF10: 0
PAINLEVEL_OUTOF10: 7
PAINLEVEL_OUTOF10: 2
PAINLEVEL_OUTOF10: 4
PAINLEVEL_OUTOF10: 2
PAINLEVEL_OUTOF10: 7
PAINLEVEL_OUTOF10: 2
PAINLEVEL_OUTOF10: 7

## 2023-04-26 ASSESSMENT — PAIN DESCRIPTION - DESCRIPTORS
DESCRIPTORS: DISCOMFORT

## 2023-04-26 ASSESSMENT — PAIN DESCRIPTION - PAIN TYPE
TYPE: SURGICAL PAIN

## 2023-04-26 NOTE — H&P
Update     Patient was seen and examined. Patient's history and physical was reviewed with the patient. There has been no significant interval changes.       Electronically signed by [facility], DO on 4/26/2023 at 9:33 AM

## 2023-04-26 NOTE — ANESTHESIA PRE PROCEDURE
Department of Anesthesiology  Preprocedure Note       Name:  Michael Rojo   Age:  36 y.o.  :  1980                                          MRN:  [id number]         Date:  2023      Surgeon: James Mccall):  Chito Easton MD    Procedure: Procedure(s):  RIGHT ULNAR NERVE DECOMPRESSION AT THE ELBOW WITH POSSIBLE TRANSPOSITION RIGHT RADIAL NERVE DECOMPRESSION AT THE ELBOW    Medications prior to admission:   Prior to Admission medications    Medication Sig Start Date End Date Taking? Authorizing Provider   eszopiclone 3 MG TABS Take 1 tablet by mouth nightly. Max Daily Amount: 3 mg   Yes Historical Provider, MD   albuterol sulfate HFA (PROVENTIL;VENTOLIN;PROAIR) 108 (90 Base) MCG/ACT inhaler  3/6/23   Historical Provider, MD   atorvastatin (LIPITOR) 40 MG tablet Take 1 tablet by mouth nightly 23   JOSEPH Horner   Insulin Disposable Pump (OMNIPOD 5 G6 POD, GEN 5,) MISC CHANGE POD EVERY 72 HOURS 3/14/23   JOSEPH Gallardo NP   folic acid (FOLVITE) 1 MG tablet Take 1 tablet by mouth daily 22   Historical Provider, MD   methotrexate (RHEUMATREX) 2.5 MG chemo tablet Take 2.5 mg by mouth once a week  Patient not taking: Reported on 2023   Historical Provider, MD   levothyroxine (SYNTHROID) 100 MCG tablet Take 1 tablet by mouth Daily 23   JOSEPH Horner   ADDERALL XR 20 MG capsule Take 20 mg by mouth daily.  22   Historical Provider, MD   blood glucose test strips (ONETOUCH ULTRA) strip USE TO TEST FOUR TIMES DAILY 22   Addie Brito MD   Insulin Disposable Pump (OMNIPOD 5 G6 INTRO, GEN 5,) KIT Use to infuse insulin 22   JOSEPH Gallardo NP   insulin aspart (NOVOLOG) 100 UNIT/ML injection vial basal rate 12a 1.0, 8a 1.55, 10p 1.0, CR 12, ISF 45, goal 100-150, active insulin time 3 hrs. 8/15/22   JOSEPH Gilliam CNP   meloxicam (MOBIC) 7.5 MG tablet Take 7.5 mg by mouth in the morning and 7.5 mg before

## 2023-04-26 NOTE — BRIEF OP NOTE
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

## 2023-04-26 NOTE — ANESTHESIA POSTPROCEDURE EVALUATION
Department of Anesthesiology  Postprocedure Note    Patient: Mariam Dandy  MRN: [id number]  YOB: 1980  Date of evaluation: 4/26/2023      Procedure Summary     Date: 04/26/23 Room / Location: [facility] OR 04 / [facility]    Anesthesia Start: 1034 Anesthesia Stop: 1159    Procedure: RIGHT ULNAR NERVE DECOMPRESSION AT THE ELBOW WITH POSSIBLE TRANSPOSITION RIGHT RADIAL NERVE DECOMPRESSION AT THE ELBOW (Right) Diagnosis:       Entrapment of ulnar nerve, right      Radial tunnel syndrome, right      (Entrapment of ulnar nerve, right [G56.21])      (Radial tunnel syndrome, right [G56.31])    Surgeons: Claire Ambrocio MD Responsible Provider: Janna Mason MD    Anesthesia Type: General ASA Status: 3          Anesthesia Type: General    Lidia Phase I: Lidia Score: 10    Lidia Phase II:        Anesthesia Post Evaluation    Patient location during evaluation: PACU  Patient participation: complete - patient participated  Level of consciousness: awake and alert  Pain score: 2  Airway patency: patent  Nausea & Vomiting: no nausea and no vomiting  Complications: no  Cardiovascular status: hemodynamically stable  Respiratory status: acceptable, spontaneous ventilation and nonlabored ventilation  Hydration status: stable  Multimodal analgesia pain management approach

## 2023-04-26 NOTE — PROGRESS NOTES
Patient to Stage 2, BS 59. Snacks provided. Patient awake and alert,  at bedside.  instructed to turn pump off. Patient sugar BS trending on her CGM 73-70-60. Recheck with our machine, 60. Dr. Khushboo Zaragoza notified. Hypoglycemia protocol ordered.

## 2023-04-27 NOTE — OP NOTE
[address] [address]                                OPERATIVE REPORT    PATIENT NAME: Malik Zacarias                   :        1980  MED REC NO:   [id number]                            ROOM:  ACCOUNT NO:   [de-identified]                           ADMIT DATE: 2023  PROVIDER:     Jeni Barnhart MD    DATE OF PROCEDURE:  2023    PREOPERATIVE DIAGNOSES:  1. Ulnar neuropathy at elbow. 2.  Right radial tunnel syndrome. POSTOPERATIVE DIAGNOSES:  1. Ulnar neuropathy at elbow. 2.  Right radial tunnel syndrome. PROCEDURE PERFORMED:  1. Right ulnar nerve in situ decompression at elbow. 2.  Right radial nerve neurolysis at elbow and through the radial  tunnel. ANESTHESIA:  1. General.  2.  Local anesthetic by surgeon, 20 mL of 0.25% Marcaine with  epinephrine. SURGEON:  Jeni Barnhart MD.    ASSISTANT:  Dr. Leo Franco, orthopedic surgery resident. TOTAL TOURNIQUET TIME:  40 minutes at 250 mmHg of brachial tourniquet. ESTIMATED BLOOD LOSS:  Minimal.    FINDINGS:  1. Significant ulnar nerve hyperemia throughout its _____ ulnar nerves  course of the cubital tunnel. 2.  Status post decompression, the nerve was fully decompressed  including above the medial epicondyle through the cubital tunnel proper  and into the flexor carpi ulnaris fascia. Status post decompression, no  significant instability was appreciated. 3.  There were significant findings of radial nerve entrapment over the  anterior elbow. There was hypertrophic changes to the radial nerve over  both the superficial radial nerve, sensory branch as well as its  position into the posterior interosseous nerve as well as through the  radial tunnel proper. Status post decompression, the nerve was intact  throughout in its entire visualized course and it was fully  decompressed. COMPLICATIONS:  None.     SPECIMENS:

## 2023-05-01 ENCOUNTER — OFFICE VISIT (OUTPATIENT)
Dept: ORTHOPEDIC SURGERY | Age: 43
End: 2023-05-01

## 2023-05-01 VITALS — HEIGHT: 64 IN | BODY MASS INDEX: 27.31 KG/M2 | WEIGHT: 160 LBS

## 2023-05-01 DIAGNOSIS — G56.31 RADIAL TUNNEL SYNDROME OF RIGHT UPPER EXTREMITY: Primary | ICD-10-CM

## 2023-05-01 DIAGNOSIS — G56.21 ULNAR NEUROPATHY AT ELBOW, RIGHT: ICD-10-CM

## 2023-05-01 PROCEDURE — 99024 POSTOP FOLLOW-UP VISIT: CPT | Performed by: ORTHOPAEDIC SURGERY

## 2023-05-01 NOTE — PROGRESS NOTES
HPI:   Patient follows up today s/p right ulnar nerve in situ decompression at the elbow and right radial nerve neurolysis at the elbow and radial tunnel. No complaints. Pain controlled, has been weaning herself off her pain medication and states she has stopped taking it regularly. Denies any fevers or chills, denies any drainage from her incision sites. Physical Exam:  right upper extremity:   Splint taken down for exam.  Incisions clean, dry, and intact  Mild amount of ecchymosis over the forearm  No signs of infection  ROM appropriate  NVI  Radial, median, and ulnar sensation intact to light touch  Brisk capillary refill  Muscle strength 5/5 grossly AIN/PIN/ulnar nerves      Assessment:  1 week postop from right radial and ulnar nerve release at the elbow    Plan:  Cock up splint to RUE  We will need nursing follow-up in 1 week to remove radial suture, follow up 4  weeks with PA  Discussed cocoa butter or vitamin E lotion to scars to help fade the appearance. Okay to shower, do not scrub incisions. Start gentle range of motion exercises of the elbow and hand. 5/1/2023  Alejandra Kauffman,      I have seen and evaluated the patient and agree with the above assessment and plan on today's visit. I have performed the key components of the history and physical examination with significant findings of 1 week doing very well status post radial nerve decompression at elbow and in situ ulnar nerve decompression at the elbow. She is neurovascular intact. She is fitted and transition to a cock-up wrist brace. She may wean herself out of the wrist brace over the next 2 to 3 weeks. . I concur with the findings and plan as documented.     Cathie Christie MD  5/1/2023

## 2023-05-02 RX ORDER — PROCHLORPERAZINE 25 MG/1
SUPPOSITORY RECTAL
Qty: 1 EACH | Refills: 0 | Status: SHIPPED | OUTPATIENT
Start: 2023-05-02

## 2023-05-02 RX ORDER — PROCHLORPERAZINE 25 MG/1
SUPPOSITORY RECTAL
Qty: 9 EACH | Refills: 3 | Status: SHIPPED | OUTPATIENT
Start: 2023-05-02

## 2023-05-02 RX ORDER — PROCHLORPERAZINE 25 MG/1
SUPPOSITORY RECTAL
Qty: 1 EACH | Refills: 3 | Status: SHIPPED | OUTPATIENT
Start: 2023-05-02

## 2023-09-01 DIAGNOSIS — E10.9 TYPE 1 DIABETES MELLITUS WITHOUT COMPLICATION (HCC): Primary | ICD-10-CM

## 2023-09-05 RX ORDER — INSULIN ASPART 100 [IU]/ML
INJECTION, SOLUTION INTRAVENOUS; SUBCUTANEOUS
Qty: 90 ML | Refills: 3 | Status: SHIPPED | OUTPATIENT
Start: 2023-09-05

## 2023-09-19 DIAGNOSIS — E03.9 HYPOTHYROIDISM, UNSPECIFIED TYPE: ICD-10-CM

## 2023-09-21 RX ORDER — LEVOTHYROXINE SODIUM 0.1 MG/1
100 TABLET ORAL DAILY
Qty: 90 TABLET | Refills: 1 | OUTPATIENT
Start: 2023-09-21

## 2023-09-21 RX ORDER — LEVOTHYROXINE SODIUM 0.1 MG/1
100 TABLET ORAL DAILY
Qty: 90 TABLET | Refills: 1 | Status: SHIPPED | OUTPATIENT
Start: 2023-09-21

## 2023-09-27 ENCOUNTER — OFFICE VISIT (OUTPATIENT)
Dept: ENDOCRINOLOGY | Age: 43
End: 2023-09-27

## 2023-09-27 VITALS
SYSTOLIC BLOOD PRESSURE: 146 MMHG | WEIGHT: 152 LBS | BODY MASS INDEX: 25.95 KG/M2 | HEART RATE: 109 BPM | DIASTOLIC BLOOD PRESSURE: 87 MMHG | HEIGHT: 64 IN

## 2023-09-27 DIAGNOSIS — E03.9 HYPOTHYROIDISM, UNSPECIFIED TYPE: ICD-10-CM

## 2023-09-27 DIAGNOSIS — E10.9 TYPE 1 DIABETES MELLITUS WITHOUT COMPLICATION (HCC): Primary | ICD-10-CM

## 2023-09-27 DIAGNOSIS — E55.9 VITAMIN D DEFICIENCY: ICD-10-CM

## 2023-09-27 DIAGNOSIS — Z96.41 INSULIN PUMP IN PLACE: ICD-10-CM

## 2023-09-27 DIAGNOSIS — E16.2 HYPOGLYCEMIA: ICD-10-CM

## 2023-09-27 LAB — HBA1C MFR BLD: 7.6 %

## 2023-09-27 NOTE — PROGRESS NOTES
[facility] Department of Endocrinology Diabetes and Metabolism   [address] [facility] [address] [place] [address], [address]   Phone: [phone]  Fax: [phone]    Date of Service: 9/27/2023  Primary Care Physician: Elmira Daly DO  Provider: JOSEPH Sutton      Reason for the visit:  DM type 1 on Omnipod insulin pump, hypothyroidism     History of Present Illness: The history is provided by the patient. No  was used. Accuracy of the patient data is excellent. Ozzy Latif is a very pleasant 39 y.o. female seen today for follow up visit     Ozzy Latif was diagnosed with diabetes at age 2  Omnipod 5 pump and DEXCOM cgm. She is not in automatic mode . Current pump settings: basal rate 12a 1, 7a 1.2, 9p 1.0, CR 12, 4 pm 10, ISF 12a 40, BG target 110 active insulin time 3 hrs    The patient has been checking blood sugar multiple times a day using DEXCOM   Dexcom reviewed  Average   Time in range 33%  Hyperglycemic 66%  1% hypoglycemia    Lab Results   Component Value Date/Time    LABA1C 6.7 02/06/2023 02:28 PM    LABA1C 7.0 10/13/2022 04:55 AM    LABA1C 6.6 08/14/2022 11:32 AM     Patient reported infrequent hypoglycemic episodes  The patient has been mindful of what has been eating and following diabetic diet as encouraged  I reviewed current medications and the patient has no issues with diabetes medications  Ozzy Latif is up to date with eye exam and denied any history of diabetic retinopathy   The patient seeing performs her own feet care  Microvascular complications:  No Retinopathy, no Nephropathy + Neuropathy   Macrovascular complications: no CAD, PVD, or Stroke  The patient receives Flushot every year        Hypothyroidism    On levothyroxine 100 mcg daily. Patient takes levothyroxine in the morning at empty stomach, wait one hour before eating , avoid multivitamins containing calcium  or iron with it.       Lab Results

## 2023-12-05 LAB
CHOLESTEROL, TOTAL: 152 MG/DL
CHOLESTEROL/HDL RATIO: NORMAL
HDLC SERPL-MCNC: 67 MG/DL (ref 35–70)
LDL CHOLESTEROL CALCULATED: 65 MG/DL (ref 0–160)
NONHDLC SERPL-MCNC: NORMAL MG/DL
T4 FREE: 1.77
TRIGL SERPL-MCNC: 101 MG/DL
TSH SERPL DL<=0.05 MIU/L-ACNC: 2.48 UIU/ML
VLDLC SERPL CALC-MCNC: NORMAL MG/DL

## 2023-12-11 DIAGNOSIS — E03.9 HYPOTHYROIDISM, UNSPECIFIED TYPE: ICD-10-CM

## 2023-12-11 DIAGNOSIS — E55.9 VITAMIN D DEFICIENCY: ICD-10-CM

## 2023-12-11 DIAGNOSIS — E10.9 TYPE 1 DIABETES MELLITUS WITHOUT COMPLICATION (HCC): ICD-10-CM

## 2024-01-29 ENCOUNTER — HOSPITAL ENCOUNTER (OUTPATIENT)
Dept: GENERAL RADIOLOGY | Age: 44
Discharge: HOME | End: 2024-01-31
Payer: COMMERCIAL

## 2024-01-29 VITALS — BODY MASS INDEX: 27.31 KG/M2 | HEIGHT: 64 IN | WEIGHT: 160 LBS

## 2024-01-29 DIAGNOSIS — Z12.31 ENCOUNTER FOR SCREENING MAMMOGRAM FOR MALIGNANT NEOPLASM OF BREAST: ICD-10-CM

## 2024-01-29 PROCEDURE — 77063 BREAST TOMOSYNTHESIS BI: CPT

## 2024-02-16 RX ORDER — LEVOTHYROXINE SODIUM 0.1 MG/1
100 TABLET ORAL DAILY
Qty: 90 TABLET | Refills: 1 | Status: SHIPPED | OUTPATIENT
Start: 2024-02-16

## 2024-02-29 DIAGNOSIS — E10.9 TYPE 1 DIABETES MELLITUS WITHOUT COMPLICATION (HCC): ICD-10-CM

## 2024-02-29 DIAGNOSIS — E10.9 TYPE 1 DIABETES MELLITUS WITHOUT COMPLICATION (HCC): Primary | ICD-10-CM

## 2024-02-29 RX ORDER — INSULIN PUMP CONTROLLER
EACH MISCELLANEOUS
Qty: 30 EACH | Refills: 3 | Status: CANCELLED | OUTPATIENT
Start: 2024-02-29

## 2024-03-04 RX ORDER — INSULIN PMP CART,AUT,G6/7,CNTR
EACH SUBCUTANEOUS
Qty: 30 EACH | Refills: 3 | Status: SHIPPED | OUTPATIENT
Start: 2024-03-04

## 2024-04-03 RX ORDER — PROCHLORPERAZINE 25 MG/1
SUPPOSITORY RECTAL
Qty: 9 EACH | Refills: 3 | Status: SHIPPED | OUTPATIENT
Start: 2024-04-03

## 2024-04-23 ENCOUNTER — TELEPHONE (OUTPATIENT)
Dept: ENDOCRINOLOGY | Age: 44
End: 2024-04-23

## 2024-04-23 NOTE — TELEPHONE ENCOUNTER
Pt had to reschedule her 5-6-24 appt because pt is having surgery that day. I scheduled first available appt, but pt did not want to wait until 10-2-24 to see provider. Please call pt back. Thanks!

## 2024-06-10 DIAGNOSIS — M25.522 BILATERAL ELBOW JOINT PAIN: Primary | ICD-10-CM

## 2024-06-10 DIAGNOSIS — M25.521 BILATERAL ELBOW JOINT PAIN: Primary | ICD-10-CM

## 2024-06-24 ENCOUNTER — OFFICE VISIT (OUTPATIENT)
Dept: ORTHOPEDIC SURGERY | Age: 44
End: 2024-06-24
Payer: COMMERCIAL

## 2024-06-24 VITALS — WEIGHT: 140 LBS | BODY MASS INDEX: 23.9 KG/M2 | HEIGHT: 64 IN

## 2024-06-24 DIAGNOSIS — G56.02 CARPAL TUNNEL SYNDROME ON LEFT: ICD-10-CM

## 2024-06-24 DIAGNOSIS — G56.22 CUBITAL TUNNEL SYNDROME ON LEFT: Primary | ICD-10-CM

## 2024-06-24 PROCEDURE — G8420 CALC BMI NORM PARAMETERS: HCPCS | Performed by: ORTHOPAEDIC SURGERY

## 2024-06-24 PROCEDURE — 99203 OFFICE O/P NEW LOW 30 MIN: CPT | Performed by: ORTHOPAEDIC SURGERY

## 2024-06-24 PROCEDURE — G8427 DOCREV CUR MEDS BY ELIG CLIN: HCPCS | Performed by: ORTHOPAEDIC SURGERY

## 2024-06-24 PROCEDURE — 1036F TOBACCO NON-USER: CPT | Performed by: ORTHOPAEDIC SURGERY

## 2024-06-24 NOTE — PROGRESS NOTES
Chief Complaint   Patient presents with    Elbow Pain     Bilateral Elbow x years, L>R, Previous Right Elbow surgery x 2, no previous Left Elbow Surgery.         Tanya Patel  female presents today for evaluation of her b/l elbow pain L>R.  The patient states the pain started  a few years ago.  She had cubital and radial tunnel decompression done on the right side in 2023. She also had left CTR done in 2011. She complains that the entire hands go numb and she has pain in the elbow. The pain is throbbing and tingling in nature.  It is worse with movement, lifting, sleeping and better with rest.  The patient does complain of night pain.  The patient is right hand dominant.  The patient is working at this time.  Her occupation is at [employer] .      Past Medical History:   Diagnosis Date    Anxiety     Depression     Diabetes mellitus type 1, controlled, without complications ([facility]) 01/08/2016    Epicondylitis, lateral 02/2017    right    GERD (gastroesophageal reflux disease)     Hypertension     IBS (irritable bowel syndrome)     Insulin pump status has insulin pump    Dexcom. follows with [facility] Chong [facility] CNS    SIRS (systemic inflammatory response syndrome) ([facility]) 01/08/2016    Thyroid disease     Trigger finger     right index and little finger     Past Surgical History:   Procedure Laterality Date    ARM SURGERY Right 4/26/2023    RIGHT ULNAR NERVE DECOMPRESSION AT THE ELBOW WITH POSSIBLE TRANSPOSITION RIGHT RADIAL NERVE DECOMPRESSION AT THE ELBOW performed by Sohan Coles MD at [facility] OR    CARPAL TUNNEL RELEASE  2011    left hand     DEBRIDEMENT Right 02/14/2017    Right lateral epicondylar debridement    ENDOSCOPY, COLON, DIAGNOSTIC      FINGER TRIGGER RELEASE      middle finger bilat, and right thumb  / multiple  / last one 2017    FINGER TRIGGER RELEASE Right 08/12/2016    4th finger    FINGER TRIGGER RELEASE Right 04/03/2019    TRIGGER RELEASE RIGHT INDEX AND SMALL

## 2024-07-08 RX ORDER — PROCHLORPERAZINE 25 MG/1
SUPPOSITORY RECTAL
Qty: 9 EACH | Refills: 0 | Status: SHIPPED | OUTPATIENT
Start: 2024-07-08

## 2024-07-08 RX ORDER — PROCHLORPERAZINE 25 MG/1
SUPPOSITORY RECTAL
Qty: 1 EACH | Refills: 0 | Status: SHIPPED | OUTPATIENT
Start: 2024-07-08

## 2024-08-30 ENCOUNTER — HOSPITAL ENCOUNTER (OUTPATIENT)
Age: 44
Discharge: HOME | End: 2024-09-01

## 2024-09-10 LAB — SURGICAL PATHOLOGY REPORT: NORMAL

## 2024-10-01 DIAGNOSIS — E10.9 TYPE 1 DIABETES MELLITUS WITHOUT COMPLICATION (HCC): ICD-10-CM

## 2024-10-01 RX ORDER — PROCHLORPERAZINE 25 MG/1
SUPPOSITORY RECTAL
Qty: 1 EACH | Refills: 0 | Status: SHIPPED | OUTPATIENT
Start: 2024-10-01

## 2024-10-01 RX ORDER — LEVOTHYROXINE SODIUM 100 UG/1
100 TABLET ORAL DAILY
Qty: 90 TABLET | Refills: 1 | Status: SHIPPED | OUTPATIENT
Start: 2024-10-01

## 2024-10-01 RX ORDER — INSULIN ASPART 100 [IU]/ML
INJECTION, SOLUTION INTRAVENOUS; SUBCUTANEOUS
Qty: 90 ML | Refills: 3 | Status: SHIPPED | OUTPATIENT
Start: 2024-10-01

## 2024-10-01 RX ORDER — PROCHLORPERAZINE 25 MG/1
SUPPOSITORY RECTAL
Qty: 9 EACH | Refills: 0 | Status: SHIPPED | OUTPATIENT
Start: 2024-10-01

## 2024-10-01 RX ORDER — INSULIN PMP CART,AUT,G6/7,CNTR
EACH SUBCUTANEOUS
Qty: 30 EACH | Refills: 3 | Status: SHIPPED | OUTPATIENT
Start: 2024-10-01

## 2024-10-07 ENCOUNTER — OFFICE VISIT (OUTPATIENT)
Dept: SURGERY | Age: 44
End: 2024-10-07
Payer: COMMERCIAL

## 2024-10-07 VITALS
SYSTOLIC BLOOD PRESSURE: 148 MMHG | TEMPERATURE: 97.9 F | WEIGHT: 160.4 LBS | HEIGHT: 64 IN | HEART RATE: 115 BPM | DIASTOLIC BLOOD PRESSURE: 97 MMHG | BODY MASS INDEX: 27.39 KG/M2 | RESPIRATION RATE: 18 BRPM | OXYGEN SATURATION: 98 %

## 2024-10-07 DIAGNOSIS — K80.50 BILIARY COLIC: Primary | ICD-10-CM

## 2024-10-07 PROCEDURE — G8427 DOCREV CUR MEDS BY ELIG CLIN: HCPCS | Performed by: SURGERY

## 2024-10-07 PROCEDURE — 3077F SYST BP >= 140 MM HG: CPT | Performed by: SURGERY

## 2024-10-07 PROCEDURE — G8484 FLU IMMUNIZE NO ADMIN: HCPCS | Performed by: SURGERY

## 2024-10-07 PROCEDURE — G8419 CALC BMI OUT NRM PARAM NOF/U: HCPCS | Performed by: SURGERY

## 2024-10-07 PROCEDURE — 3080F DIAST BP >= 90 MM HG: CPT | Performed by: SURGERY

## 2024-10-07 PROCEDURE — 99204 OFFICE O/P NEW MOD 45 MIN: CPT | Performed by: SURGERY

## 2024-10-07 RX ORDER — VONOPRAZAN FUMARATE 26.72 MG/1
TABLET ORAL
COMMUNITY
Start: 2024-10-03

## 2024-10-07 RX ORDER — DEXTROAMPHETAMINE SACCHARATE, AMPHETAMINE ASPARTATE, DEXTROAMPHETAMINE SULFATE AND AMPHETAMINE SULFATE 1.25; 1.25; 1.25; 1.25 MG/1; MG/1; MG/1; MG/1
5 TABLET ORAL
COMMUNITY
Start: 2024-09-16

## 2024-10-07 RX ORDER — HYDROXYZINE PAMOATE 25 MG/1
25 CAPSULE ORAL 3 TIMES DAILY PRN
COMMUNITY
Start: 2024-09-16

## 2024-10-07 RX ORDER — DEXTROAMPHETAMINE SACCHARATE, AMPHETAMINE ASPARTATE, DEXTROAMPHETAMINE SULFATE AND AMPHETAMINE SULFATE 5; 5; 5; 5 MG/1; MG/1; MG/1; MG/1
20 TABLET ORAL
COMMUNITY
Start: 2024-09-16

## 2024-10-07 RX ORDER — SODIUM CHLORIDE 9 MG/ML
INJECTION, SOLUTION INTRAVENOUS PRN
OUTPATIENT
Start: 2024-10-07

## 2024-10-07 RX ORDER — SODIUM CHLORIDE 0.9 % (FLUSH) 0.9 %
5-40 SYRINGE (ML) INJECTION EVERY 12 HOURS SCHEDULED
OUTPATIENT
Start: 2024-10-07

## 2024-10-07 RX ORDER — SODIUM CHLORIDE 0.9 % (FLUSH) 0.9 %
5-40 SYRINGE (ML) INJECTION PRN
OUTPATIENT
Start: 2024-10-07

## 2024-10-07 RX ORDER — CETIRIZINE HYDROCHLORIDE 10 MG/1
TABLET ORAL
COMMUNITY
Start: 2024-08-19

## 2024-10-07 RX ORDER — INDOCYANINE GREEN AND WATER 25 MG
2.5 KIT INJECTION
OUTPATIENT
Start: 2024-10-07 | End: 2024-10-07

## 2024-10-07 RX ORDER — MONTELUKAST SODIUM 10 MG/1
10 TABLET ORAL NIGHTLY
COMMUNITY
Start: 2024-07-31

## 2024-10-07 RX ORDER — ALBUTEROL SULFATE 90 UG/1
2 INHALANT RESPIRATORY (INHALATION) 4 TIMES DAILY
COMMUNITY
Start: 2024-07-02

## 2024-10-07 NOTE — PROGRESS NOTES
History and Physical - General Surgery    Patient's Name/Date of Birth: Tanya Patel / 1980    Date: 10/7/2024    PCP: Nadir Quiles Jr., DO    Referring Physician:   Nadir Quiles Jr*  [phone]      CHIEF COMPLAINT:    Chief Complaint   Patient presents with    New Patient     gallbladder consult. has had U/S, scope and HIDA Scan. patient will have results sent           HISTORY OF PRESENT ILLNESS:    Tanya Patel is an 44 y.o. female who presents with heartburn, epigastric pain, nausea vomiting. This is worse with fatty meals, though it has progressed to the point where everything gives her symptoms. She had an EGD that showed mild gastritis. She had pain during her HIDA. Her EF was 96%. She saw another surgeon who she said was reluctant to remove her gallbladder. She was sent to GI and she said they had nothing further to add. She has been on a PPI for years.     Past Medical History:   Past Medical History:   Diagnosis Date    Anxiety     Depression     Diabetes mellitus type 1, controlled, without complications ([facility]) 01/08/2016    Epicondylitis, lateral 02/2017    right    GERD (gastroesophageal reflux disease)     Hypertension     IBS (irritable bowel syndrome)     Insulin pump status has insulin pump    Dexcom. follows with [facility]    SIRS (systemic inflammatory response syndrome) ([facility]) 01/08/2016    Thyroid disease     Trigger finger     right index and little finger        Past Surgical History:   Past Surgical History:   Procedure Laterality Date    ARM SURGERY Right 4/26/2023    RIGHT ULNAR NERVE DECOMPRESSION AT THE ELBOW WITH POSSIBLE TRANSPOSITION RIGHT RADIAL NERVE DECOMPRESSION AT THE ELBOW performed by Sohan Coles MD at [facility] OR    CARPAL TUNNEL RELEASE  2011    left hand     DEBRIDEMENT Right 02/14/2017    Right lateral epicondylar debridement    ENDOSCOPY, COLON, DIAGNOSTIC      FINGER TRIGGER RELEASE      middle finger bilat, and

## 2024-10-07 NOTE — PATIENT INSTRUCTIONS
General Surgery     Preoperative Instructions    Please read the following information very carefully. It contains information that is necessary to best prepare you for your upcoming procedure.    Make arrangements for a  to take you to and from your procedure.  Nothing to eat or drink after midnight the night before your procedure.  Follow your bowel prep instructions if you have them for this procedure.    3 days prior to your procedure: Stop taking blood thinners like Coumadin or Plavix or Xarelto.  5 days prior to your procedure: Stop taking Aspirin or Aspirin containing products.   If you cannot stop any of these medications prior to your procedure, please contact our office.    Medications morning of procedure:  Only heart, breathing, blood pressure, and seizure medications are permitted on the morning of your procedure. These medications can be taken with a sip of water.    IF YOU ARE UNABLE TO KEEP THE ABOVE SCHEDULED PROCEDURE, YOU MUST NOTIFY DR. DOMINGO'S OFFICE [phone]. NOT THE FACILITY.    NO CHEWING GUM OR CHEWING TOBACCO AFTER MIDNIGHT ON DAY OF PROCEDURE.    YOU MUST HAVE TRANSPORTATION TO AND FROM THE FACILITY.

## 2024-10-08 ENCOUNTER — TELEPHONE (OUTPATIENT)
Dept: SURGERY | Age: 44
End: 2024-10-08

## 2024-10-08 DIAGNOSIS — F90.9 HYPERKINETIC: ICD-10-CM

## 2024-10-08 DIAGNOSIS — K80.50 BILIARY COLIC: ICD-10-CM

## 2024-10-08 NOTE — TELEPHONE ENCOUNTER
Prior Authorization Form:      DEMOGRAPHICS:                     Patient Name:  Tanya Jolley  Patient :  1980            Insurance:  Payor: BCBS / Plan: BCBS OUT OF STATE / Product Type: *No Product type* /   Insurance ID Number:    Payer/Plan Subscr  Sex Relation Sub. Ins. ID Effective Group Num   1. BCBS - BCBS O* CHELSI JOLLEY 10/20/1978 Male Spouse [id number]* 21 [id number]                                   [address]   2. [facility]* TANYA JOLLEY 1980 Female Self [id number] 22 [id number]                                   [address]         DIAGNOSIS & PROCEDURE:                       Procedure/Operation: LAPAROSCOPIC ROBOTIC CHOLECYSTECTOMY           CPT Code: 75785    Diagnosis:  BILIARY COLIC   HYPERKINETIC    ICD10 Code: K80.5     F90.9    Location:  [facility]    Surgeon:  CHAYO    SCHEDULING INFORMATION:                          Date: 10/22/2024    Time: 12:00 PM              Anesthesia:  GENERAL                                                       Status:  Outpatient        Special Comments:         Electronically signed by Tanya Levine MA on 10/8/2024 at 9:46 AM

## 2024-11-01 NOTE — PROGRESS NOTES
[facility] PRE-ADMISSION TESTING INSTRUCTIONS    The Preadmission Testing patient is instructed accordingly using the following criteria (check applicable):    Surgery date 11/52024  //  Arrival time 0800  //  Start time 1010    ARRIVAL INSTRUCTIONS:  [x] Parking the day of Surgery is located in the Main Entrance lot.  Upon entering at Entrance A, make an immediate right to the surgery reception desk    [x] Bring photo ID and insurance card    [x] Bring in a copy of Living will or Durable Power of  papers.    [x] Please be sure to arrange for a responsible adult to provide transportation to and from the hospital    [x] Please arrange for a responsible adult to be with you for the 24 hour period post procedure due to having anesthesia    [x] If you awake morning of surgery not feeling well or have temperature >100 please call [phone]    GENERAL INSTRUCTIONS:    [x] No solid foods after midnight, may have up to 8oz of water until 2 hours prior to arrival time. No gum, no candy, no mints.        [x] You may brush your teeth, do not swallow any toothpaste    [x] Take medications as instructed:  Adderall  Duloxetine  Gabapentin  Vistaril  Voquezna    [x] Check with your Endocrinologist regarding use of your insulin pump between midnight and your arrival time of 0800 on the day of surgery     [x] If diabetic and have low blood sugar or feel symptomatic, take 1-2oz apple juice only    [x] Stop herbal supplements and vitamins prior to procedure    [x] Use Albuterol inhaler if needed - Bring inhaler day of surgery    [x] Bring urine specimen day of surgery or be prepared to give specimen when you arrive    [x] Shower or bath with soap, lather and rinse well, morning of Surgery, no lotion, powders or creams to surgical site    [x] No tobacco products within 24 hours of surgery     [x] No alcohol or illegal drug use, marijuana included, within 24 hours of surgery.    [x] Jewelry, body  piercing's, eyeglasses, contact lenses and dentures are not permitted into surgery (bring cases)      [x] Please do not wear any nail polish, make up or hair products on the day of surgery    [x] You can expect a call the business day prior to procedure to notify you if your arrival time changes    [x] If you receive a survey after surgery we would greatly appreciate your comments    [x] Please notify surgeon if you develop any illness between now and time of surgery (cold, cough, sore throat, fever, nausea, vomiting) or any signs of infections  including skin, wounds, and dental.    [x]  The Outpatient Pharmacy is available to fill your prescription here on your day of surgery, ask your preop nurse for details

## 2024-11-04 ENCOUNTER — ANESTHESIA EVENT (OUTPATIENT)
Dept: OPERATING ROOM | Age: 44
End: 2024-11-04
Payer: COMMERCIAL

## 2024-11-04 NOTE — ANESTHESIA PRE PROCEDURE
Department of Anesthesiology  Preprocedure Note       Name:  Tanya Patel   Age:  44 y.o.  :  1980                                          MRN:  [id number]         Date:  2024      Surgeon: Surgeon(s):  Madison Haley MD    Procedure: Procedure(s):  LAPAROSCOPIC ROBOTIC XI ASSISTED CHOLECYSTECTOMY POSSIBLE OPEN POSSIBLE GRAM    Medications prior to admission:   Prior to Admission medications    Medication Sig Start Date End Date Taking? Authorizing Provider   VITAMIN D PO Take by mouth daily   Yes Marietta Ayon MD   Multiple Vitamin (MULTIVITAMIN PO) Take by mouth daily   Yes Marietta Ayon MD   abatacept (ORENCIA) 250 MG injection Infuse intravenously every 30 days   Yes Marietta Ayon MD   amphetamine-dextroamphetamine (ADDERALL) 20 MG tablet Take 1 tablet by mouth every morning. 24   Marietta Ayon MD   amphetamine-dextroamphetamine (ADDERALL) 5 MG tablet Take 1 tablet by mouth every evening. 24   Marietta Ayon MD   albuterol sulfate HFA (PROVENTIL;VENTOLIN;PROAIR) 108 (90 Base) MCG/ACT inhaler Inhale 2 puffs into the lungs 4 times daily 24   Marietta Ayon MD   cetirizine (ZYRTEC) 10 MG tablet Take 1 tablet by mouth daily 24   Marietta Ayon MD   hydrOXYzine pamoate (VISTARIL) 25 MG capsule Take 1 capsule by mouth 3 times daily as needed for Anxiety 24   Marietta Ayon MD   metroNIDAZOLE (METROCREAM) 0.75 % cream Apply topically 2 times daily 8/10/24   Marietta Ayon MD   montelukast (SINGULAIR) 10 MG tablet Take 1 tablet by mouth nightly 24   Marietta Ayon MD   VOQUEZNA 20 MG TABS 20 mg every morning 10/3/24   Marietta Ayon MD   levothyroxine (SYNTHROID) 100 MCG tablet Take 1 tablet by mouth daily 10/1/24   Chong [facility]ina APRN - CNS   insulin aspart (NOVOLOG) 100 UNIT/ML injection vial 100 units daily via insulin pump 10/1/24   Chong Medina APRN - CNS   Insulin

## 2024-11-05 ENCOUNTER — ANESTHESIA (OUTPATIENT)
Dept: OPERATING ROOM | Age: 44
End: 2024-11-05
Payer: COMMERCIAL

## 2024-11-05 ENCOUNTER — HOSPITAL ENCOUNTER (OUTPATIENT)
Dept: GENERAL RADIOLOGY | Age: 44
Discharge: HOME | End: 2024-11-07
Attending: SURGERY
Payer: COMMERCIAL

## 2024-11-05 ENCOUNTER — HOSPITAL ENCOUNTER (OUTPATIENT)
Age: 44
Setting detail: OUTPATIENT SURGERY
Discharge: HOME | End: 2024-11-05
Attending: SURGERY | Admitting: SURGERY
Payer: COMMERCIAL

## 2024-11-05 VITALS
HEIGHT: 64 IN | HEART RATE: 98 BPM | WEIGHT: 150 LBS | SYSTOLIC BLOOD PRESSURE: 154 MMHG | OXYGEN SATURATION: 96 % | RESPIRATION RATE: 19 BRPM | TEMPERATURE: 96.6 F | DIASTOLIC BLOOD PRESSURE: 71 MMHG | BODY MASS INDEX: 25.61 KG/M2

## 2024-11-05 DIAGNOSIS — R52 PAIN: ICD-10-CM

## 2024-11-05 DIAGNOSIS — K80.50 BILIARY COLIC: ICD-10-CM

## 2024-11-05 DIAGNOSIS — F90.9 HYPERKINETIC: ICD-10-CM

## 2024-11-05 LAB
ALBUMIN SERPL-MCNC: 4.3 G/DL (ref 3.5–5.2)
ALP SERPL-CCNC: 72 U/L (ref 35–104)
ALT SERPL-CCNC: 12 U/L (ref 0–32)
ANION GAP SERPL CALCULATED.3IONS-SCNC: 9 MMOL/L (ref 7–16)
AST SERPL-CCNC: 23 U/L (ref 0–31)
BILIRUB DIRECT SERPL-MCNC: <0.2 MG/DL (ref 0–0.3)
BILIRUB INDIRECT SERPL-MCNC: NORMAL MG/DL (ref 0–1)
BILIRUB SERPL-MCNC: 0.7 MG/DL (ref 0–1.2)
BUN SERPL-MCNC: 9 MG/DL (ref 6–20)
CALCIUM SERPL-MCNC: 9.1 MG/DL (ref 8.6–10.2)
CHLORIDE SERPL-SCNC: 99 MMOL/L (ref 98–107)
CO2 SERPL-SCNC: 26 MMOL/L (ref 22–29)
CREAT SERPL-MCNC: 0.8 MG/DL (ref 0.5–1)
EKG ATRIAL RATE: 96 BPM
EKG P AXIS: 44 DEGREES
EKG P-R INTERVAL: 120 MS
EKG Q-T INTERVAL: 348 MS
EKG QRS DURATION: 78 MS
EKG QTC CALCULATION (BAZETT): 439 MS
EKG R AXIS: 42 DEGREES
EKG T AXIS: 45 DEGREES
EKG VENTRICULAR RATE: 96 BPM
ERYTHROCYTE [DISTWIDTH] IN BLOOD BY AUTOMATED COUNT: 12.5 % (ref 11.5–15)
GFR, ESTIMATED: >90 ML/MIN/1.73M2
GLUCOSE SERPL-MCNC: 224 MG/DL (ref 74–99)
HCG, URINE, POC: NEGATIVE
HCT VFR BLD AUTO: 41.9 % (ref 34–48)
HGB BLD-MCNC: 14.3 G/DL (ref 11.5–15.5)
Lab: NORMAL
MCH RBC QN AUTO: 31.1 PG (ref 26–35)
MCHC RBC AUTO-ENTMCNC: 34.1 G/DL (ref 32–34.5)
MCV RBC AUTO: 91.1 FL (ref 80–99.9)
NEGATIVE QC PASS/FAIL: NORMAL
PLATELET # BLD AUTO: 353 K/UL (ref 130–450)
PMV BLD AUTO: 9.2 FL (ref 7–12)
POSITIVE QC PASS/FAIL: NORMAL
POTASSIUM SERPL-SCNC: 4.4 MMOL/L (ref 3.5–5)
PROT SERPL-MCNC: 6.9 G/DL (ref 6.4–8.3)
RBC # BLD AUTO: 4.6 M/UL (ref 3.5–5.5)
SODIUM SERPL-SCNC: 134 MMOL/L (ref 132–146)
WBC OTHER # BLD: 7.9 K/UL (ref 4.5–11.5)

## 2024-11-05 PROCEDURE — 7100000010 HC PHASE II RECOVERY - FIRST 15 MIN: Performed by: SURGERY

## 2024-11-05 PROCEDURE — 2500000003 HC RX 250 WO HCPCS

## 2024-11-05 PROCEDURE — C1889 IMPLANT/INSERT DEVICE, NOC: HCPCS | Performed by: SURGERY

## 2024-11-05 PROCEDURE — 47562 LAPAROSCOPIC CHOLECYSTECTOMY: CPT | Performed by: SURGERY

## 2024-11-05 PROCEDURE — 2580000003 HC RX 258: Performed by: SURGERY

## 2024-11-05 PROCEDURE — 93005 ELECTROCARDIOGRAM TRACING: CPT | Performed by: ANESTHESIOLOGY

## 2024-11-05 PROCEDURE — 3600000009 HC SURGERY ROBOT BASE: Performed by: SURGERY

## 2024-11-05 PROCEDURE — 2500000003 HC RX 250 WO HCPCS: Performed by: SURGERY

## 2024-11-05 PROCEDURE — 3700000000 HC ANESTHESIA ATTENDED CARE: Performed by: SURGERY

## 2024-11-05 PROCEDURE — 3600000019 HC SURGERY ROBOT ADDTL 15MIN: Performed by: SURGERY

## 2024-11-05 PROCEDURE — 82248 BILIRUBIN DIRECT: CPT

## 2024-11-05 PROCEDURE — 7100000011 HC PHASE II RECOVERY - ADDTL 15 MIN: Performed by: SURGERY

## 2024-11-05 PROCEDURE — 88304 TISSUE EXAM BY PATHOLOGIST: CPT

## 2024-11-05 PROCEDURE — 6370000000 HC RX 637 (ALT 250 FOR IP): Performed by: ANESTHESIOLOGY

## 2024-11-05 PROCEDURE — 6360000002 HC RX W HCPCS: Performed by: ANESTHESIOLOGY

## 2024-11-05 PROCEDURE — 6360000002 HC RX W HCPCS: Performed by: SURGERY

## 2024-11-05 PROCEDURE — 93010 ELECTROCARDIOGRAM REPORT: CPT | Performed by: INTERNAL MEDICINE

## 2024-11-05 PROCEDURE — S2900 ROBOTIC SURGICAL SYSTEM: HCPCS | Performed by: SURGERY

## 2024-11-05 PROCEDURE — 2709999900 HC NON-CHARGEABLE SUPPLY: Performed by: SURGERY

## 2024-11-05 PROCEDURE — 7100000001 HC PACU RECOVERY - ADDTL 15 MIN: Performed by: SURGERY

## 2024-11-05 PROCEDURE — 80053 COMPREHEN METABOLIC PANEL: CPT

## 2024-11-05 PROCEDURE — 6360000002 HC RX W HCPCS

## 2024-11-05 PROCEDURE — 3700000001 HC ADD 15 MINUTES (ANESTHESIA): Performed by: SURGERY

## 2024-11-05 PROCEDURE — 85027 COMPLETE CBC AUTOMATED: CPT

## 2024-11-05 PROCEDURE — 7100000000 HC PACU RECOVERY - FIRST 15 MIN: Performed by: SURGERY

## 2024-11-05 DEVICE — CLIP INT XL YEL POLYMER HEM-O-LOK WECK: Type: IMPLANTABLE DEVICE | Site: ABDOMEN | Status: FUNCTIONAL

## 2024-11-05 DEVICE — CLIP INT M L POLYMER LOK LIG HEM O LOK: Type: IMPLANTABLE DEVICE | Site: ABDOMEN | Status: FUNCTIONAL

## 2024-11-05 RX ORDER — PROCHLORPERAZINE EDISYLATE 5 MG/ML
5 INJECTION INTRAMUSCULAR; INTRAVENOUS ONCE
Status: COMPLETED | OUTPATIENT
Start: 2024-11-05 | End: 2024-11-05

## 2024-11-05 RX ORDER — SODIUM CHLORIDE 0.9 % (FLUSH) 0.9 %
5-40 SYRINGE (ML) INJECTION EVERY 12 HOURS SCHEDULED
Status: DISCONTINUED | OUTPATIENT
Start: 2024-11-05 | End: 2024-11-05 | Stop reason: HOSPADM

## 2024-11-05 RX ORDER — PHENYLEPHRINE HCL IN 0.9% NACL 1 MG/10 ML
SYRINGE (ML) INTRAVENOUS
Status: DISCONTINUED | OUTPATIENT
Start: 2024-11-05 | End: 2024-11-05 | Stop reason: SDUPTHER

## 2024-11-05 RX ORDER — ROCURONIUM BROMIDE 10 MG/ML
INJECTION, SOLUTION INTRAVENOUS
Status: DISCONTINUED | OUTPATIENT
Start: 2024-11-05 | End: 2024-11-05 | Stop reason: SDUPTHER

## 2024-11-05 RX ORDER — INDOCYANINE GREEN AND WATER 25 MG
2.5 KIT INJECTION
Status: COMPLETED | OUTPATIENT
Start: 2024-11-05 | End: 2024-11-05

## 2024-11-05 RX ORDER — FENTANYL CITRATE 50 UG/ML
INJECTION, SOLUTION INTRAMUSCULAR; INTRAVENOUS
Status: DISCONTINUED | OUTPATIENT
Start: 2024-11-05 | End: 2024-11-05 | Stop reason: SDUPTHER

## 2024-11-05 RX ORDER — ONDANSETRON 2 MG/ML
4 INJECTION INTRAMUSCULAR; INTRAVENOUS
Status: COMPLETED | OUTPATIENT
Start: 2024-11-05 | End: 2024-11-05

## 2024-11-05 RX ORDER — SODIUM CHLORIDE 9 MG/ML
INJECTION, SOLUTION INTRAVENOUS PRN
Status: DISCONTINUED | OUTPATIENT
Start: 2024-11-05 | End: 2024-11-05 | Stop reason: HOSPADM

## 2024-11-05 RX ORDER — LIDOCAINE HYDROCHLORIDE 20 MG/ML
INJECTION, SOLUTION EPIDURAL; INFILTRATION; INTRACAUDAL; PERINEURAL
Status: DISCONTINUED | OUTPATIENT
Start: 2024-11-05 | End: 2024-11-05 | Stop reason: SDUPTHER

## 2024-11-05 RX ORDER — IPRATROPIUM BROMIDE AND ALBUTEROL SULFATE 2.5; .5 MG/3ML; MG/3ML
1 SOLUTION RESPIRATORY (INHALATION)
Status: DISCONTINUED | OUTPATIENT
Start: 2024-11-05 | End: 2024-11-05 | Stop reason: HOSPADM

## 2024-11-05 RX ORDER — NALOXONE HYDROCHLORIDE 0.4 MG/ML
INJECTION, SOLUTION INTRAMUSCULAR; INTRAVENOUS; SUBCUTANEOUS PRN
Status: DISCONTINUED | OUTPATIENT
Start: 2024-11-05 | End: 2024-11-05 | Stop reason: HOSPADM

## 2024-11-05 RX ORDER — MIDAZOLAM HYDROCHLORIDE 1 MG/ML
INJECTION, SOLUTION INTRAMUSCULAR; INTRAVENOUS
Status: DISCONTINUED | OUTPATIENT
Start: 2024-11-05 | End: 2024-11-05 | Stop reason: SDUPTHER

## 2024-11-05 RX ORDER — OXYCODONE AND ACETAMINOPHEN 5; 325 MG/1; MG/1
1 TABLET ORAL ONCE
Status: COMPLETED | OUTPATIENT
Start: 2024-11-05 | End: 2024-11-05

## 2024-11-05 RX ORDER — SODIUM CHLORIDE 0.9 % (FLUSH) 0.9 %
5-40 SYRINGE (ML) INJECTION PRN
Status: DISCONTINUED | OUTPATIENT
Start: 2024-11-05 | End: 2024-11-05 | Stop reason: HOSPADM

## 2024-11-05 RX ORDER — MEPERIDINE HYDROCHLORIDE 25 MG/ML
12.5 INJECTION INTRAMUSCULAR; INTRAVENOUS; SUBCUTANEOUS EVERY 5 MIN PRN
Status: DISCONTINUED | OUTPATIENT
Start: 2024-11-05 | End: 2024-11-05 | Stop reason: HOSPADM

## 2024-11-05 RX ORDER — PROCHLORPERAZINE EDISYLATE 5 MG/ML
INJECTION INTRAMUSCULAR; INTRAVENOUS
Status: COMPLETED
Start: 2024-11-05 | End: 2024-11-05

## 2024-11-05 RX ORDER — ONDANSETRON 2 MG/ML
INJECTION INTRAMUSCULAR; INTRAVENOUS
Status: DISCONTINUED
Start: 2024-11-05 | End: 2024-11-05 | Stop reason: HOSPADM

## 2024-11-05 RX ORDER — PROPOFOL 10 MG/ML
INJECTION, EMULSION INTRAVENOUS
Status: DISCONTINUED | OUTPATIENT
Start: 2024-11-05 | End: 2024-11-05 | Stop reason: SDUPTHER

## 2024-11-05 RX ORDER — OXYCODONE AND ACETAMINOPHEN 5; 325 MG/1; MG/1
1 TABLET ORAL EVERY 6 HOURS PRN
Qty: 20 TABLET | Refills: 0 | Status: SHIPPED | OUTPATIENT
Start: 2024-11-05 | End: 2024-11-10

## 2024-11-05 RX ORDER — HYDRALAZINE HYDROCHLORIDE 20 MG/ML
10 INJECTION INTRAMUSCULAR; INTRAVENOUS
Status: DISCONTINUED | OUTPATIENT
Start: 2024-11-05 | End: 2024-11-05 | Stop reason: HOSPADM

## 2024-11-05 RX ORDER — MIDAZOLAM HYDROCHLORIDE 2 MG/2ML
2 INJECTION, SOLUTION INTRAMUSCULAR; INTRAVENOUS
Status: DISCONTINUED | OUTPATIENT
Start: 2024-11-05 | End: 2024-11-05 | Stop reason: HOSPADM

## 2024-11-05 RX ORDER — DEXAMETHASONE SODIUM PHOSPHATE 4 MG/ML
INJECTION, SOLUTION INTRA-ARTICULAR; INTRALESIONAL; INTRAMUSCULAR; INTRAVENOUS; SOFT TISSUE
Status: DISCONTINUED | OUTPATIENT
Start: 2024-11-05 | End: 2024-11-05 | Stop reason: SDUPTHER

## 2024-11-05 RX ORDER — ONDANSETRON 2 MG/ML
INJECTION INTRAMUSCULAR; INTRAVENOUS
Status: DISCONTINUED | OUTPATIENT
Start: 2024-11-05 | End: 2024-11-05 | Stop reason: SDUPTHER

## 2024-11-05 RX ORDER — LABETALOL HYDROCHLORIDE 5 MG/ML
10 INJECTION, SOLUTION INTRAVENOUS
Status: DISCONTINUED | OUTPATIENT
Start: 2024-11-05 | End: 2024-11-05 | Stop reason: HOSPADM

## 2024-11-05 RX ADMIN — HYDROMORPHONE HYDROCHLORIDE 0.5 MG: 1 INJECTION, SOLUTION INTRAMUSCULAR; INTRAVENOUS; SUBCUTANEOUS at 10:42

## 2024-11-05 RX ADMIN — ROCURONIUM BROMIDE 40 MG: 10 INJECTION, SOLUTION INTRAVENOUS at 09:19

## 2024-11-05 RX ADMIN — ONDANSETRON 4 MG: 2 INJECTION INTRAMUSCULAR; INTRAVENOUS at 10:46

## 2024-11-05 RX ADMIN — FENTANYL CITRATE 50 MCG: 50 INJECTION, SOLUTION INTRAMUSCULAR; INTRAVENOUS at 10:15

## 2024-11-05 RX ADMIN — LIDOCAINE HYDROCHLORIDE 80 MG: 20 INJECTION, SOLUTION EPIDURAL; INFILTRATION; INTRACAUDAL; PERINEURAL at 09:18

## 2024-11-05 RX ADMIN — PROPOFOL 200 MG: 10 INJECTION, EMULSION INTRAVENOUS at 09:18

## 2024-11-05 RX ADMIN — INDOCYANINE GREEN AND WATER 2.5 MG: KIT at 08:29

## 2024-11-05 RX ADMIN — WATER 2000 MG: 1 INJECTION INTRAMUSCULAR; INTRAVENOUS; SUBCUTANEOUS at 09:25

## 2024-11-05 RX ADMIN — FENTANYL CITRATE 50 MCG: 50 INJECTION, SOLUTION INTRAMUSCULAR; INTRAVENOUS at 10:18

## 2024-11-05 RX ADMIN — FENTANYL CITRATE 50 MCG: 50 INJECTION, SOLUTION INTRAMUSCULAR; INTRAVENOUS at 09:18

## 2024-11-05 RX ADMIN — FENTANYL CITRATE 50 MCG: 50 INJECTION, SOLUTION INTRAMUSCULAR; INTRAVENOUS at 09:32

## 2024-11-05 RX ADMIN — FENTANYL CITRATE 50 MCG: 50 INJECTION, SOLUTION INTRAMUSCULAR; INTRAVENOUS at 10:00

## 2024-11-05 RX ADMIN — SUGAMMADEX 250 MG: 100 INJECTION, SOLUTION INTRAVENOUS at 10:06

## 2024-11-05 RX ADMIN — Medication 200 MCG: at 09:36

## 2024-11-05 RX ADMIN — HYDROMORPHONE HYDROCHLORIDE 0.5 MG: 1 INJECTION, SOLUTION INTRAMUSCULAR; INTRAVENOUS; SUBCUTANEOUS at 10:53

## 2024-11-05 RX ADMIN — SODIUM CHLORIDE: 9 INJECTION, SOLUTION INTRAVENOUS at 09:10

## 2024-11-05 RX ADMIN — PROCHLORPERAZINE EDISYLATE 5 MG: 5 INJECTION INTRAMUSCULAR; INTRAVENOUS at 12:14

## 2024-11-05 RX ADMIN — OXYCODONE AND ACETAMINOPHEN 1 TABLET: 5; 325 TABLET ORAL at 11:43

## 2024-11-05 RX ADMIN — MIDAZOLAM 2 MG: 1 INJECTION INTRAMUSCULAR; INTRAVENOUS at 09:11

## 2024-11-05 RX ADMIN — DEXAMETHASONE SODIUM PHOSPHATE 10 MG: 4 INJECTION, SOLUTION INTRAMUSCULAR; INTRAVENOUS at 09:31

## 2024-11-05 RX ADMIN — ONDANSETRON 4 MG: 2 INJECTION INTRAMUSCULAR; INTRAVENOUS at 09:47

## 2024-11-05 ASSESSMENT — PAIN DESCRIPTION - DESCRIPTORS
DESCRIPTORS: ACHING;SHARP
DESCRIPTORS: ACHING;BURNING
DESCRIPTORS: ACHING
DESCRIPTORS: ACHING;BURNING
DESCRIPTORS: ACHING
DESCRIPTORS: BURNING;STABBING

## 2024-11-05 ASSESSMENT — PAIN DESCRIPTION - LOCATION
LOCATION: ABDOMEN

## 2024-11-05 ASSESSMENT — PAIN DESCRIPTION - ORIENTATION
ORIENTATION: INNER

## 2024-11-05 ASSESSMENT — PAIN - FUNCTIONAL ASSESSMENT
PAIN_FUNCTIONAL_ASSESSMENT: 0-10
PAIN_FUNCTIONAL_ASSESSMENT: 0-10

## 2024-11-05 ASSESSMENT — PAIN SCALES - GENERAL
PAINLEVEL_OUTOF10: 8
PAINLEVEL_OUTOF10: 6
PAINLEVEL_OUTOF10: 8

## 2024-11-05 ASSESSMENT — LIFESTYLE VARIABLES: SMOKING_STATUS: 0

## 2024-11-05 NOTE — H&P
negative  Musculoskeletal:negative  Neurological: negative  Allergic/Immunologic: negative    PHYSICAL EXAM   /65   Pulse 95   Temp 96.9 °F (36.1 °C) (Temporal)   Resp 18   Ht 1.626 m (5' 4\")   Wt 68 kg (150 lb)   SpO2 95%   BMI 25.75 kg/m²     General appearance: alert, cooperative and in no acute distress.  Eyes: Grossly normal   Lungs: Normal work of breathing  Heart: regular rate  Abdomen: moderate and RUQ tenderness, soft, non distended  Skin: No skin abnormalities  Neurologic: Alert and oriented x 3. Grossly normal  Musculoskeletal: No edema.    DATA:  HIDA: EF with fatty meal 96%    ASSESSMENT AND PLAN:     Tanya Patel is an 44 y.o. female who presents with hyperkinetic gallbladder, biliary colic    All available labs and pathology reviewed.  All imaging independently reviewed and interpreted.   All provider notes reviewed.  The above was discussed with the patient at length.      Robotic assisted Laparoscopic possible open cholecystectomy possible intraoperative cholangiogram  Risks of cholecystectomy were discussed with the patient. These include but are not limited to common bile duct injury, bile leak, liver injury, damage to blood vessels and bleeding, injury to bowel with port placement, as well as infection in the abdomen or of the incisions. I explained all other risks, benefits, alternatives, and potential complications associated with the procedure to be performed and transfusions when applicable with the patient/responsible person prior to the procedure. All of the patient's questions were answered. The patient understands and agrees to surgery.       Physician Signature: Electronically signed by Madison Haley MD, General Surgery    Send copy of H&P to PCP, Nadir Quiles Jr.,  and referring physician, No ref. provider found

## 2024-11-05 NOTE — OP NOTE
Operative Note    Tanya Patel     DATE OF PROCEDURE: 11/5/2024    SURGEON: Surgeon(s):  Madison Haley MD    PREOPERATIVE DIAGNOSIS: Biliary colic, hyperkinetic gallbladder    POSTOPERATIVE DIAGNOSIS: Chronic cholecystitis    OPERATION: Robotic Assisted Laparoscopic cholecystectomy with indocyanine green (ICG) cholangiography    ANESTHESIA: General endotracheal.     ESTIMATED BLOOD LOSS: minimal    SPECIMEN: Gallbladder    COMPLICATIONS: None.     BRIEF HISTORY: Tanya Patel is a 44 y.o.  female who presented with RUQ pain. I discussed the procedure with the patient, including the procedure, benefits, risks, and alternatives. She agreed.  ICG was given in preoperative holding prior to the procedure.    PROCEDURE: The patient was taken to the operative suite and was placed on the table in the supine position. General endotracheal anesthesia was administered. An orogastric tube was placed to decompress the stomach. The abdomen was then prepped with Chloraprep and draped in a sterile fashion.     An 8 mm incision was made at Dudley's point with an 11-blade scalpel, and then while tenting the abdominal wall upward, a Veress needle was easily inserted through the abdominal cavity. After confirmation of its placement using the saline drop test, a total of approximately 3 L of carbon dioxide was insufflated, creating a pneumoperitoneum. The Veress needle was removed, and an 8 mm trocar was inserted while tenting the abdominal wall upward. A prepared laparoscope was inserted, and the right upper quadrant was visualized. An 8-mm port was placed in the supraumbilical position, another two 8 mm ports were placed in the RLQ. There was no injury from port placement.    The robot was then placed over the patient and the ports docked. I then broke scrub and began the case at the surgeon console. The robotic scope was introduced into the abdomen and two Cadiere graspers were placed in arms 1 and 2 under

## 2024-11-05 NOTE — ANESTHESIA POSTPROCEDURE EVALUATION
Department of Anesthesiology  Postprocedure Note    Patient: Tanya Patel  MRN: [id number]  YOB: 1980  Date of evaluation: 11/5/2024    Procedure Summary       Date: 11/05/24 Room / Location: [address]    Anesthesia Start: 0910 Anesthesia Stop: 1019    Procedure: LAPAROSCOPIC ROBOTIC XI ASSISTED CHOLECYSTECTOMY (Abdomen) Diagnosis:       Biliary colic      Hyperkinetic      (Biliary colic [K80.50])      (Hyperkinetic [F90.9])    Surgeons: Madison Haley MD Responsible Provider: Carole Mcdnaiels DO    Anesthesia Type: general ASA Status: 3            Anesthesia Type: No value filed.    Lidia Phase I: Lidia Score: 10    Lidia Phase II:      Anesthesia Post Evaluation    Patient location during evaluation: PACU  Patient participation: complete - patient participated  Level of consciousness: awake  Pain score: 2  Airway patency: patent  Nausea & Vomiting: no nausea and no vomiting  Cardiovascular status: blood pressure returned to baseline and hemodynamically stable  Respiratory status: acceptable  Hydration status: euvolemic  Pain management: adequate        No notable events documented.

## 2024-11-06 ENCOUNTER — TELEPHONE (OUTPATIENT)
Dept: ADMINISTRATIVE | Age: 44
End: 2024-11-06

## 2024-11-06 NOTE — TELEPHONE ENCOUNTER
Pt called to schedule 2 week post op appt.  She had surgery 11/5, no availability within the next 2 weeks.  Staff unavailable, please contact pt.

## 2024-11-08 LAB — SURGICAL PATHOLOGY REPORT: NORMAL

## 2024-11-21 ENCOUNTER — OFFICE VISIT (OUTPATIENT)
Dept: SURGERY | Age: 44
End: 2024-11-21

## 2024-11-21 VITALS
HEART RATE: 102 BPM | TEMPERATURE: 98 F | RESPIRATION RATE: 18 BRPM | DIASTOLIC BLOOD PRESSURE: 84 MMHG | WEIGHT: 156 LBS | HEIGHT: 64 IN | SYSTOLIC BLOOD PRESSURE: 129 MMHG | BODY MASS INDEX: 26.63 KG/M2 | OXYGEN SATURATION: 98 %

## 2024-11-21 DIAGNOSIS — Z09 POSTOP CHECK: Primary | ICD-10-CM

## 2024-11-21 PROCEDURE — 99024 POSTOP FOLLOW-UP VISIT: CPT | Performed by: SURGERY

## 2024-11-21 NOTE — PROGRESS NOTES
Progress Note - Post-op follow up     Patient's Name/Date of Birth: Tanya Patel / 1980    Date: 11/21/2024    PCP: Nadir Quiles Jr., DO    Referring Physician:   Nadir Quiles Jr*  [phone]    Chief Complaint   Patient presents with    Post-Op Check     post op lap jacquelyn  Patient has no issues.        Subjective:  Patient presents to the office following surgery. The patient is tolerating a regular diet. Denies n/v/d/c. Pain controlled with pain medication.    She said she feels this has fixed her problem. Her heartburn is controlled on a new medication she has started Voquenza.    Patient's medications, allergies, past medical, surgical, social and family histories were reviewed and updated as appropriate.    Physical Exam:  /84   Pulse (!) 102   Temp 98 °F (36.7 °C)   Resp 18   Ht 1.626 m (5' 4\")   Wt 70.8 kg (156 lb)   SpO2 98%   BMI 26.78 kg/m²   General Appearance: NAD  Abdomen: soft, non-distended mild incisional tenderness, no rebound or guarding, incision C/D/I    Data Reviewed: Pathology reviewed with patient  Path Number: [id number]     -- Diagnosis --   Gallbladder, cholecystectomy: Mild chronic cholecystitis.       Assessment/Plan:    44 y.o. year old female s/p Robotic Assisted Laparoscopic cholecystectomy with indocyanine green (ICG) cholangiography     Patient recovering well from surgery  Wound care discussed  She said she was recently switched to Voquneza for her GERD and thinks it is helping better than the PPIs.  Follow up ANTONIO Haley MD 11/21/2024 9:13 AM     Send copy of H&P to PCP, Nadir Quiles Jr., DO and referring physician, Nadir Quiles Jr*

## 2025-01-07 DIAGNOSIS — E10.9 TYPE 1 DIABETES MELLITUS WITHOUT COMPLICATION (HCC): Primary | ICD-10-CM

## 2025-01-07 RX ORDER — PROCHLORPERAZINE 25 MG/1
SUPPOSITORY RECTAL
Qty: 1 EACH | Refills: 3 | Status: SHIPPED | OUTPATIENT
Start: 2025-01-07

## 2025-01-08 ASSESSMENT — RHEUMATOLOGY NEW PATIENT QUESTIONNAIRE
LOSS OF CONSCIOUSNESS: N
UNEXPLAINED HEARING LOSS: N
DEPRESSION: Y
MORNING STIFFNESS: Y
ABNORMAL URINE: N
DIFFICULTY BREATHING LYING DOWN: Y
RASH: N
HEARTBURN OR REFLUX: Y
SWOLLEN OR TENDER GLANDS: N
DOUBLE OR BLURRED VISION: Y
RASH OR ULCERS: N
EYE PAIN: N
EASILY LOSING TEMPER: N
BLACK STOOLS: N
EYE DRYNESS: Y
NAUSEA: Y
SKIN TIGHTNESS: N
JAUNDICE: N
BEHAVIORAL CHANGES: N
NIGHT SWEATS: Y
CHEST PAIN: N
DRYNESS OF MOUTH: Y
HOARSE VOICE: N
JOINT PAIN: Y
SORES IN MOUTH OR NOSE: N
SWOLLEN LEGS OR FEET: N
DIFFICULTY SWALLOWING: Y
SEIZURES: N
HEADACHES: N
VOMITING OF BLOOD OR COFFEE GROUND CONSISTENCY MATERIAL: N
NUMBNESS OR TINGLING IN HANDS OR FEET: Y
PERSISTENT DIARRHEA: N
UNUSUAL BLEEDING: N
MUSCLE WEAKNESS: Y
COLOR CHANGES OF HANDS OR FEET IN THE COLD: N
PAIN OR BURNING ON URINATION: N
DIFFICULTY FALLING ASLEEP: Y
INCREASED SUSCEPTIBILITY TO INFECTION: N
NODULES/BUMPS: N
AGITATION: N
UNUSUAL FATIGUE: Y
COUGH: N
MEMORY LOSS: Y
SUN SENSITIVE (SUN ALLERGY): N
JOINT SWELLING: Y
SHORTNESS OF BREATH: Y
FAINTING: N
FEVER: N
UNUSUALLY RAPID OR SLOWED HEART RATE: Y
BLOOD IN STOOLS: N
VAGINAL DRYNESS: Y
HOW WOULD YOU DESCRIBE YOUR STIFFNESS ON AVERAGE: MODERATE
EXCESSIVE HAIR LOSS (MORE THAN YOUR NORM): N
UNEXPLAINED WEIGHT CHANGE: N
ANXIETY: Y
MORNING STIFFNESS IN LOWER BACK: Y
SKIN REDNESS: N
LOSS OF VISION: N
STOMACH PAIN: N
ANEMIA: Y
EYE REDNESS: Y
EASY BRUISING: Y
DIFFICULTY STAYING ASLEEP: Y

## 2025-01-10 ENCOUNTER — HOSPITAL ENCOUNTER (OUTPATIENT)
Age: 45
Discharge: HOME | End: 2025-01-10
Payer: COMMERCIAL

## 2025-01-10 ENCOUNTER — OFFICE VISIT (OUTPATIENT)
Dept: RHEUMATOLOGY | Age: 45
End: 2025-01-10
Payer: COMMERCIAL

## 2025-01-10 VITALS
SYSTOLIC BLOOD PRESSURE: 147 MMHG | DIASTOLIC BLOOD PRESSURE: 83 MMHG | BODY MASS INDEX: 25.61 KG/M2 | HEIGHT: 64 IN | WEIGHT: 150 LBS | HEART RATE: 128 BPM

## 2025-01-10 DIAGNOSIS — Z79.899 HIGH RISK MEDICATION USE: ICD-10-CM

## 2025-01-10 DIAGNOSIS — E10.40 TYPE 1 DIABETES MELLITUS WITH DIABETIC NEUROPATHY (HCC): ICD-10-CM

## 2025-01-10 DIAGNOSIS — D84.9 IMMUNOSUPPRESSION (HCC): ICD-10-CM

## 2025-01-10 DIAGNOSIS — M06.09 RHEUMATOID ARTHRITIS OF MULTIPLE SITES WITH NEGATIVE RHEUMATOID FACTOR (HCC): Primary | ICD-10-CM

## 2025-01-10 DIAGNOSIS — M15.9 GENERALIZED OSTEOARTHRITIS: ICD-10-CM

## 2025-01-10 DIAGNOSIS — M79.7 FIBROMYALGIA: ICD-10-CM

## 2025-01-10 DIAGNOSIS — E78.2 MIXED HYPERLIPIDEMIA: ICD-10-CM

## 2025-01-10 DIAGNOSIS — M06.09 RHEUMATOID ARTHRITIS OF MULTIPLE SITES WITH NEGATIVE RHEUMATOID FACTOR (HCC): ICD-10-CM

## 2025-01-10 LAB
ALBUMIN SERPL-MCNC: 4.5 G/DL (ref 3.5–5.2)
ALP SERPL-CCNC: 69 U/L (ref 35–104)
ALT SERPL-CCNC: 12 U/L (ref 0–32)
ANION GAP SERPL CALCULATED.3IONS-SCNC: 9 MMOL/L (ref 7–16)
AST SERPL-CCNC: 23 U/L (ref 0–31)
BASOPHILS # BLD: 0.15 K/UL (ref 0–0.2)
BASOPHILS NFR BLD: 2 % (ref 0–2)
BILIRUB SERPL-MCNC: 0.4 MG/DL (ref 0–1.2)
BUN SERPL-MCNC: 8 MG/DL (ref 6–20)
CALCIUM SERPL-MCNC: 9.4 MG/DL (ref 8.6–10.2)
CHLORIDE SERPL-SCNC: 102 MMOL/L (ref 98–107)
CO2 SERPL-SCNC: 27 MMOL/L (ref 22–29)
CREAT SERPL-MCNC: 0.9 MG/DL (ref 0.5–1)
CRP SERPL HS-MCNC: <3 MG/L (ref 0–5)
EOSINOPHIL # BLD: 1.1 K/UL (ref 0.05–0.5)
EOSINOPHILS RELATIVE PERCENT: 12 % (ref 0–6)
ERYTHROCYTE [DISTWIDTH] IN BLOOD BY AUTOMATED COUNT: 13.2 % (ref 11.5–15)
ERYTHROCYTE [SEDIMENTATION RATE] IN BLOOD BY WESTERGREN METHOD: 5 MM/HR (ref 0–20)
GFR, ESTIMATED: 83 ML/MIN/1.73M2
GLUCOSE SERPL-MCNC: 119 MG/DL (ref 74–99)
HCT VFR BLD AUTO: 45.2 % (ref 34–48)
HGB BLD-MCNC: 14.7 G/DL (ref 11.5–15.5)
IMM GRANULOCYTES # BLD AUTO: 0.04 K/UL (ref 0–0.58)
IMM GRANULOCYTES NFR BLD: 0 % (ref 0–5)
LYMPHOCYTES NFR BLD: 2.2 K/UL (ref 1.5–4)
LYMPHOCYTES RELATIVE PERCENT: 24 % (ref 20–42)
MCH RBC QN AUTO: 31.1 PG (ref 26–35)
MCHC RBC AUTO-ENTMCNC: 32.5 G/DL (ref 32–34.5)
MCV RBC AUTO: 95.8 FL (ref 80–99.9)
MONOCYTES NFR BLD: 0.4 K/UL (ref 0.1–0.95)
MONOCYTES NFR BLD: 4 % (ref 2–12)
NEUTROPHILS NFR BLD: 58 % (ref 43–80)
NEUTS SEG NFR BLD: 5.39 K/UL (ref 1.8–7.3)
PLATELET # BLD AUTO: 440 K/UL (ref 130–450)
PMV BLD AUTO: 9.5 FL (ref 7–12)
POTASSIUM SERPL-SCNC: 4.5 MMOL/L (ref 3.5–5)
PROT SERPL-MCNC: 8.1 G/DL (ref 6.4–8.3)
RBC # BLD AUTO: 4.72 M/UL (ref 3.5–5.5)
RHEUMATOID FACT SER NEPH-ACNC: <10 IU/ML (ref 0–13)
SEND OUT REPORT: NORMAL
SODIUM SERPL-SCNC: 138 MMOL/L (ref 132–146)
TEST NAME: NORMAL
WBC OTHER # BLD: 9.3 K/UL (ref 4.5–11.5)

## 2025-01-10 PROCEDURE — 2022F DILAT RTA XM EVC RTNOPTHY: CPT | Performed by: INTERNAL MEDICINE

## 2025-01-10 PROCEDURE — G8427 DOCREV CUR MEDS BY ELIG CLIN: HCPCS | Performed by: INTERNAL MEDICINE

## 2025-01-10 PROCEDURE — 3077F SYST BP >= 140 MM HG: CPT | Performed by: INTERNAL MEDICINE

## 2025-01-10 PROCEDURE — 86140 C-REACTIVE PROTEIN: CPT

## 2025-01-10 PROCEDURE — 86200 CCP ANTIBODY: CPT

## 2025-01-10 PROCEDURE — 1036F TOBACCO NON-USER: CPT | Performed by: INTERNAL MEDICINE

## 2025-01-10 PROCEDURE — 85652 RBC SED RATE AUTOMATED: CPT

## 2025-01-10 PROCEDURE — 3079F DIAST BP 80-89 MM HG: CPT | Performed by: INTERNAL MEDICINE

## 2025-01-10 PROCEDURE — G8419 CALC BMI OUT NRM PARAM NOF/U: HCPCS | Performed by: INTERNAL MEDICINE

## 2025-01-10 PROCEDURE — 80053 COMPREHEN METABOLIC PANEL: CPT

## 2025-01-10 PROCEDURE — 85025 COMPLETE CBC W/AUTO DIFF WBC: CPT

## 2025-01-10 PROCEDURE — 86431 RHEUMATOID FACTOR QUANT: CPT

## 2025-01-10 PROCEDURE — 99205 OFFICE O/P NEW HI 60 MIN: CPT | Performed by: INTERNAL MEDICINE

## 2025-01-10 PROCEDURE — G2211 COMPLEX E/M VISIT ADD ON: HCPCS | Performed by: INTERNAL MEDICINE

## 2025-01-10 PROCEDURE — 3046F HEMOGLOBIN A1C LEVEL >9.0%: CPT | Performed by: INTERNAL MEDICINE

## 2025-01-10 PROCEDURE — 36415 COLL VENOUS BLD VENIPUNCTURE: CPT

## 2025-01-10 ASSESSMENT — ENCOUNTER SYMPTOMS
NAUSEA: 0
VOMITING: 0
COUGH: 0
DIARRHEA: 0
COLOR CHANGE: 0
ABDOMINAL PAIN: 0
SHORTNESS OF BREATH: 1
TROUBLE SWALLOWING: 0

## 2025-01-10 NOTE — PROGRESS NOTES
12/12/2024    K 4.9 12/12/2024    CL 93 (L) 12/12/2024    CO2 23 12/12/2024    BUN 8 12/12/2024    CREATININE 0.89 12/12/2024    GLUCOSE 356 (H) 12/12/2024    CALCIUM 10.7 (H) 12/12/2024    BILITOT 0.6 12/12/2024    ALKPHOS 82 12/12/2024    AST 17 12/12/2024    ALT 10 12/12/2024    LABGLOM >90 11/05/2024    GFRAA >60 10/14/2022     Lab Results   Component Value Date    CHERIE NEGATIVE 02/04/2016     No components found for: \"RHEUMFACTOR\"  Lab Results   Component Value Date    SEDRATE 12 02/04/2016     Lab Results   Component Value Date    CRP 0.1 02/04/2016        The patient (or guardian, if applicable) and other individuals in attendance with the patient were advised that Artificial Intelligence will be utilized during this visit to record, process the conversation to generate a clinical note, and support improvement of the AI technology. The patient (or guardian, if applicable) and other individuals in attendance at the appointment consented to the use of AI, including the recording.                   An electronic signature was used to authenticate this note.    This note was generated with a voice recognition dictation system. Please disregard any errors or omission which have escaped my review.    --Chong Pérez DO

## 2025-01-13 LAB — CCP AB SER IA-ACNC: 1.7 U/ML (ref 0–7)

## 2025-01-16 LAB
SEND OUT REPORT: NORMAL
TEST NAME: NORMAL

## 2025-01-23 ENCOUNTER — TELEPHONE (OUTPATIENT)
Dept: RHEUMATOLOGY | Age: 45
End: 2025-01-23

## 2025-01-23 RX ORDER — PREDNISONE 5 MG/1
TABLET ORAL
Qty: 21 TABLET | Refills: 0 | Status: SHIPPED | OUTPATIENT
Start: 2025-01-23

## 2025-02-06 ENCOUNTER — OFFICE VISIT (OUTPATIENT)
Dept: SURGERY | Age: 45
End: 2025-02-06
Payer: COMMERCIAL

## 2025-02-06 VITALS
OXYGEN SATURATION: 97 % | DIASTOLIC BLOOD PRESSURE: 93 MMHG | SYSTOLIC BLOOD PRESSURE: 150 MMHG | TEMPERATURE: 98.3 F | HEIGHT: 64 IN | HEART RATE: 114 BPM | RESPIRATION RATE: 18 BRPM | WEIGHT: 160 LBS | BODY MASS INDEX: 27.31 KG/M2

## 2025-02-06 DIAGNOSIS — Z12.11 COLON CANCER SCREENING: ICD-10-CM

## 2025-02-06 DIAGNOSIS — K59.09 CHRONIC CONSTIPATION: Primary | ICD-10-CM

## 2025-02-06 PROCEDURE — 99213 OFFICE O/P EST LOW 20 MIN: CPT | Performed by: SURGERY

## 2025-02-06 PROCEDURE — 3077F SYST BP >= 140 MM HG: CPT | Performed by: SURGERY

## 2025-02-06 PROCEDURE — 1036F TOBACCO NON-USER: CPT | Performed by: SURGERY

## 2025-02-06 PROCEDURE — G8419 CALC BMI OUT NRM PARAM NOF/U: HCPCS | Performed by: SURGERY

## 2025-02-06 PROCEDURE — G8428 CUR MEDS NOT DOCUMENT: HCPCS | Performed by: SURGERY

## 2025-02-06 PROCEDURE — 3080F DIAST BP >= 90 MM HG: CPT | Performed by: SURGERY

## 2025-02-06 RX ORDER — SODIUM CHLORIDE 9 MG/ML
INJECTION, SOLUTION INTRAVENOUS CONTINUOUS
OUTPATIENT
Start: 2025-02-06

## 2025-02-06 NOTE — PATIENT INSTRUCTIONS
General Surgery - Dr. Madison Domingo MD, FACS    Preoperative Instructions    Please read the following information very carefully. It contains information that is necessary to best prepare you for your upcoming procedure.    Make arrangements for a  to take you to and from your procedure. YOU MUST HAVE SOMEONE DRIVE YOU HOME - this cannot be a taxi or public transportation. You will not be administered anesthesia without someone to go home and be at home with you that day.  Nothing to eat or drink after midnight the night before your procedure.  Follow your bowel prep instructions if you have them for this procedure.    3 days prior to your procedure: Stop taking blood thinners like Coumadin or Plavix or Xarelto.  5 days prior to your procedure: Stop taking Aspirin or Aspirin containing products.   If you cannot stop any of these medications prior to your procedure, please contact our office.    Medications morning of procedure:  Only heart, breathing, blood pressure, and seizure medications are permitted on the morning of your procedure. These medications can be taken with a sip of water.    IF YOU ARE UNABLE TO KEEP THE ABOVE SCHEDULED PROCEDURE, YOU MUST NOTIFY DR. DOMINGO'S OFFICE [phone]. NOT THE FACILITY.    NO CHEWING GUM OR CHEWING TOBACCO AFTER MIDNIGHT ON DAY OF PROCEDURE.    YOU MUST HAVE TRANSPORTATION TO AND FROM THE FACILITY.    Colonoscopy: Before Your Procedure  What is a colonoscopy?     A colonoscopy is a test that lets a doctor look inside your colon. The doctor uses a thin, lighted tube called a colonoscope to look for problems. These include small growths called polyps, cancer, or bleeding.  During the test, the doctor can take samples of tissue that can be checked for cancer or other problems. This is called a biopsy. The doctor can also take out polyps.  Before the test, you will need to stop eating solid foods. You also will be given instructions on how to clean out

## 2025-02-06 NOTE — PROGRESS NOTES
General Surgery History and Physical    Patient's Name/Date of Birth: Tanya Patel / 1980    Date: 2/6/2025    PCP: Nadir Quiles Jr., DO    Referring Physician:   Sarah Alcantar,*  [phone]    CHIEF COMPLAINT:    Chief Complaint   Patient presents with    Other    Colonoscopy     Screening for colon cancer   Has not taken her BP meds this morning.          HISTORY OF PRESENT ILLNESS:    Tanya Patel is an 44 y.o. female who presents for a colonoscopy. The patient hs chronic constipation. No nausea, vomiting, diarrhea. No changes in stool caliber. No bloody or black stools. No abdominal pain. No unintentional weight loss. No family history of colon cancer. The patient has a known history of: no known risk factors. The patient has never had a colonoscopy before.     Past Medical History:   Past Medical History:   Diagnosis Date    Anxiety     Depression     Diabetes mellitus type 1, controlled, without complications (HCC) 01/08/2016    Environmental allergies     Epicondylitis, lateral 02/2017    right    Gallbladder disease     GERD (gastroesophageal reflux disease)     Glaucoma     Hypertension     IBS (irritable bowel syndrome)     Insulin pump status has insulin pump    Dexcom. follows with [facility]    AGNES on CPAP     SIRS (systemic inflammatory response syndrome) ([facility]) 01/08/2016    Thyroid disease     Trigger finger     right index and little finger        Past Surgical History:   Past Surgical History:   Procedure Laterality Date    ARM SURGERY Right 4/26/2023    RIGHT ULNAR NERVE DECOMPRESSION AT THE ELBOW WITH POSSIBLE TRANSPOSITION RIGHT RADIAL NERVE DECOMPRESSION AT THE ELBOW performed by Sohan Coles MD at [facility] OR    CARPAL TUNNEL RELEASE  2011    left hand     CHOLECYSTECTOMY N/A 11/5/2024    LAPAROSCOPIC ROBOTIC XI ASSISTED CHOLECYSTECTOMY performed by Madison Haley MD at [facility] OR    DEBRIDEMENT Right 02/14/2017    Right

## 2025-02-11 ENCOUNTER — APPOINTMENT (OUTPATIENT)
Dept: GENERAL RADIOLOGY | Age: 45
End: 2025-02-11
Payer: COMMERCIAL

## 2025-02-11 ENCOUNTER — HOSPITAL ENCOUNTER (EMERGENCY)
Age: 45
Discharge: HOME | End: 2025-02-11
Attending: EMERGENCY MEDICINE
Payer: COMMERCIAL

## 2025-02-11 VITALS
RESPIRATION RATE: 18 BRPM | TEMPERATURE: 98.5 F | HEART RATE: 96 BPM | DIASTOLIC BLOOD PRESSURE: 72 MMHG | SYSTOLIC BLOOD PRESSURE: 140 MMHG | OXYGEN SATURATION: 98 %

## 2025-02-11 DIAGNOSIS — J45.901 EXACERBATION OF ASTHMA, UNSPECIFIED ASTHMA SEVERITY, UNSPECIFIED WHETHER PERSISTENT: ICD-10-CM

## 2025-02-11 DIAGNOSIS — J10.1 INFLUENZA A: Primary | ICD-10-CM

## 2025-02-11 LAB
ALBUMIN SERPL-MCNC: 4.2 G/DL (ref 3.5–5.2)
ALP SERPL-CCNC: 77 U/L (ref 35–104)
ALT SERPL-CCNC: 13 U/L (ref 0–32)
ANION GAP SERPL CALCULATED.3IONS-SCNC: 8 MMOL/L (ref 7–16)
AST SERPL-CCNC: 19 U/L (ref 0–31)
BASOPHILS # BLD: 0.05 K/UL (ref 0–0.2)
BASOPHILS NFR BLD: 1 % (ref 0–2)
BILIRUB SERPL-MCNC: 0.3 MG/DL (ref 0–1.2)
BUN SERPL-MCNC: 6 MG/DL (ref 6–20)
CALCIUM SERPL-MCNC: 9 MG/DL (ref 8.6–10.2)
CHLORIDE SERPL-SCNC: 101 MMOL/L (ref 98–107)
CO2 SERPL-SCNC: 27 MMOL/L (ref 22–29)
CREAT SERPL-MCNC: 0.8 MG/DL (ref 0.5–1)
EKG ATRIAL RATE: 94 BPM
EKG P AXIS: 57 DEGREES
EKG P-R INTERVAL: 128 MS
EKG Q-T INTERVAL: 342 MS
EKG QRS DURATION: 84 MS
EKG QTC CALCULATION (BAZETT): 427 MS
EKG R AXIS: 55 DEGREES
EKG T AXIS: 62 DEGREES
EKG VENTRICULAR RATE: 94 BPM
EOSINOPHIL # BLD: 0.31 K/UL (ref 0.05–0.5)
EOSINOPHILS RELATIVE PERCENT: 3 % (ref 0–6)
ERYTHROCYTE [DISTWIDTH] IN BLOOD BY AUTOMATED COUNT: 13.1 % (ref 11.5–15)
GFR, ESTIMATED: >90 ML/MIN/1.73M2
GLUCOSE SERPL-MCNC: 154 MG/DL (ref 74–99)
HCG UR QL: NEGATIVE
HCT VFR BLD AUTO: 39.1 % (ref 34–48)
HGB BLD-MCNC: 13.7 G/DL (ref 11.5–15.5)
IMM GRANULOCYTES # BLD AUTO: 0.05 K/UL (ref 0–0.58)
IMM GRANULOCYTES NFR BLD: 1 % (ref 0–5)
LYMPHOCYTES NFR BLD: 1.79 K/UL (ref 1.5–4)
LYMPHOCYTES RELATIVE PERCENT: 17 % (ref 20–42)
MCH RBC QN AUTO: 31.4 PG (ref 26–35)
MCHC RBC AUTO-ENTMCNC: 35 G/DL (ref 32–34.5)
MCV RBC AUTO: 89.7 FL (ref 80–99.9)
MONOCYTES NFR BLD: 0.47 K/UL (ref 0.1–0.95)
MONOCYTES NFR BLD: 5 % (ref 2–12)
NEUTROPHILS NFR BLD: 74 % (ref 43–80)
NEUTS SEG NFR BLD: 7.61 K/UL (ref 1.8–7.3)
PLATELET # BLD AUTO: 311 K/UL (ref 130–450)
PMV BLD AUTO: 9.1 FL (ref 7–12)
POTASSIUM SERPL-SCNC: 4.3 MMOL/L (ref 3.5–5)
PROT SERPL-MCNC: 7.1 G/DL (ref 6.4–8.3)
RBC # BLD AUTO: 4.36 M/UL (ref 3.5–5.5)
SODIUM SERPL-SCNC: 136 MMOL/L (ref 132–146)
TROPONIN I SERPL HS-MCNC: 8 NG/L (ref 0–9)
WBC OTHER # BLD: 10.3 K/UL (ref 4.5–11.5)

## 2025-02-11 PROCEDURE — 80053 COMPREHEN METABOLIC PANEL: CPT

## 2025-02-11 PROCEDURE — 2580000003 HC RX 258

## 2025-02-11 PROCEDURE — 85025 COMPLETE CBC W/AUTO DIFF WBC: CPT

## 2025-02-11 PROCEDURE — 96360 HYDRATION IV INFUSION INIT: CPT

## 2025-02-11 PROCEDURE — 93005 ELECTROCARDIOGRAM TRACING: CPT

## 2025-02-11 PROCEDURE — 99285 EMERGENCY DEPT VISIT HI MDM: CPT

## 2025-02-11 PROCEDURE — 71045 X-RAY EXAM CHEST 1 VIEW: CPT

## 2025-02-11 PROCEDURE — 84484 ASSAY OF TROPONIN QUANT: CPT

## 2025-02-11 PROCEDURE — 93010 ELECTROCARDIOGRAM REPORT: CPT | Performed by: INTERNAL MEDICINE

## 2025-02-11 PROCEDURE — 6370000000 HC RX 637 (ALT 250 FOR IP)

## 2025-02-11 PROCEDURE — 84703 CHORIONIC GONADOTROPIN ASSAY: CPT

## 2025-02-11 RX ORDER — PRENATAL VIT 27,CALC/IRON/FA 60 MG-1 MG
1 TABLET ORAL DAILY
COMMUNITY

## 2025-02-11 RX ORDER — PREDNISONE 20 MG/1
50 TABLET ORAL 2 TIMES DAILY
Qty: 20 TABLET | Refills: 0 | Status: SHIPPED | OUTPATIENT
Start: 2025-02-11 | End: 2025-02-15

## 2025-02-11 RX ORDER — 0.9 % SODIUM CHLORIDE 0.9 %
1000 INTRAVENOUS SOLUTION INTRAVENOUS ONCE
Status: COMPLETED | OUTPATIENT
Start: 2025-02-11 | End: 2025-02-11

## 2025-02-11 RX ORDER — PREDNISONE 20 MG/1
60 TABLET ORAL ONCE
Status: COMPLETED | OUTPATIENT
Start: 2025-02-11 | End: 2025-02-11

## 2025-02-11 RX ORDER — IPRATROPIUM BROMIDE AND ALBUTEROL SULFATE 2.5; .5 MG/3ML; MG/3ML
3 SOLUTION RESPIRATORY (INHALATION) ONCE
Status: COMPLETED | OUTPATIENT
Start: 2025-02-11 | End: 2025-02-11

## 2025-02-11 RX ADMIN — SODIUM CHLORIDE 1000 ML: 9 INJECTION, SOLUTION INTRAVENOUS at 09:21

## 2025-02-11 RX ADMIN — PREDNISONE 60 MG: 20 TABLET ORAL at 07:05

## 2025-02-11 RX ADMIN — IPRATROPIUM BROMIDE AND ALBUTEROL SULFATE 3 DOSE: 2.5; .5 SOLUTION RESPIRATORY (INHALATION) at 07:03

## 2025-02-11 NOTE — ED PROVIDER NOTES
[facility] EMERGENCY DEPARTMENT  EMERGENCY DEPARTMENT ENCOUNTER        Pt Name: Tanya Patel  MRN: [id number]  Birthdate 1980  Date of evaluation: 2/11/2025  Provider: Milton Connelly MD  PCP: Nadir Quiles Jr., DO  Note Started: 6:21 AM EST 2/11/25    CHIEF COMPLAINT       Chief Complaint   Patient presents with    Shortness of Breath    Cough     ON TAMIFLU SINCE SATURDAY.  NO RELIEF.  POSITIVE FOR FLU A       HISTORY OF PRESENT ILLNESS: 1 or more Elements   History From: Patient    Limitations to history : None  Social Determinants : None    Tanya Patel is a 44 y.o. female who presents to the ED with complaints of shortness of breath, cough.  Patient states that symptoms started last Saturday when she tested positive for influenza.  Patient was given Tamiflu and cough medication. Patient stated symptoms have not been improving.  Patient states that she has a cough with green phlegm.  Patient states that she was recently diagnosed with asthma and has been using her albuterol inhaler with no improvement in symptoms.  Denies any abdominal pain, dysuria, hematuria.      Nursing Notes were all reviewed and agreed with or any disagreements were addressed in the HPI.    ROS:   Pertinent positives and negatives are stated within HPI, all other systems reviewed and are negative.      --------------------------------------------- PAST HISTORY ---------------------------------------------  Past Medical History:  has a past medical history of Anxiety, Depression, Diabetes mellitus type 1, controlled, without complications ([facility]), Environmental allergies, Epicondylitis, lateral, Gallbladder disease, GERD (gastroesophageal reflux disease), Glaucoma, Hypertension, IBS (irritable bowel syndrome), Insulin pump status, AGNES on CPAP, SIRS (systemic inflammatory response syndrome) ([facility]), Thyroid disease, and Trigger finger.    Past Surgical History:  has a past surgical

## 2025-02-11 NOTE — DISCHARGE INSTRUCTIONS
We gave the first dose of steroids here so you can begin the prescription tomorrow morning.  Use your inhaler every 4-6 hours as needed for shortness of breath and wheezing.  Return to the emergency department if worsening or concerning symptoms.

## 2025-02-21 ENCOUNTER — OFFICE VISIT (OUTPATIENT)
Dept: CARDIOLOGY CLINIC | Age: 45
End: 2025-02-21
Payer: COMMERCIAL

## 2025-02-21 VITALS
BODY MASS INDEX: 27.14 KG/M2 | SYSTOLIC BLOOD PRESSURE: 126 MMHG | DIASTOLIC BLOOD PRESSURE: 80 MMHG | HEIGHT: 64 IN | RESPIRATION RATE: 20 BRPM | HEART RATE: 117 BPM | WEIGHT: 159 LBS

## 2025-02-21 DIAGNOSIS — I70.90 ATHEROSCLEROSIS: Primary | ICD-10-CM

## 2025-02-21 PROCEDURE — 3074F SYST BP LT 130 MM HG: CPT | Performed by: INTERNAL MEDICINE

## 2025-02-21 PROCEDURE — 1036F TOBACCO NON-USER: CPT | Performed by: INTERNAL MEDICINE

## 2025-02-21 PROCEDURE — G8427 DOCREV CUR MEDS BY ELIG CLIN: HCPCS | Performed by: INTERNAL MEDICINE

## 2025-02-21 PROCEDURE — 3079F DIAST BP 80-89 MM HG: CPT | Performed by: INTERNAL MEDICINE

## 2025-02-21 PROCEDURE — 93000 ELECTROCARDIOGRAM COMPLETE: CPT | Performed by: INTERNAL MEDICINE

## 2025-02-21 PROCEDURE — 99204 OFFICE O/P NEW MOD 45 MIN: CPT | Performed by: INTERNAL MEDICINE

## 2025-02-21 PROCEDURE — G8419 CALC BMI OUT NRM PARAM NOF/U: HCPCS | Performed by: INTERNAL MEDICINE

## 2025-02-21 RX ORDER — METOPROLOL TARTRATE 25 MG/1
25 TABLET, FILM COATED ORAL 2 TIMES DAILY
Qty: 60 TABLET | Refills: 5 | Status: SHIPPED | OUTPATIENT
Start: 2025-02-21

## 2025-02-21 RX ORDER — ATORVASTATIN CALCIUM 40 MG/1
40 TABLET, FILM COATED ORAL DAILY
Qty: 30 TABLET | Refills: 5 | Status: SHIPPED | OUTPATIENT
Start: 2025-02-21

## 2025-02-21 RX ORDER — BUDESONIDE 180 UG/1
AEROSOL, POWDER RESPIRATORY (INHALATION)
COMMUNITY
Start: 2025-02-11

## 2025-02-21 RX ORDER — LOSARTAN POTASSIUM 50 MG/1
50 TABLET ORAL DAILY
Qty: 30 TABLET | Refills: 5 | Status: SHIPPED | OUTPATIENT
Start: 2025-02-21

## 2025-02-21 ASSESSMENT — ENCOUNTER SYMPTOMS
BACK PAIN: 0
SHORTNESS OF BREATH: 0
VOMITING: 0
CHEST TIGHTNESS: 0
BLOOD IN STOOL: 0
ABDOMINAL PAIN: 0
NAUSEA: 0
COUGH: 0

## 2025-02-21 NOTE — PROGRESS NOTES
NECK: No JVD. No carotid bruits. No neck masses.    CARDIOVASCULAR: Elevated rate, regular rhythm, normal S1, S2, no MRG    LUNGS: Clear to auscultation bilaterally, no wheezing.    ABDOMEN: Abdomen is soft and not distended. No tenderness.    EXTREMITIES: There is no pedal edema.   MUSCULOSKELETAL: No joint swelling. Normal range of motion    SKIN: Skin is moist. No ulcers or lesions.   NEUROLOGICAL: No evidence of obvious neurological deficits.    PSYCHOLOGICAL: Patient is in normal mood.          LABORATORY DATA:   Lab Results   Component Value Date/Time    WBC 10.3 02/11/2025 06:45 AM    HGB 13.7 02/11/2025 06:45 AM    HCT 39.1 02/11/2025 06:45 AM     02/11/2025 06:45 AM      Lab Results   Component Value Date/Time     02/11/2025 06:45 AM    K 4.3 02/11/2025 06:45 AM    K 4.2 10/11/2022 04:50 PM    CO2 27 02/11/2025 06:45 AM    BUN 6 02/11/2025 06:45 AM    CREATININE 0.8 02/11/2025 06:45 AM    MG 1.7 08/13/2022 03:29 PM      No components found for: \"HGBA1C\"   Lab Results   Component Value Date/Time    TSH 23.0 12/12/2024 10:20 AM      No components found for: \"LDLCALC\"           ASSESSMENT & PLAN:     44 y.o. female referred for cardiac evaluation and transferring her care from [facility]. Past medical history of DM, HLD, HTN, GERD, IBS, AGNES (on CPAP), depression and anxiety, ADHD    Sinus tachycardia:  Normal hgb, TSH, no DVT or PE. Normal TTE. Most likely related to Adderall use.  Advised to switch Adderall to different medication  Start metoprolol 25 g twice daily can further increase dose if need to control her heart rate.  Can consider adding diltiazem if further need for rate control  Htn:  Continue losartan  HLD:  Continue atorvastatin 40 mg daily    RTC in 3 months    Blake Marmolejo MD  Interventional cardiology / structural heart disease    [facility]  [address] [place] [phone]

## 2025-03-01 ENCOUNTER — APPOINTMENT (OUTPATIENT)
Dept: GENERAL RADIOLOGY | Age: 45
End: 2025-03-01
Payer: COMMERCIAL

## 2025-03-01 ENCOUNTER — HOSPITAL ENCOUNTER (EMERGENCY)
Age: 45
Discharge: HOME | End: 2025-03-01
Payer: COMMERCIAL

## 2025-03-01 ENCOUNTER — APPOINTMENT (OUTPATIENT)
Dept: CT IMAGING | Age: 45
End: 2025-03-01
Payer: COMMERCIAL

## 2025-03-01 VITALS
DIASTOLIC BLOOD PRESSURE: 83 MMHG | OXYGEN SATURATION: 99 % | SYSTOLIC BLOOD PRESSURE: 147 MMHG | RESPIRATION RATE: 18 BRPM | HEART RATE: 81 BPM | TEMPERATURE: 98.1 F

## 2025-03-01 DIAGNOSIS — S40.012A CONTUSION OF LEFT SHOULDER, INITIAL ENCOUNTER: Primary | ICD-10-CM

## 2025-03-01 DIAGNOSIS — S50.02XA CONTUSION OF LEFT ELBOW, INITIAL ENCOUNTER: ICD-10-CM

## 2025-03-01 DIAGNOSIS — S39.012A LUMBOSACRAL STRAIN, INITIAL ENCOUNTER: ICD-10-CM
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
## 2025-03-01 PROCEDURE — 72131 CT LUMBAR SPINE W/O DYE: CPT

## 2025-03-01 PROCEDURE — 73080 X-RAY EXAM OF ELBOW: CPT

## 2025-03-01 PROCEDURE — 99284 EMERGENCY DEPT VISIT MOD MDM: CPT

## 2025-03-01 PROCEDURE — 73502 X-RAY EXAM HIP UNI 2-3 VIEWS: CPT

## 2025-03-01 PROCEDURE — 6370000000 HC RX 637 (ALT 250 FOR IP): Performed by: NURSE PRACTITIONER

## 2025-03-01 PROCEDURE — 73030 X-RAY EXAM OF SHOULDER: CPT

## 2025-03-01 RX ORDER — HYDROCODONE BITARTRATE AND ACETAMINOPHEN 5; 325 MG/1; MG/1
1 TABLET ORAL 2 TIMES DAILY PRN
Qty: 4 TABLET | Refills: 0 | Status: SHIPPED | OUTPATIENT
Start: 2025-03-01 | End: 2025-03-03

## 2025-03-01 RX ORDER — IBUPROFEN 600 MG/1
600 TABLET, FILM COATED ORAL 3 TIMES DAILY PRN
Qty: 30 TABLET | Refills: 0 | Status: SHIPPED | OUTPATIENT
Start: 2025-03-01

## 2025-03-01 RX ORDER — LIDOCAINE 50 MG/G
1 PATCH TOPICAL DAILY
Qty: 10 PATCH | Refills: 0 | Status: SHIPPED | OUTPATIENT
Start: 2025-03-01 | End: 2025-03-11

## 2025-03-01 RX ORDER — HYDROCODONE BITARTRATE AND ACETAMINOPHEN 5; 325 MG/1; MG/1
1 TABLET ORAL ONCE
Status: COMPLETED | OUTPATIENT
Start: 2025-03-01 | End: 2025-03-01

## 2025-03-01 RX ORDER — TIZANIDINE 2 MG/1
2 TABLET ORAL EVERY 8 HOURS PRN
Qty: 10 TABLET | Refills: 0 | Status: SHIPPED | OUTPATIENT
Start: 2025-03-01

## 2025-03-01 RX ADMIN — HYDROCODONE BITARTRATE AND ACETAMINOPHEN 1 TABLET: 5; 325 TABLET ORAL at 17:03

## 2025-03-01 ASSESSMENT — PAIN SCALES - GENERAL: PAINLEVEL_OUTOF10: 10

## 2025-03-01 ASSESSMENT — PAIN - FUNCTIONAL ASSESSMENT: PAIN_FUNCTIONAL_ASSESSMENT: 0-10

## 2025-03-01 ASSESSMENT — PAIN DESCRIPTION - DESCRIPTORS
DESCRIPTORS: ACHING;DULL;DISCOMFORT
DESCRIPTORS: ACHING;DISCOMFORT;SORE;TENDER;THROBBING

## 2025-03-01 ASSESSMENT — LIFESTYLE VARIABLES
HOW OFTEN DO YOU HAVE A DRINK CONTAINING ALCOHOL: NEVER
HOW MANY STANDARD DRINKS CONTAINING ALCOHOL DO YOU HAVE ON A TYPICAL DAY: PATIENT DOES NOT DRINK

## 2025-03-01 ASSESSMENT — PAIN DESCRIPTION - PAIN TYPE: TYPE: ACUTE PAIN

## 2025-03-01 ASSESSMENT — PAIN DESCRIPTION - FREQUENCY: FREQUENCY: CONTINUOUS

## 2025-03-01 ASSESSMENT — PAIN DESCRIPTION - ONSET: ONSET: ON-GOING

## 2025-03-01 ASSESSMENT — PAIN DESCRIPTION - ORIENTATION
ORIENTATION: LEFT
ORIENTATION: LEFT

## 2025-03-01 NOTE — ED NOTES
Radiology Procedure Waiver   Name: Tanya Patel  : 1980  MRN: [id number]    Date:  3/1/25    Time: 5:08 PM EST    Benefits of immediately proceeding with Radiology exam(s) without pre-testing outweigh the risks or are not indicated as specified below and therefore the following is/are being waived:    [x] Pregnancy test   [] Patients LMP on-time and regular.   [] Patient had Tubal Ligation or has other Contraception Device.   [x] Patient  is Menopausal or Premenarcheal.    [] Patient had Full or Partial Hysterectomy.    [] Protocol for Iodine allergy    [] MRI Questionnaire     [] BUN/Creatinine   [] Patient age w/no hx of renal dysfunction.   [] Patient on Dialysis.   [] Recent Normal Labs.  Electronically signed by JOSEPH Tinajero CNP on 3/1/25 at 5:08 PM EST              Anupama Dorman APRN - CNP  [id number]

## 2025-03-02 NOTE — ED PROVIDER NOTES
Pulmonary:      Effort: Pulmonary effort is normal.      Breath sounds: Normal breath sounds.   Abdominal:      General: Abdomen is flat. Bowel sounds are normal.      Palpations: Abdomen is soft.   Musculoskeletal:      Left shoulder: Tenderness present. No swelling, deformity, effusion, bony tenderness or crepitus. Normal range of motion. Normal strength. Normal pulse.      Left upper arm: Normal.      Left elbow: Tenderness present in olecranon process.      Left forearm: Normal.        Arms:       Cervical back: Normal.      Thoracic back: Normal.      Lumbar back: No tenderness. Negative right straight leg raise test and negative left straight leg raise test.        Back:       Right hip: No lacerations, bony tenderness or crepitus. Normal range of motion. Normal strength.      Left hip: Tenderness present. No deformity, lacerations, bony tenderness or crepitus. Normal range of motion. Normal strength.      Left upper leg: Normal.      Left knee: Normal.        Legs:       Comments: Ecchymosis noted to the lateral aspect of the left buttocks.   Skin:     General: Skin is warm and dry.      Capillary Refill: Capillary refill takes less than 2 seconds.   Neurological:      General: No focal deficit present.      Mental Status: She is alert and oriented to person, place, and time.   Psychiatric:         Mood and Affect: Mood normal.         Behavior: Behavior normal.         DIAGNOSTIC RESULTS         RADIOLOGY:   Non-plain film images such as CT, Ultrasound and MRI are read by the radiologist. Plain radiographic images are visualized and preliminarily interpreted by the emergency physician with the below findings:        Interpretation per the Radiologist below, if available at the time of this note:    CT LUMBAR SPINE WO CONTRAST   Final Result   1. No evidence of acute lumbar spine fracture or traumatic malalignment..   2. Signs of developing degenerative disc disease at L5-S1.   3. Signs of lumbar facet

## 2025-03-08 PROBLEM — Z12.11 COLON CANCER SCREENING: Status: RESOLVED | Noted: 2025-02-06 | Resolved: 2025-03-08

## 2025-03-25 RX ORDER — SODIUM, POTASSIUM,MAG SULFATES 17.5-3.13G
177 SOLUTION, RECONSTITUTED, ORAL ORAL ONCE
Qty: 177 ML | Refills: 0 | OUTPATIENT
Start: 2025-03-25 | End: 2025-03-25

## 2025-03-26 PROBLEM — Z12.11 COLON CANCER SCREENING: Status: ACTIVE | Noted: 2025-02-06

## 2025-03-27 ENCOUNTER — ANESTHESIA EVENT (OUTPATIENT)
Dept: ENDOSCOPY | Age: 45
End: 2025-03-27
Payer: COMMERCIAL

## 2025-03-27 NOTE — ANESTHESIA PRE PROCEDURE
Department of Anesthesiology  Preprocedure Note       Name:  Tanya Patel   Age:  44 y.o.  :  1980                                          MRN:  [id number]         Date:  3/27/2025      Surgeon: Surgeon(s):  Madison Haley MD    Procedure: Procedure(s):  COLORECTAL CANCER SCREENING, NOT HIGH RISK    Medications prior to admission:   Prior to Admission medications    Medication Sig Start Date End Date Taking? Authorizing Provider   ibuprofen (ADVIL;MOTRIN) 600 MG tablet Take 1 tablet by mouth 3 times daily as needed for Pain 3/1/25   Anupama Dorman APRN - CNP   tiZANidine (ZANAFLEX) 2 MG tablet Take 1 tablet by mouth every 8 hours as needed (muscle spasm) 3/1/25   Anupama Dorman APRN - CNP   atomoxetine (STRATTERA) 40 MG capsule  25   Marietta Ayon MD   vitamin D (ERGOCALCIFEROL) 1.25 MG (05889 UT) CAPS capsule  25   Marietta Ayon MD   PULMICORT FLEXHALER 180 MCG/ACT AEPB inhaler  25   Marietta Ayon MD   atorvastatin (LIPITOR) 40 MG tablet Take 1 tablet by mouth daily 25   Blake Marmolejo MD   losartan (COZAAR) 50 MG tablet Take 1 tablet by mouth daily 25   Blake Marmolejo MD   metoprolol tartrate (LOPRESSOR) 25 MG tablet Take 1 tablet by mouth 2 times daily 25   Blake Marmolejo MD   VORTIoxetine HBr (TRINTELLIX) 20 MG TABS tablet Take 0.5 tablets by mouth daily    Marietta Ayon MD   estradiol (ESTRACE VAGINAL) 0.1 MG/GM vaginal cream Apply pea size amount vaginally 3 times a week at night time 25   Sarah Marcial WHNP   Abatacept 125 MG/ML SOAJ Inject 125 mg into the skin every 7 days 1/10/25   Chong Pérez DO   Continuous Glucose Transmitter (DEXCOM G6 TRANSMITTER) MISC USE EVERY 90 DAYS 25   Nathalie Corey, APRN - NP   VITAMIN D PO Take 50,000 Units by mouth once a week    Marietta Ayon MD   amphetamine-dextroamphetamine (ADDERALL) 20 MG tablet Take 15 mg by mouth every morning. 24

## 2025-03-27 NOTE — PROGRESS NOTES
[facility] PRE-ADMISSION TESTING INSTRUCTIONS: [phone]    The Preadmission Testing patient is instructed accordingly using the following criteria (check applicable):    ARRIVAL INSTRUCTIONS:     Arrival Time: 0930    [x] Parking the day of Surgery is located in the Main Entrance lot.  Upon entering through the main entrance (Entrance A) make an immediate right to the surgery reception desk    [x] Bring photo ID and insurance card    [x] Bring in a copy of Living will or Durable Power of  papers.    [x] Please be sure to arrange for a responsible adult to provide transportation to and from the hospital    [x] Please arrange for a responsible adult to be with you for the 24 hour period post procedure, due to having anesthesia    [x] Please notify surgeon if you develop any illness between now and time of surgery (cold, cough, sore throat, fever, nausea, vomiting) or any signs of infections  including skin, wounds, and dental.    [x] If you awake am of surgery not feeling well or have temperature >100 please call [phone].    GENERAL INSTRUCTIONS:    [x] No solid foods after midnight. You may only have up to 8oz of water from midnight until 4 hours prior to surgery. No gum, no candy, no mints.        [x] You may brush your teeth    [x] Take medications as instructed: TAKE Hydroxyzine if needed, inhalers as normal, gabapentin, duloxetine, metoprolol, adderall and strattera AM of procedure.     [x] Bring inhalers day of surgery    [x] Stop herbal supplements and vitamins 5 days prior to procedure    [x] Follow preop dosing of blood thinners per physician instructions    [x] Take 1/2 dose of evening insulin, but no insulin after midnight:  call your PCP or endocrinologist for insulin pump management tomorrow.     [x] No oral diabetic medications after midnight    [x] If diabetic and have low blood sugar or feel symptomatic, take 1-2oz apple juice only    [x] Shower or bath with

## 2025-03-28 ENCOUNTER — HOSPITAL ENCOUNTER (OUTPATIENT)
Age: 45
Setting detail: OUTPATIENT SURGERY
Discharge: HOME | End: 2025-03-28
Attending: SURGERY | Admitting: SURGERY
Payer: COMMERCIAL

## 2025-03-28 ENCOUNTER — ANESTHESIA (OUTPATIENT)
Dept: ENDOSCOPY | Age: 45
End: 2025-03-28
Payer: COMMERCIAL

## 2025-03-28 VITALS
BODY MASS INDEX: 26.46 KG/M2 | DIASTOLIC BLOOD PRESSURE: 76 MMHG | HEIGHT: 64 IN | OXYGEN SATURATION: 97 % | WEIGHT: 155 LBS | HEART RATE: 79 BPM | SYSTOLIC BLOOD PRESSURE: 127 MMHG | TEMPERATURE: 97.2 F | RESPIRATION RATE: 16 BRPM

## 2025-03-28 LAB
GLUCOSE BLD-MCNC: 185 MG/DL (ref 74–99)
HCG, URINE, POC: NEGATIVE
Lab: NORMAL
NEGATIVE QC PASS/FAIL: NORMAL
POSITIVE QC PASS/FAIL: NORMAL

## 2025-03-28 PROCEDURE — 2709999900 HC NON-CHARGEABLE SUPPLY: Performed by: SURGERY

## 2025-03-28 PROCEDURE — 82962 GLUCOSE BLOOD TEST: CPT

## 2025-03-28 PROCEDURE — 3700000000 HC ANESTHESIA ATTENDED CARE: Performed by: SURGERY

## 2025-03-28 PROCEDURE — 7100000011 HC PHASE II RECOVERY - ADDTL 15 MIN: Performed by: SURGERY

## 2025-03-28 PROCEDURE — 3609027000 HC COLONOSCOPY: Performed by: SURGERY

## 2025-03-28 PROCEDURE — 6360000002 HC RX W HCPCS: Performed by: NURSE ANESTHETIST, CERTIFIED REGISTERED

## 2025-03-28 PROCEDURE — 3700000001 HC ADD 15 MINUTES (ANESTHESIA): Performed by: SURGERY

## 2025-03-28 PROCEDURE — 2580000003 HC RX 258: Performed by: NURSE ANESTHETIST, CERTIFIED REGISTERED

## 2025-03-28 PROCEDURE — 7100000010 HC PHASE II RECOVERY - FIRST 15 MIN: Performed by: SURGERY

## 2025-03-28 PROCEDURE — 45378 DIAGNOSTIC COLONOSCOPY: CPT | Performed by: SURGERY

## 2025-03-28 RX ORDER — SODIUM CHLORIDE 9 MG/ML
INJECTION, SOLUTION INTRAVENOUS CONTINUOUS
Status: DISCONTINUED | OUTPATIENT
Start: 2025-03-28 | End: 2025-03-28 | Stop reason: HOSPADM

## 2025-03-28 RX ORDER — SODIUM CHLORIDE 9 MG/ML
INJECTION, SOLUTION INTRAVENOUS
Status: DISCONTINUED | OUTPATIENT
Start: 2025-03-28 | End: 2025-03-28 | Stop reason: SDUPTHER

## 2025-03-28 RX ORDER — PROPOFOL 10 MG/ML
INJECTION, EMULSION INTRAVENOUS
Status: DISCONTINUED | OUTPATIENT
Start: 2025-03-28 | End: 2025-03-28 | Stop reason: SDUPTHER

## 2025-03-28 RX ADMIN — PROPOFOL 200 MG: 10 INJECTION, EMULSION INTRAVENOUS at 10:50

## 2025-03-28 RX ADMIN — SODIUM CHLORIDE: 9 INJECTION, SOLUTION INTRAVENOUS at 10:45

## 2025-03-28 ASSESSMENT — PAIN - FUNCTIONAL ASSESSMENT
PAIN_FUNCTIONAL_ASSESSMENT: 0-10
PAIN_FUNCTIONAL_ASSESSMENT: NONE - DENIES PAIN
PAIN_FUNCTIONAL_ASSESSMENT: 0-10

## 2025-03-28 ASSESSMENT — LIFESTYLE VARIABLES: SMOKING_STATUS: 0

## 2025-03-28 NOTE — ANESTHESIA POSTPROCEDURE EVALUATION
Department of Anesthesiology  Postprocedure Note    Patient: Tanya Patel  MRN: [id number]  YOB: 1980  Date of evaluation: 3/28/2025    Procedure Summary       Date: 03/28/25 Room / Location: [place] [address] / [facility]    Anesthesia Start: 1049 Anesthesia Stop: 1106    Procedure: COLORECTAL CANCER SCREENING, NOT HIGH RISK Diagnosis:       Colon cancer screening      (Colon cancer screening [Z12.11])    Surgeons: Madison Haley MD Responsible Provider: Nitesh Gordon DO    Anesthesia Type: MAC ASA Status: 3            Anesthesia Type: No value filed.    Lidia Phase I: Lidia Score: 10    Lidia Phase II:      Anesthesia Post Evaluation    Patient location during evaluation: PACU  Patient participation: complete - patient participated  Level of consciousness: awake and alert  Pain score: 0  Airway patency: patent  Nausea & Vomiting: no nausea and no vomiting  Cardiovascular status: blood pressure returned to baseline  Respiratory status: acceptable  Hydration status: euvolemic  Pain management: adequate and satisfactory to patient        No notable events documented.

## 2025-03-28 NOTE — H&P
Patient's office history and physical was reviewed.    Patient examined.    There has been no change in the patient's history and physical.      Physician Signature: Electronically [facility]    Patient's Name/Date of Birth: Tanya Patel / 1980    Date: 2/6/2025    PCP: Nadir Quiles Jr., DO    Referring Physician:   No ref. provider found  N/A    CHIEF COMPLAINT:    No chief complaint on file.        HISTORY OF PRESENT ILLNESS:    Tanya Patel is an 44 y.o. female who presents for a colonoscopy. The patient hs chronic constipation. No nausea, vomiting, diarrhea. No changes in stool caliber. No bloody or black stools. No abdominal pain. No unintentional weight loss. No family history of colon cancer. The patient has a known history of: no known risk factors. The patient has never had a colonoscopy before.     Past Medical History:   Past Medical History:   Diagnosis Date    Anxiety     Depression     Diabetes mellitus type 1, controlled, without complications ([facility]) 01/08/2016    Environmental allergies     Epicondylitis, lateral 02/2017    right    Gallbladder disease     GERD (gastroesophageal reflux disease)     Glaucoma     Hypertension     IBS (irritable bowel syndrome)     Insulin pump status has insulin pump    [facility]    AGNES on CPAP     no cpap    SIRS (systemic inflammatory response syndrome) ([facility]) 01/08/2016    Thyroid disease     Trigger finger     right index and little finger        Past Surgical History:   Past Surgical History:   Procedure Laterality Date    ARM SURGERY Right 04/26/2023    RIGHT ULNAR NERVE DECOMPRESSION AT THE ELBOW WITH POSSIBLE TRANSPOSITION RIGHT RADIAL NERVE DECOMPRESSION AT THE ELBOW performed by Sohan Coles MD at [facility] OR    CARPAL TUNNEL RELEASE  2011    left hand     CHOLECYSTECTOMY N/A 11/05/2024    LAPAROSCOPIC ROBOTIC XI ASSISTED  CHOLECYSTECTOMY performed by Madison Haley MD at [facility] OR    DEBRIDEMENT Right 2017    Right lateral epicondylar debridement    ENDOSCOPY, COLON, DIAGNOSTIC      FINGER TRIGGER RELEASE      middle finger bilat, and right thumb  / multiple  / last one 2017    FINGER TRIGGER RELEASE Right 2016    4th finger    FINGER TRIGGER RELEASE Right 2019    TRIGGER RELEASE RIGHT INDEX AND SMALL FINGER performed by Sohan Coles MD at [facility] OR    INTRAUTERINE DEVICE INSERTION      KNEE ARTHROSCOPY Right     LAPAROSCOPY      exploratory     [place]  DELIVERY ONLY  x2    , Low Cervical    SKIN BIOPSY          Allergies: Penicillins, Sulfa antibiotics, and Trimethoprim     Medications:   Current Facility-Administered Medications   Medication Dose Route Frequency Provider Last Rate Last Admin    0.9 % sodium chloride infusion   IntraVENous Continuous Madison Haley MD             Social History:   Social History     Tobacco Use    Smoking status: Never    Smokeless tobacco: Never   Substance Use Topics    Alcohol use: Yes     Alcohol/week: 0.0 standard drinks of alcohol     Comment: socially        Family History:   Family History   Problem Relation Age of Onset    Breast Cancer Mother     Cancer Mother     High Blood Pressure Maternal Grandmother     Cancer Maternal Aunt         lung    Cancer Maternal Uncle         kidney    Cancer Cousin         kidney       REVIEW OF SYSTEMS:    Constitutional: negative  Eyes: negative  Ears, nose, mouth, throat, and face: negative  Respiratory: negative  Cardiovascular: negative  Gastrointestinal: as in HPI  Genitourinary:negative  Integument/breast: negative  Hematologic/lymphatic: negative  Musculoskeletal:negative  Neurological: negative  Allergic/Immunologic: negative    PHYSICAL EXAM   /67   Pulse 85   Temp 97.2 °F (36.2 °C) (Temporal)   Resp 18   Ht 1.626 m (5' 4\")   Wt 70.3 kg (155 lb)   SpO2 99%   BMI 26.61 kg/m²

## 2025-03-28 NOTE — DISCHARGE INSTRUCTIONS
[facility] Colonoscopy PROCEDURE DISCHARGE INSTRUCTIONS  You may be drowsy or lightheaded after receiving sedation or anesthesia.    A responsible person should be with you for the next 24 hours.    Please follow the instructions checked below:    DIET INSTRUCTIONS:  [x]Start with light diet and progress to your normal diet as you feel like eating. If you experience nausea or repeated episodes of vomiting which persist beyond 12-24 hours, notify your doctor.  []Other     ACTIVITY INSTRUCTIONS:  [x]Rest today. Increase activity as tolerated    []No heavy lifting or strenuous activity     [x]No driving for today  []Other      MEDICATION INSTRUCTIONS:    []Prescriptions sent with you.  Use as directed.  When taking pain medications, you may experience dizziness or drowsiness.  Do not drink alcohol or drive when taking these medications.  [x]Continue preop medications                               Post-procedure Care   If any tissue was removed:   It will be sent to a lab to be examined. It may take 1-2 weeks for results. The doctor will usually give an initial report after the scope is removed. Other tests may be recommended.   A small amount of bleeding may occur during the first few days after the procedure.     When you return home after the procedure, be sure to follow your doctor's instructions, which may include:   Resume medicines as instructed by your doctor.   Resume normal diet, unless directed otherwise by your doctor.   The sedative will make you drowsy. Avoid driving, operating machinery, or making important decisions for the rest of the day.   Rest for the remainder of the day.     After arriving home, contact your doctor if any of the following occurs:   Bleeding from your rectum, notify your doctor if you pass a teaspoonful of blood or more.   Black, tarry stools   Severe abdominal pain   Hard, swollen abdomen   Signs of infection, including fever or chills   Inability to pass gas or

## 2025-03-28 NOTE — OP NOTE
Operative Note: Colonoscopy    Tanya Patel     DATE OF PROCEDURE: 3/28/2025  SURGEON: Dr. SHREE DOMINGO MD, M.D.     PREOPERATIVE DIAGNOSES:    Screening colonoscopy    POSTOPERATIVE DIAGNOSES:  Mild sigmoid diverticulosis    SPECIMENS:  * No specimens in log *     OPERATION:   Colonoscopy to the cecum      ANESTHESIA: LMAC    COMPLICATIONS: None.     BLOOD LOSS: Minimal    Procedure Note:    CONSENT AND INDICATIONS:  This is a 44 y.o. year old female who is having a screening colonoscopy today.  I have discussed with the patient and/or the patient representative the indication, alternatives, and the possible risks and/or complications of the planned procedure and the anesthesia methods. The patient and/or patient representative appear to understand and agree to proceed.    PROCEDURE: Bowel prep was done yesterday until the bowels were clear. The patient was placed on the table and sedated by anesthesia. A rectal exam was performed and no mass was felt. A lubricated scope was passed into the rectum which looked normal.  The scope was passed all the way around through the sigmoid, descending, transverse and ascending colon to the cecum. The bowel prep was mostly clear. There was mild sigmoid diverticulosis seen. The cecum was identified by the appendiceal orifice, ileocecal valve, and light reflex in the RLQ.The scope was then slowly withdrawn, each area was examined again on the way out.  The scope was retroflexed in the rectum and it was normal. Withdrawal time was at least 6 minutes. The patient tolerated the procedure well.     PLAN:    Follow up colonoscopy in 10 years.    Physician Signature: Electronically signed by Dr. SHREE DOMINGO MD M.D. FACS    Send copy of H&P to PCP, Nadir Quiles Jr., DO and referring physician, No ref. provider found

## 2025-04-12 ENCOUNTER — APPOINTMENT (OUTPATIENT)
Dept: CT IMAGING | Age: 45
End: 2025-04-12
Payer: COMMERCIAL

## 2025-04-12 ENCOUNTER — HOSPITAL ENCOUNTER (EMERGENCY)
Age: 45
Discharge: HOME | End: 2025-04-12
Payer: COMMERCIAL

## 2025-04-12 VITALS
DIASTOLIC BLOOD PRESSURE: 80 MMHG | HEART RATE: 95 BPM | OXYGEN SATURATION: 97 % | HEIGHT: 64 IN | BODY MASS INDEX: 25.61 KG/M2 | TEMPERATURE: 98.2 F | WEIGHT: 150 LBS | RESPIRATION RATE: 20 BRPM | SYSTOLIC BLOOD PRESSURE: 144 MMHG

## 2025-04-12 DIAGNOSIS — N30.01 ACUTE CYSTITIS WITH HEMATURIA: ICD-10-CM

## 2025-04-12 DIAGNOSIS — R10.9 RIGHT FLANK PAIN: Primary | ICD-10-CM

## 2025-04-12 LAB
ALBUMIN SERPL-MCNC: 4.6 G/DL (ref 3.5–5.2)
ALP SERPL-CCNC: 99 U/L (ref 35–104)
ALT SERPL-CCNC: 8 U/L (ref 0–32)
ANION GAP SERPL CALCULATED.3IONS-SCNC: 11 MMOL/L (ref 7–16)
AST SERPL-CCNC: 12 U/L (ref 0–31)
BACTERIA URNS QL MICRO: ABNORMAL
BASOPHILS # BLD: 0.06 K/UL (ref 0–0.2)
BASOPHILS NFR BLD: 1 % (ref 0–2)
BILIRUB SERPL-MCNC: 0.5 MG/DL (ref 0–1.2)
BILIRUB UR QL STRIP: NEGATIVE
BUN SERPL-MCNC: 10 MG/DL (ref 6–20)
CALCIUM SERPL-MCNC: 9.7 MG/DL (ref 8.6–10.2)
CHLORIDE SERPL-SCNC: 99 MMOL/L (ref 98–107)
CLARITY UR: ABNORMAL
CO2 SERPL-SCNC: 28 MMOL/L (ref 22–29)
COLOR UR: YELLOW
CREAT SERPL-MCNC: 0.9 MG/DL (ref 0.5–1)
EOSINOPHIL # BLD: 0.05 K/UL (ref 0.05–0.5)
EOSINOPHILS RELATIVE PERCENT: 1 % (ref 0–6)
ERYTHROCYTE [DISTWIDTH] IN BLOOD BY AUTOMATED COUNT: 12.6 % (ref 11.5–15)
GFR, ESTIMATED: 85 ML/MIN/1.73M2
GLUCOSE SERPL-MCNC: 161 MG/DL (ref 74–99)
GLUCOSE UR STRIP-MCNC: >=1000 MG/DL
HCG UR QL: NEGATIVE
HCT VFR BLD AUTO: 45.5 % (ref 34–48)
HGB BLD-MCNC: 15.7 G/DL (ref 11.5–15.5)
HGB UR QL STRIP.AUTO: ABNORMAL
IMM GRANULOCYTES # BLD AUTO: 0.03 K/UL (ref 0–0.58)
IMM GRANULOCYTES NFR BLD: 0 % (ref 0–5)
KETONES UR STRIP-MCNC: NEGATIVE MG/DL
LACTATE BLDV-SCNC: 0.8 MMOL/L (ref 0.5–2.2)
LEUKOCYTE ESTERASE UR QL STRIP: ABNORMAL
LIPASE SERPL-CCNC: 18 U/L (ref 13–60)
LYMPHOCYTES NFR BLD: 0.96 K/UL (ref 1.5–4)
LYMPHOCYTES RELATIVE PERCENT: 10 % (ref 20–42)
MCH RBC QN AUTO: 31.3 PG (ref 26–35)
MCHC RBC AUTO-ENTMCNC: 34.5 G/DL (ref 32–34.5)
MCV RBC AUTO: 90.6 FL (ref 80–99.9)
MONOCYTES NFR BLD: 0.65 K/UL (ref 0.1–0.95)
MONOCYTES NFR BLD: 7 % (ref 2–12)
NEUTROPHILS NFR BLD: 82 % (ref 43–80)
NEUTS SEG NFR BLD: 7.64 K/UL (ref 1.8–7.3)
NITRITE UR QL STRIP: POSITIVE
PH UR STRIP: 6.5 [PH] (ref 5–8)
PLATELET # BLD AUTO: 350 K/UL (ref 130–450)
PMV BLD AUTO: 8.9 FL (ref 7–12)
POTASSIUM SERPL-SCNC: 3.9 MMOL/L (ref 3.5–5)
PROT SERPL-MCNC: 7.9 G/DL (ref 6.4–8.3)
PROT UR STRIP-MCNC: 30 MG/DL
RBC # BLD AUTO: 5.02 M/UL (ref 3.5–5.5)
RBC #/AREA URNS HPF: ABNORMAL /HPF
SODIUM SERPL-SCNC: 138 MMOL/L (ref 132–146)
SP GR UR STRIP: 1.01 (ref 1–1.03)
UROBILINOGEN UR STRIP-ACNC: 1 EU/DL (ref 0–1)
WBC #/AREA URNS HPF: ABNORMAL /HPF
WBC OTHER # BLD: 9.4 K/UL (ref 4.5–11.5)

## 2025-04-12 PROCEDURE — 85025 COMPLETE CBC W/AUTO DIFF WBC: CPT

## 2025-04-12 PROCEDURE — 84703 CHORIONIC GONADOTROPIN ASSAY: CPT

## 2025-04-12 PROCEDURE — 83605 ASSAY OF LACTIC ACID: CPT

## 2025-04-12 PROCEDURE — 87077 CULTURE AEROBIC IDENTIFY: CPT

## 2025-04-12 PROCEDURE — 96374 THER/PROPH/DIAG INJ IV PUSH: CPT

## 2025-04-12 PROCEDURE — 96375 TX/PRO/DX INJ NEW DRUG ADDON: CPT

## 2025-04-12 PROCEDURE — 87086 URINE CULTURE/COLONY COUNT: CPT

## 2025-04-12 PROCEDURE — 81001 URINALYSIS AUTO W/SCOPE: CPT

## 2025-04-12 PROCEDURE — 2500000003 HC RX 250 WO HCPCS: Performed by: NURSE PRACTITIONER

## 2025-04-12 PROCEDURE — 99284 EMERGENCY DEPT VISIT MOD MDM: CPT

## 2025-04-12 PROCEDURE — 80053 COMPREHEN METABOLIC PANEL: CPT

## 2025-04-12 PROCEDURE — 83690 ASSAY OF LIPASE: CPT

## 2025-04-12 PROCEDURE — 6360000002 HC RX W HCPCS: Performed by: NURSE PRACTITIONER

## 2025-04-12 PROCEDURE — 2580000003 HC RX 258: Performed by: NURSE PRACTITIONER

## 2025-04-12 PROCEDURE — 74176 CT ABD & PELVIS W/O CONTRAST: CPT

## 2025-04-12 PROCEDURE — 96376 TX/PRO/DX INJ SAME DRUG ADON: CPT

## 2025-04-12 RX ORDER — ONDANSETRON 2 MG/ML
4 INJECTION INTRAMUSCULAR; INTRAVENOUS ONCE
Status: COMPLETED | OUTPATIENT
Start: 2025-04-12 | End: 2025-04-12

## 2025-04-12 RX ORDER — CEFDINIR 300 MG/1
300 CAPSULE ORAL 2 TIMES DAILY
Qty: 20 CAPSULE | Refills: 0 | Status: SHIPPED | OUTPATIENT
Start: 2025-04-12 | End: 2025-04-22

## 2025-04-12 RX ORDER — CEFDINIR 300 MG/1
300 CAPSULE ORAL 2 TIMES DAILY
Qty: 14 CAPSULE | Refills: 0 | Status: SHIPPED | OUTPATIENT
Start: 2025-04-12 | End: 2025-04-12

## 2025-04-12 RX ORDER — 0.9 % SODIUM CHLORIDE 0.9 %
1000 INTRAVENOUS SOLUTION INTRAVENOUS ONCE
Status: COMPLETED | OUTPATIENT
Start: 2025-04-12 | End: 2025-04-12

## 2025-04-12 RX ORDER — KETOROLAC TROMETHAMINE 30 MG/ML
30 INJECTION, SOLUTION INTRAMUSCULAR; INTRAVENOUS ONCE
Status: COMPLETED | OUTPATIENT
Start: 2025-04-12 | End: 2025-04-12

## 2025-04-12 RX ORDER — KETOROLAC TROMETHAMINE 10 MG/1
10 TABLET, FILM COATED ORAL EVERY 6 HOURS PRN
Qty: 20 TABLET | Refills: 0 | Status: SHIPPED | OUTPATIENT
Start: 2025-04-12 | End: 2025-04-17

## 2025-04-12 RX ORDER — ONDANSETRON 4 MG/1
4 TABLET, ORALLY DISINTEGRATING ORAL 3 TIMES DAILY PRN
Qty: 21 TABLET | Refills: 0 | Status: SHIPPED | OUTPATIENT
Start: 2025-04-12

## 2025-04-12 RX ADMIN — ONDANSETRON 4 MG: 2 INJECTION, SOLUTION INTRAMUSCULAR; INTRAVENOUS at 14:47

## 2025-04-12 RX ADMIN — SODIUM CHLORIDE 1000 ML: 0.9 INJECTION, SOLUTION INTRAVENOUS at 14:46

## 2025-04-12 RX ADMIN — KETOROLAC TROMETHAMINE 30 MG: 30 INJECTION, SOLUTION INTRAMUSCULAR at 14:47

## 2025-04-12 RX ADMIN — ONDANSETRON 4 MG: 2 INJECTION, SOLUTION INTRAMUSCULAR; INTRAVENOUS at 18:57

## 2025-04-12 RX ADMIN — WATER 1000 MG: 1 INJECTION INTRAMUSCULAR; INTRAVENOUS; SUBCUTANEOUS at 18:36

## 2025-04-12 ASSESSMENT — PAIN SCALES - GENERAL
PAINLEVEL_OUTOF10: 8
PAINLEVEL_OUTOF10: 6
PAINLEVEL_OUTOF10: 5

## 2025-04-12 ASSESSMENT — PAIN DESCRIPTION - ORIENTATION
ORIENTATION: RIGHT
ORIENTATION: RIGHT

## 2025-04-12 ASSESSMENT — PAIN DESCRIPTION - FREQUENCY: FREQUENCY: CONTINUOUS

## 2025-04-12 ASSESSMENT — PAIN DESCRIPTION - LOCATION
LOCATION: FLANK
LOCATION: FLANK
LOCATION: GENERALIZED

## 2025-04-12 ASSESSMENT — PAIN DESCRIPTION - PAIN TYPE: TYPE: ACUTE PAIN

## 2025-04-12 ASSESSMENT — PAIN - FUNCTIONAL ASSESSMENT
PAIN_FUNCTIONAL_ASSESSMENT: 0-10
PAIN_FUNCTIONAL_ASSESSMENT: ACTIVITIES ARE NOT PREVENTED

## 2025-04-12 ASSESSMENT — PAIN DESCRIPTION - DESCRIPTORS
DESCRIPTORS: ACHING;THROBBING
DESCRIPTORS: ACHING;SORE;DISCOMFORT;DULL
DESCRIPTORS: ACHING;DISCOMFORT;THROBBING

## 2025-04-12 ASSESSMENT — PAIN DESCRIPTION - ONSET: ONSET: ON-GOING

## 2025-04-12 NOTE — DISCHARGE INSTRUCTIONS
Toradol 10 mg every 6-8 hours as needed for pain.  Zofran as needed for nausea or vomiting.  Encourage fluids.  Omnicef as directed.

## 2025-04-12 NOTE — ED PROVIDER NOTES
Independent FORD Visit.        [facility] EMERGENCY DEPARTMENT  ED  Encounter Note  Admit Date/RoomTime: 2025  2:21 PM  ED Room: 10/10  NAME: Tanya Patel  : 1980  MRN: [id number]  PCP: Nadir Quiles Jr.,     CHIEF COMPLAINT     Urinary Frequency (Urinary frequency, malodorous urine, voiding in small amts started this am) and Flank Pain (Rt flank pain since this am)    HISTORY OF PRESENT ILLNESS        Tanya Patel is a 44 y.o. female who presents to the ED with complaints of urinary frequency that developed 2 days ago.  Patient states this morning her urine smelled malodorous and was dark in color.  Patient states she also developed right flank pain.  Patient states history of pyelonephritis.  Patient denies history of kidney stones.  Patient states she is diabetic.  Patient states she does feel thirsty.  Patient denies fever or chills.  Patient complains of mild nausea.  Patient states decreased appetite over the past 2 days.    REVIEW OF SYSTEMS     Pertinent positives and negatives are stated within HPI, all other systems reviewed and are negative.    Past Medical History:  has a past medical history of Anxiety, Depression, Diabetes mellitus type 1, controlled, without complications (HCC), Environmental allergies, Epicondylitis, lateral, Gallbladder disease, GERD (gastroesophageal reflux disease), Glaucoma, Hypertension, IBS (irritable bowel syndrome), Insulin pump status, AGNES on CPAP, SIRS (systemic inflammatory response syndrome) (HCC), Thyroid disease, and Trigger finger.  Surgical History:  has a past surgical history that includes pr  delivery only (x2); skin biopsy; Endoscopy, colon, diagnostic; Carpal tunnel release (); laparoscopy (); Finger trigger release; Finger trigger release (Right, 2016); Wound debridement (Right, 2017); Finger trigger release (Right, 2019); intrauterine device insertion; Arm Surgery (Right,  equal to or greater than the risk of discharge.  I discussed my risk assessment with the patient and the patient consents to the risk of discharge as well as the risk of uncertainty in estimating outcomes. At this time, the risk of acute decompensation with death before the patient can return for re-evaluation is most likely lower than the risk of death attributable to being in the hospital.     Plan of Care/Counseling:  Keshawn Coy NP reviewed today's visit with the patient in addition to providing specific details for the plan of care and counseling regarding the diagnosis and prognosis.  Questions are answered at this time and are agreeable with the plan.    ASSESSMENT     1. Right flank pain    2. Acute cystitis with hematuria        DISPOSITION   Discharged home.  Patient condition is stable.    Discharge Instructions:   Patient referred to  Nadir Quiles Jr., [address] [address]  [phone]    Call in 2 days  As needed, If symptoms worsen, For reevaluation    [facility]  [address] [place] [id number]  [phone]  Call in 2 days  As needed, If symptoms worsen, For reevaluation    NEW MEDICATIONS     Discharge Medication List as of 4/12/2025  7:02 PM        START taking these medications    Details   ketorolac (TORADOL) 10 MG tablet Take 1 tablet by mouth every 6 hours as needed for Pain, Disp-20 tablet, R-0Normal      ondansetron (ZOFRAN-ODT) 4 MG disintegrating tablet Take 1 tablet by mouth 3 times daily as needed for Nausea or Vomiting, Disp-21 tablet, R-0Normal           Electronically signed by JOSEPH Barbosa NP   DD: 4/12/25  **This report was transcribed using voice recognition software. Every effort was made to ensure accuracy; however, inadvertent computerized transcription errors may be present.  END OF ED PROVIDER NOTE

## 2025-04-14 ENCOUNTER — RESULTS FOLLOW-UP (OUTPATIENT)
Dept: PHARMACY | Age: 45
End: 2025-04-14

## 2025-04-15 LAB
MICROORGANISM SPEC CULT: ABNORMAL
SERVICE CMNT-IMP: ABNORMAL
SPECIMEN DESCRIPTION: ABNORMAL

## 2025-04-18 ENCOUNTER — PATIENT MESSAGE (OUTPATIENT)
Dept: CARDIOLOGY CLINIC | Age: 45
End: 2025-04-18

## 2025-04-25 PROBLEM — Z12.11 COLON CANCER SCREENING: Status: RESOLVED | Noted: 2025-02-06 | Resolved: 2025-04-25

## 2025-05-01 RX ORDER — PROPRANOLOL HYDROCHLORIDE 60 MG/1
60 CAPSULE, EXTENDED RELEASE ORAL DAILY
Qty: 30 CAPSULE | Refills: 3 | Status: SHIPPED | OUTPATIENT
Start: 2025-05-01

## 2025-05-02 ENCOUNTER — HOSPITAL ENCOUNTER (EMERGENCY)
Age: 45
Discharge: HOME | End: 2025-05-03
Attending: EMERGENCY MEDICINE
Payer: COMMERCIAL

## 2025-05-02 ENCOUNTER — APPOINTMENT (OUTPATIENT)
Dept: CT IMAGING | Age: 45
End: 2025-05-02
Payer: COMMERCIAL

## 2025-05-02 DIAGNOSIS — N39.0 URINARY TRACT INFECTION WITHOUT HEMATURIA, SITE UNSPECIFIED: Primary | ICD-10-CM

## 2025-05-02 LAB
ALBUMIN SERPL-MCNC: 4.4 G/DL (ref 3.5–5.2)
ALP SERPL-CCNC: 111 U/L (ref 35–104)
ALT SERPL-CCNC: 10 U/L (ref 0–32)
ANION GAP SERPL CALCULATED.3IONS-SCNC: 12 MMOL/L (ref 7–16)
AST SERPL-CCNC: 13 U/L (ref 0–31)
BASOPHILS # BLD: 0.07 K/UL (ref 0–0.2)
BASOPHILS NFR BLD: 1 % (ref 0–2)
BILIRUB SERPL-MCNC: 0.5 MG/DL (ref 0–1.2)
BILIRUB UR QL STRIP: NEGATIVE
BUN SERPL-MCNC: 10 MG/DL (ref 6–20)
CALCIUM SERPL-MCNC: 9.5 MG/DL (ref 8.6–10.2)
CHLORIDE SERPL-SCNC: 97 MMOL/L (ref 98–107)
CLARITY UR: CLEAR
CO2 SERPL-SCNC: 25 MMOL/L (ref 22–29)
COLOR UR: YELLOW
CREAT SERPL-MCNC: 1 MG/DL (ref 0.5–1)
EOSINOPHIL # BLD: 0.08 K/UL (ref 0.05–0.5)
EOSINOPHILS RELATIVE PERCENT: 1 % (ref 0–6)
ERYTHROCYTE [DISTWIDTH] IN BLOOD BY AUTOMATED COUNT: 12.7 % (ref 11.5–15)
GFR, ESTIMATED: 69 ML/MIN/1.73M2
GLUCOSE SERPL-MCNC: 180 MG/DL (ref 74–99)
GLUCOSE UR STRIP-MCNC: >=1000 MG/DL
HCG UR QL: NEGATIVE
HCT VFR BLD AUTO: 39.4 % (ref 34–48)
HGB BLD-MCNC: 13.4 G/DL (ref 11.5–15.5)
HGB UR QL STRIP.AUTO: ABNORMAL
IMM GRANULOCYTES # BLD AUTO: 0.05 K/UL (ref 0–0.58)
IMM GRANULOCYTES NFR BLD: 0 % (ref 0–5)
KETONES UR STRIP-MCNC: NEGATIVE MG/DL
LACTATE BLDV-SCNC: 1.3 MMOL/L (ref 0.5–2.2)
LEUKOCYTE ESTERASE UR QL STRIP: ABNORMAL
LIPASE SERPL-CCNC: 53 U/L (ref 13–60)
LYMPHOCYTES NFR BLD: 1.07 K/UL (ref 1.5–4)
LYMPHOCYTES RELATIVE PERCENT: 10 % (ref 20–42)
MCH RBC QN AUTO: 31.5 PG (ref 26–35)
MCHC RBC AUTO-ENTMCNC: 34 G/DL (ref 32–34.5)
MCV RBC AUTO: 92.5 FL (ref 80–99.9)
MONOCYTES NFR BLD: 0.69 K/UL (ref 0.1–0.95)
MONOCYTES NFR BLD: 6 % (ref 2–12)
NEUTROPHILS NFR BLD: 83 % (ref 43–80)
NEUTS SEG NFR BLD: 9.22 K/UL (ref 1.8–7.3)
NITRITE UR QL STRIP: POSITIVE
PH UR STRIP: 7 [PH] (ref 5–8)
PLATELET # BLD AUTO: 290 K/UL (ref 130–450)
PMV BLD AUTO: 9.7 FL (ref 7–12)
POTASSIUM SERPL-SCNC: 4.2 MMOL/L (ref 3.5–5)
PROT SERPL-MCNC: 7.8 G/DL (ref 6.4–8.3)
PROT UR STRIP-MCNC: NEGATIVE MG/DL
RBC # BLD AUTO: 4.26 M/UL (ref 3.5–5.5)
SODIUM SERPL-SCNC: 134 MMOL/L (ref 132–146)
SP GR UR STRIP: <1.005 (ref 1–1.03)
UROBILINOGEN UR STRIP-ACNC: 1 EU/DL (ref 0–1)
WBC OTHER # BLD: 11.2 K/UL (ref 4.5–11.5)

## 2025-05-02 PROCEDURE — 87040 BLOOD CULTURE FOR BACTERIA: CPT

## 2025-05-02 PROCEDURE — 85025 COMPLETE CBC W/AUTO DIFF WBC: CPT

## 2025-05-02 PROCEDURE — 99285 EMERGENCY DEPT VISIT HI MDM: CPT

## 2025-05-02 PROCEDURE — 96374 THER/PROPH/DIAG INJ IV PUSH: CPT

## 2025-05-02 PROCEDURE — 96375 TX/PRO/DX INJ NEW DRUG ADDON: CPT

## 2025-05-02 PROCEDURE — 96361 HYDRATE IV INFUSION ADD-ON: CPT

## 2025-05-02 PROCEDURE — 6360000004 HC RX CONTRAST MEDICATION: Performed by: RADIOLOGY

## 2025-05-02 PROCEDURE — 83690 ASSAY OF LIPASE: CPT

## 2025-05-02 PROCEDURE — 6360000002 HC RX W HCPCS: Performed by: EMERGENCY MEDICINE

## 2025-05-02 PROCEDURE — 83605 ASSAY OF LACTIC ACID: CPT

## 2025-05-02 PROCEDURE — 84703 CHORIONIC GONADOTROPIN ASSAY: CPT

## 2025-05-02 PROCEDURE — 2580000003 HC RX 258: Performed by: EMERGENCY MEDICINE

## 2025-05-02 PROCEDURE — 81003 URINALYSIS AUTO W/O SCOPE: CPT

## 2025-05-02 PROCEDURE — 80053 COMPREHEN METABOLIC PANEL: CPT

## 2025-05-02 PROCEDURE — 74177 CT ABD & PELVIS W/CONTRAST: CPT

## 2025-05-02 RX ORDER — ONDANSETRON 2 MG/ML
4 INJECTION INTRAMUSCULAR; INTRAVENOUS ONCE
Status: COMPLETED | OUTPATIENT
Start: 2025-05-02 | End: 2025-05-02

## 2025-05-02 RX ORDER — 0.9 % SODIUM CHLORIDE 0.9 %
1000 INTRAVENOUS SOLUTION INTRAVENOUS ONCE
Status: COMPLETED | OUTPATIENT
Start: 2025-05-02 | End: 2025-05-02

## 2025-05-02 RX ORDER — IOPAMIDOL 755 MG/ML
75 INJECTION, SOLUTION INTRAVASCULAR
Status: COMPLETED | OUTPATIENT
Start: 2025-05-02 | End: 2025-05-02

## 2025-05-02 RX ORDER — KETOROLAC TROMETHAMINE 15 MG/ML
15 INJECTION, SOLUTION INTRAMUSCULAR; INTRAVENOUS ONCE
Status: COMPLETED | OUTPATIENT
Start: 2025-05-02 | End: 2025-05-02

## 2025-05-02 RX ADMIN — IOPAMIDOL 75 ML: 755 INJECTION, SOLUTION INTRAVENOUS at 23:19

## 2025-05-02 RX ADMIN — ONDANSETRON 4 MG: 2 INJECTION, SOLUTION INTRAMUSCULAR; INTRAVENOUS at 21:41

## 2025-05-02 RX ADMIN — KETOROLAC TROMETHAMINE 15 MG: 15 INJECTION, SOLUTION INTRAMUSCULAR; INTRAVENOUS at 21:41

## 2025-05-02 RX ADMIN — SODIUM CHLORIDE 1000 ML: 0.9 INJECTION, SOLUTION INTRAVENOUS at 21:42

## 2025-05-02 ASSESSMENT — LIFESTYLE VARIABLES
HOW OFTEN DO YOU HAVE A DRINK CONTAINING ALCOHOL: MONTHLY OR LESS
HOW MANY STANDARD DRINKS CONTAINING ALCOHOL DO YOU HAVE ON A TYPICAL DAY: 1 OR 2

## 2025-05-02 ASSESSMENT — PAIN SCALES - GENERAL: PAINLEVEL_OUTOF10: 8

## 2025-05-02 ASSESSMENT — PAIN - FUNCTIONAL ASSESSMENT: PAIN_FUNCTIONAL_ASSESSMENT: 0-10

## 2025-05-02 ASSESSMENT — PAIN DESCRIPTION - LOCATION: LOCATION: BACK

## 2025-05-03 VITALS
DIASTOLIC BLOOD PRESSURE: 80 MMHG | WEIGHT: 150 LBS | HEIGHT: 64 IN | SYSTOLIC BLOOD PRESSURE: 143 MMHG | OXYGEN SATURATION: 100 % | RESPIRATION RATE: 17 BRPM | TEMPERATURE: 99.7 F | BODY MASS INDEX: 25.61 KG/M2 | HEART RATE: 97 BPM

## 2025-05-03 PROCEDURE — 6370000000 HC RX 637 (ALT 250 FOR IP): Performed by: EMERGENCY MEDICINE

## 2025-05-03 RX ORDER — ACETAMINOPHEN 500 MG
1000 TABLET ORAL
Status: DISCONTINUED | OUTPATIENT
Start: 2025-05-03 | End: 2025-05-03

## 2025-05-03 RX ORDER — CIPROFLOXACIN 2 MG/ML
400 INJECTION, SOLUTION INTRAVENOUS ONCE
Status: DISCONTINUED | OUTPATIENT
Start: 2025-05-03 | End: 2025-05-03

## 2025-05-03 RX ORDER — CIPROFLOXACIN 500 MG/1
500 TABLET, FILM COATED ORAL ONCE
Status: COMPLETED | OUTPATIENT
Start: 2025-05-03 | End: 2025-05-03

## 2025-05-03 RX ORDER — 0.9 % SODIUM CHLORIDE 0.9 %
1000 INTRAVENOUS SOLUTION INTRAVENOUS ONCE
Status: DISCONTINUED | OUTPATIENT
Start: 2025-05-03 | End: 2025-05-03

## 2025-05-03 RX ORDER — CIPROFLOXACIN 500 MG/1
500 TABLET, FILM COATED ORAL 2 TIMES DAILY
Qty: 28 TABLET | Refills: 0 | Status: SHIPPED | OUTPATIENT
Start: 2025-05-03 | End: 2025-05-17

## 2025-05-03 RX ADMIN — CIPROFLOXACIN HYDROCHLORIDE 500 MG: 500 TABLET, FILM COATED ORAL at 01:19

## 2025-05-03 NOTE — ED PROVIDER NOTES
[facility] EMERGENCY DEPARTMENT  EMERGENCY DEPARTMENT ENCOUNTER      Pt Name: Tanya Patel  MRN: [id number]  Birthdate 1980  Date of evaluation: 5/2/2025  Provider: Kingsley Matias MD     CHIEF COMPLAINT       Chief Complaint   Patient presents with    Frequent/Recurrent UTI     uti for the past two weeks and states finished antibiotics. reports sxs remains.         HISTORY OF PRESENT ILLNESS   (Location/Symptom, Timing/Onset, Context/Setting, Quality, Duration, Modifying Factors, Severity) Note limiting factors.   I wore a kn95 mask for the entirety of this encounter.      HPI    Tanya Patel is a 44 y.o. female who presents to the emergency department 2 weeks of worsening UTI symptoms has been on 2 rounds of Omnicef without improvement she is having fevers nausea vomiting and now having back pain type I diabetic.    Nursing Notes were reviewed.    REVIEW OF SYSTEMS    (2+ for level 4; 10+ for level 5)   Review of Systems    PAST MEDICAL HISTORY     Past Medical History:   Diagnosis Date    Anxiety     Depression     Diabetes mellitus type 1, controlled, without complications ([facility]) 01/08/2016    Environmental allergies     Epicondylitis, lateral 02/2017    right    Gallbladder disease     GERD (gastroesophageal reflux disease)     Glaucoma     Hypertension     IBS (irritable bowel syndrome)     Insulin pump status has insulin pump    Dexcom. [facility]    AGNES on CPAP     no cpap    SIRS (systemic inflammatory response syndrome) ([facility]) 01/08/2016    Thyroid disease     Trigger finger     right index and little finger       SURGICAL HISTORY       Past Surgical History:   Procedure Laterality Date    ARM SURGERY Right 04/26/2023    RIGHT ULNAR NERVE DECOMPRESSION AT THE ELBOW WITH POSSIBLE TRANSPOSITION RIGHT RADIAL NERVE DECOMPRESSION AT THE ELBOW performed by Sohan Coles MD at [facility] OR    CARPAL TUNNEL RELEASE  2011    left hand      CAPSULE    Take 1 capsule by mouth 3 times daily as needed for Anxiety    INSULIN ASPART (NOVOLOG) 100 UNIT/ML INJECTION VIAL    100 units daily via insulin pump    KETOROLAC (TORADOL) 10 MG TABLET    Take 1 tablet by mouth every 6 hours as needed for Pain    LEVOTHYROXINE (SYNTHROID) 100 MCG TABLET    Take 1 tablet by mouth daily    LOSARTAN (COZAAR) 50 MG TABLET    Take 1 tablet by mouth daily    MONTELUKAST (SINGULAIR) 10 MG TABLET    Take 1 tablet by mouth nightly as needed    ONDANSETRON (ZOFRAN-ODT) 4 MG DISINTEGRATING TABLET    Take 1 tablet by mouth 3 times daily as needed for Nausea or Vomiting    PROGESTERONE (PROMETRIUM) 100 MG CAPS CAPSULE    Take 1 capsule by mouth nightly    PROPRANOLOL (INDERAL LA) 60 MG EXTENDED RELEASE CAPSULE    Take 1 capsule by mouth daily    PULMICORT FLEXHALER 180 MCG/ACT AEPB INHALER    2 times daily    TIZANIDINE (ZANAFLEX) 2 MG TABLET    Take 1 tablet by mouth every 8 hours as needed (muscle spasm)    VITAMIN D PO    Take 5,000 Units by mouth once a week    VOQUEZNA 20 MG TABS    20 mg every morning    VORTIOXETINE HBR (TRINTELLIX) 20 MG TABS TABLET    Take 1 tablet by mouth at bedtime       ALLERGIES     Penicillins, Sulfa antibiotics, and Trimethoprim    FAMILY HISTORY       Family History   Problem Relation Age of Onset    Breast Cancer Mother     Cancer Mother     High Blood Pressure Maternal Grandmother     Cancer Maternal Aunt         lung    Cancer Maternal Uncle         kidney    Cancer Cousin         kidney        SOCIAL HISTORY       Social History     Socioeconomic History    Marital status:    Tobacco Use    Smoking status: Never    Smokeless tobacco: Never   Vaping Use    Vaping status: Never Used   Substance and Sexual Activity    Alcohol use: Not Currently     Comment: socially    Drug use: No     Social Drivers of Health      [facility]    Transportation       SCREENINGS    [place] Coma Scale  Eye Opening:

## 2025-05-04 LAB
MICROORGANISM SPEC CULT: NORMAL
MICROORGANISM SPEC CULT: NORMAL
SERVICE CMNT-IMP: NORMAL
SERVICE CMNT-IMP: NORMAL
SPECIMEN DESCRIPTION: NORMAL
SPECIMEN DESCRIPTION: NORMAL

## 2025-05-12 DIAGNOSIS — G47.33 OBSTRUCTIVE SLEEP APNEA: Primary | ICD-10-CM

## 2025-05-15 RX ORDER — PROCHLORPERAZINE 25 MG/1
SUPPOSITORY RECTAL
Qty: 9 EACH | Refills: 0 | OUTPATIENT
Start: 2025-05-15

## 2025-05-16 NOTE — PROGRESS NOTES
Glucose Sensor (DEXCOM G6 SENSOR) MISC Change every 10 days 9 each 0    DULoxetine (CYMBALTA) 60 MG extended release capsule Take 1 capsule by mouth daily 60mg in th Am 30mg at bedtime      gabapentin (NEURONTIN) 300 MG capsule Take 1 capsule by mouth 3 times daily.      Continuous Blood Gluc  (DEXCOM G6 ) XANDER Use with dexcom sensors 1 each 0    ketorolac (TORADOL) 10 MG tablet Take 1 tablet by mouth every 6 hours as needed for Pain (Patient not taking: Reported on 5/19/2025) 20 tablet 0    VOQUEZNA 20 MG TABS 20 mg every morning (Patient not taking: Reported on 5/19/2025)       No current facility-administered medications for this visit.        Objective:     BP (!) 149/90 (BP Site: Left Upper Arm, Patient Position: Sitting, BP Cuff Size: Large Adult)   Pulse 94   Temp 98.9 °F (37.2 °C)   Resp 14   Ht 1.626 m (5' 4\")   Wt 69 kg (152 lb 1.9 oz)   SpO2 98%   BMI 26.11 kg/m²    Body mass index is 26.11 kg/m².    Physical Exam  HENT:      Nose: Nose normal.   Cardiovascular:      Rate and Rhythm: Normal rate.      Pulses: Normal pulses.   Neurological:      Mental Status: She is alert.               5/19/2025     3:11 PM   Sleep Medicine   Sitting and reading 3   Watching TV 3   Sitting, inactive in a public place (e.g. a theatre or a meeting) 2   As a passenger in a car for an hour without a break 3   Lying down to rest in the afternoon when circumstances permit 2   Sitting and talking to someone 0   Sitting quietly after a lunch without alcohol 2   In a car, while stopped for a few minutes in traffic 2   [place] Sleepiness Score 17       The 21st Century Cures Act allowed all electronic protected health information to be disseminated to patients under the Health Insurance Portability and Accountability Act (HIPAA). As such, there may be clinical language in these notes that may unintentionally confuse or offend patients. If this is a concern, please discuss this with your provider. This note

## 2025-05-19 ENCOUNTER — OFFICE VISIT (OUTPATIENT)
Dept: SLEEP MEDICINE | Age: 45
End: 2025-05-19
Payer: COMMERCIAL

## 2025-05-19 VITALS
RESPIRATION RATE: 14 BRPM | HEIGHT: 64 IN | HEART RATE: 94 BPM | WEIGHT: 152.12 LBS | SYSTOLIC BLOOD PRESSURE: 149 MMHG | TEMPERATURE: 98.9 F | BODY MASS INDEX: 25.97 KG/M2 | DIASTOLIC BLOOD PRESSURE: 90 MMHG | OXYGEN SATURATION: 98 %

## 2025-05-19 DIAGNOSIS — G47.33 OSA (OBSTRUCTIVE SLEEP APNEA): Primary | ICD-10-CM

## 2025-05-19 PROCEDURE — 1036F TOBACCO NON-USER: CPT | Performed by: STUDENT IN AN ORGANIZED HEALTH CARE EDUCATION/TRAINING PROGRAM

## 2025-05-19 PROCEDURE — 3080F DIAST BP >= 90 MM HG: CPT | Performed by: STUDENT IN AN ORGANIZED HEALTH CARE EDUCATION/TRAINING PROGRAM

## 2025-05-19 PROCEDURE — 99204 OFFICE O/P NEW MOD 45 MIN: CPT | Performed by: STUDENT IN AN ORGANIZED HEALTH CARE EDUCATION/TRAINING PROGRAM

## 2025-05-19 PROCEDURE — G8427 DOCREV CUR MEDS BY ELIG CLIN: HCPCS | Performed by: STUDENT IN AN ORGANIZED HEALTH CARE EDUCATION/TRAINING PROGRAM

## 2025-05-19 PROCEDURE — 3077F SYST BP >= 140 MM HG: CPT | Performed by: STUDENT IN AN ORGANIZED HEALTH CARE EDUCATION/TRAINING PROGRAM

## 2025-05-19 PROCEDURE — G8419 CALC BMI OUT NRM PARAM NOF/U: HCPCS | Performed by: STUDENT IN AN ORGANIZED HEALTH CARE EDUCATION/TRAINING PROGRAM

## 2025-05-19 RX ORDER — INSULIN ASPART INJECTION 100 [IU]/ML
INJECTION, SOLUTION SUBCUTANEOUS
COMMUNITY
Start: 2025-04-25

## 2025-05-19 RX ORDER — ARMODAFINIL 150 MG/1
150 TABLET ORAL DAILY
Qty: 30 TABLET | Refills: 2 | Status: SHIPPED | OUTPATIENT
Start: 2025-05-19 | End: 2025-05-22

## 2025-05-19 RX ORDER — OMEPRAZOLE 40 MG/1
40 CAPSULE, DELAYED RELEASE ORAL 2 TIMES DAILY
COMMUNITY
Start: 2025-05-01 | End: 2026-05-01

## 2025-05-19 ASSESSMENT — SLEEP AND FATIGUE QUESTIONNAIRES
ESS TOTAL SCORE: 17
HOW LIKELY ARE YOU TO NOD OFF OR FALL ASLEEP WHEN YOU ARE A PASSENGER IN A CAR FOR AN HOUR WITHOUT A BREAK: HIGH CHANCE OF DOZING
HOW LIKELY ARE YOU TO NOD OFF OR FALL ASLEEP IN A CAR, WHILE STOPPED FOR A FEW MINUTES IN TRAFFIC: MODERATE CHANCE OF DOZING
HOW LIKELY ARE YOU TO NOD OFF OR FALL ASLEEP WHILE SITTING AND TALKING TO SOMEONE: WOULD NEVER DOZE
HOW LIKELY ARE YOU TO NOD OFF OR FALL ASLEEP WHILE LYING DOWN TO REST IN THE AFTERNOON WHEN CIRCUMSTANCES PERMIT: MODERATE CHANCE OF DOZING
HOW LIKELY ARE YOU TO NOD OFF OR FALL ASLEEP WHILE SITTING QUIETLY AFTER LUNCH WITHOUT ALCOHOL: MODERATE CHANCE OF DOZING
HOW LIKELY ARE YOU TO NOD OFF OR FALL ASLEEP WHILE SITTING AND READING: HIGH CHANCE OF DOZING
HOW LIKELY ARE YOU TO NOD OFF OR FALL ASLEEP WHILE SITTING INACTIVE IN A PUBLIC PLACE: MODERATE CHANCE OF DOZING
HOW LIKELY ARE YOU TO NOD OFF OR FALL ASLEEP WHILE WATCHING TV: HIGH CHANCE OF DOZING

## 2025-05-22 DIAGNOSIS — G47.19 EXCESSIVE DAYTIME SLEEPINESS: Primary | ICD-10-CM

## 2025-05-22 DIAGNOSIS — G47.33 OSA (OBSTRUCTIVE SLEEP APNEA): ICD-10-CM

## 2025-05-22 RX ORDER — SOLRIAMFETOL 75 MG/1
75 TABLET, FILM COATED ORAL DAILY
Qty: 30 TABLET | Refills: 1 | Status: SHIPPED | OUTPATIENT
Start: 2025-05-22 | End: 2025-06-21

## 2025-05-28 ENCOUNTER — OFFICE VISIT (OUTPATIENT)
Dept: RHEUMATOLOGY | Age: 45
End: 2025-05-28
Payer: COMMERCIAL

## 2025-05-28 VITALS
HEART RATE: 103 BPM | BODY MASS INDEX: 25.61 KG/M2 | SYSTOLIC BLOOD PRESSURE: 149 MMHG | WEIGHT: 150 LBS | HEIGHT: 64 IN | DIASTOLIC BLOOD PRESSURE: 88 MMHG

## 2025-05-28 DIAGNOSIS — M79.7 FIBROMYALGIA: ICD-10-CM

## 2025-05-28 DIAGNOSIS — E10.40 TYPE 1 DIABETES MELLITUS WITH DIABETIC NEUROPATHY (HCC): ICD-10-CM

## 2025-05-28 DIAGNOSIS — Z79.899 HIGH RISK MEDICATION USE: ICD-10-CM

## 2025-05-28 DIAGNOSIS — M06.09 RHEUMATOID ARTHRITIS OF MULTIPLE SITES WITH NEGATIVE RHEUMATOID FACTOR (HCC): Primary | ICD-10-CM

## 2025-05-28 DIAGNOSIS — D84.9 IMMUNOSUPPRESSION: ICD-10-CM

## 2025-05-28 DIAGNOSIS — E78.2 MIXED HYPERLIPIDEMIA: ICD-10-CM

## 2025-05-28 PROCEDURE — 3079F DIAST BP 80-89 MM HG: CPT | Performed by: INTERNAL MEDICINE

## 2025-05-28 PROCEDURE — G8427 DOCREV CUR MEDS BY ELIG CLIN: HCPCS | Performed by: INTERNAL MEDICINE

## 2025-05-28 PROCEDURE — 1036F TOBACCO NON-USER: CPT | Performed by: INTERNAL MEDICINE

## 2025-05-28 PROCEDURE — G2211 COMPLEX E/M VISIT ADD ON: HCPCS | Performed by: INTERNAL MEDICINE

## 2025-05-28 PROCEDURE — G8419 CALC BMI OUT NRM PARAM NOF/U: HCPCS | Performed by: INTERNAL MEDICINE

## 2025-05-28 PROCEDURE — 2022F DILAT RTA XM EVC RTNOPTHY: CPT | Performed by: INTERNAL MEDICINE

## 2025-05-28 PROCEDURE — 3046F HEMOGLOBIN A1C LEVEL >9.0%: CPT | Performed by: INTERNAL MEDICINE

## 2025-05-28 PROCEDURE — 3077F SYST BP >= 140 MM HG: CPT | Performed by: INTERNAL MEDICINE

## 2025-05-28 PROCEDURE — 99215 OFFICE O/P EST HI 40 MIN: CPT | Performed by: INTERNAL MEDICINE

## 2025-05-28 RX ORDER — TOCILIZUMAB 180 MG/ML
162 INJECTION, SOLUTION SUBCUTANEOUS
Qty: 4 ADJUSTABLE DOSE PRE-FILLED PEN SYRINGE | Refills: 3 | Status: SHIPPED | OUTPATIENT
Start: 2025-05-28 | End: 2025-05-29

## 2025-05-28 ASSESSMENT — ENCOUNTER SYMPTOMS
NAUSEA: 0
SHORTNESS OF BREATH: 0
COLOR CHANGE: 0
COUGH: 0
TROUBLE SWALLOWING: 0
ABDOMINAL PAIN: 0
VOMITING: 0
DIARRHEA: 0

## 2025-05-28 NOTE — PROGRESS NOTES
approximately 30 minutes upon waking. She reports no fevers, rashes, or chest pain. She has undergone several surgeries on her hands for trigger finger and carpal tunnel syndrome. She is currently receiving physical therapy for her knee and back, and her family doctor has recommended dry needling for her back pain.    She also reports exertional shortness of breath, for which she was referred to a cardiologist. A stress test revealed some plaque buildup, but no stents were placed. She is currently on Lipitor.    She has type 1 diabetes and is on an insulin pump.    She has been seeing neurology and had a couple of spots on her MRI, but they were not overly concerned and did not think she had MS. She went to [facility] for that, and they do not feel that it is MS. They think it is more of a sleep problem. She is having a lot of issues sleeping and is going to see a sleep specialist this afternoon. She had a telehealth visit with a lung doctor who did a sleep study, but she did not do well with the mask. Insurance said because she did not use it so many hours every day, they were not going to cover it.    FAMILY HISTORY  Her mother had rheumatoid arthritis, diabetes after breast cancer, and remained on insulin after chemotherapy.    MEDICATIONS  Current: Lipitor, insulin  Discontinued: methotrexate, Plaquenil, Orencia           Past Medical History:   Diagnosis Date    Anxiety     Depression     Diabetes mellitus type 1, controlled, without complications ([facility]) 01/08/2016    Environmental allergies     Epicondylitis, lateral 02/2017    right    Gallbladder disease     GERD (gastroesophageal reflux disease)     Glaucoma     Hypertension     IBS (irritable bowel syndrome)     Insulin pump status has insulin pump    [facility]    AGNES on CPAP     no cpap    SIRS (systemic inflammatory response syndrome) ([facility]) 01/08/2016    Thyroid disease     Trigger finger     right

## 2025-05-29 ENCOUNTER — TELEPHONE (OUTPATIENT)
Dept: PHARMACY | Facility: CLINIC | Age: 45
End: 2025-05-29

## 2025-05-29 RX ORDER — SARILUMAB 200 MG/1.14ML
200 INJECTION, SOLUTION SUBCUTANEOUS
Qty: 2 ADJUSTABLE DOSE PRE-FILLED PEN SYRINGE | Refills: 5 | Status: SHIPPED | OUTPATIENT
Start: 2025-05-29

## 2025-05-29 RX ORDER — SARILUMAB 200 MG/1.14ML
200 INJECTION, SOLUTION SUBCUTANEOUS
Qty: 2 ADJUSTABLE DOSE PRE-FILLED PEN SYRINGE | Refills: 5 | Status: SHIPPED | OUTPATIENT
Start: 2025-05-29 | End: 2025-05-29 | Stop reason: SDUPTHER

## 2025-05-29 NOTE — TELEPHONE ENCOUNTER
Called and left detailed voicemail asking for a call back.    Electronically signed by Cyrus Franklin MA on 5/29/2025 at 2:18 PM

## 2025-06-04 NOTE — TELEPHONE ENCOUNTER
Patient called back and is notified of the medication change. Patient is agreeable to the Kevzara. Patient was advised that the medication has been approved already and to contact [facility] to setup delivery for the medication. Patient voiced understanding.    Electronically signed by Aisha Isaac CMA on 6/4/2025 at 4:13 PM

## 2025-06-04 NOTE — TELEPHONE ENCOUNTER
Left message for patient to call back. X2.    Electronically signed by Aisha Isaac CMA on 6/4/2025 at 8:50 AM

## 2025-07-17 ENCOUNTER — HOSPITAL ENCOUNTER (OUTPATIENT)
Age: 45
Discharge: HOME | End: 2025-07-17
Payer: COMMERCIAL

## 2025-07-17 DIAGNOSIS — M06.09 RHEUMATOID ARTHRITIS OF MULTIPLE SITES WITH NEGATIVE RHEUMATOID FACTOR (HCC): ICD-10-CM

## 2025-07-17 DIAGNOSIS — Z79.899 HIGH RISK MEDICATION USE: ICD-10-CM

## 2025-07-17 DIAGNOSIS — G47.11 IDIOPATHIC HYPERSOMNIA: Primary | ICD-10-CM

## 2025-07-17 DIAGNOSIS — D84.9 IMMUNOSUPPRESSION: ICD-10-CM

## 2025-07-17 LAB
HBV SURFACE AB SERPL IA-ACNC: 73.9 MIU/ML (ref 0–9.99)
HBV SURFACE AG SERPL QL IA: NONREACTIVE
HCV AB SERPL QL IA: NONREACTIVE

## 2025-07-17 PROCEDURE — 86704 HEP B CORE ANTIBODY TOTAL: CPT

## 2025-07-17 PROCEDURE — 86481 TB AG RESPONSE T-CELL SUSP: CPT

## 2025-07-17 PROCEDURE — 86317 IMMUNOASSAY INFECTIOUS AGENT: CPT

## 2025-07-17 PROCEDURE — 87340 HEPATITIS B SURFACE AG IA: CPT

## 2025-07-17 PROCEDURE — 36415 COLL VENOUS BLD VENIPUNCTURE: CPT

## 2025-07-17 PROCEDURE — 86803 HEPATITIS C AB TEST: CPT

## 2025-07-17 RX ORDER — ARMODAFINIL 250 MG/1
250 TABLET ORAL DAILY
Qty: 30 TABLET | Refills: 2 | Status: SHIPPED | OUTPATIENT
Start: 2025-07-17 | End: 2025-08-16

## 2025-07-19 LAB — HBV CORE AB SER QL: NONREACTIVE

## 2025-07-21 LAB — T SPOT TB TEST: NORMAL

## 2025-08-19 RX ORDER — LOSARTAN POTASSIUM 50 MG/1
50 TABLET ORAL DAILY
Qty: 30 TABLET | Refills: 5 | Status: SHIPPED | OUTPATIENT
Start: 2025-08-19

## (undated) DEVICE — PADDING CAST W4INXL4YD SPUN DACRON POLY POR SYN VERSATILE

## (undated) DEVICE — BLADE,STAINLESS-STEEL,15,STRL,DISPOSABLE: Brand: MEDLINE

## (undated) DEVICE — ADHESIVE SKIN CLSR 0.7ML TOP DERMBND ADV

## (undated) DEVICE — PADDING,UNDERCAST,COTTON, 4"X4YD STERILE: Brand: MEDLINE

## (undated) DEVICE — SPLINT ORTH W5XL30IN PLSTR OF PARIS LO EXOTHERM SMOOTH

## (undated) DEVICE — COVER HNDL LT DISP

## (undated) DEVICE — Device

## (undated) DEVICE — COLUMN DRAPE

## (undated) DEVICE — ZIMMER® STERILE DISPOSABLE TOURNIQUET CUFF WITH PLC, DUAL PORT, SINGLE BLADDER, 18 IN. (46 CM)

## (undated) DEVICE — DRAPE,LAP,CHOLE,W/TROUGHS,STERILE: Brand: MEDLINE

## (undated) DEVICE — ARM DRAPE

## (undated) DEVICE — DOUBLE BASIN SET: Brand: MEDLINE INDUSTRIES, INC.

## (undated) DEVICE — MEDIUM-LARGE CLIP APPLIER: Brand: ENDOWRIST

## (undated) DEVICE — INTENDED FOR TISSUE SEPARATION, AND OTHER PROCEDURES THAT REQUIRE A SHARP SURGICAL BLADE TO PUNCTURE OR CUT.: Brand: BARD-PARKER ® STAINLESS STEEL BLADES

## (undated) DEVICE — NEEDLE CLOSURE OMNICLOSE

## (undated) DEVICE — APPLICATOR MEDICATED 26 CC SOLUTION HI LT ORNG CHLORAPREP

## (undated) DEVICE — PERMANENT CAUTERY HOOK: Brand: ENDOWRIST

## (undated) DEVICE — GLOVE ORANGE PI 8 1/2   MSG9085

## (undated) DEVICE — LIQUIBAND RAPID ADHESIVE 36/CS 0.8ML: Brand: MEDLINE

## (undated) DEVICE — GLOVE SURG SZ 6 THK91MIL LTX FREE SYN POLYISOPRENE ANTI

## (undated) DEVICE — TOWEL,OR,DSP,ST,BLUE,STD,6/PK,12PK/CS: Brand: MEDLINE

## (undated) DEVICE — GLOVE SURG SZ 6 L12IN THK75MIL DK GRN LTX FREE

## (undated) DEVICE — INSUFFLATION NEEDLE TO ESTABLISH PNEUMOPERITONEUM.: Brand: INSUFFLATION NEEDLE

## (undated) DEVICE — 4-PORT MANIFOLD: Brand: NEPTUNE 2

## (undated) DEVICE — INSUFFLATION TUBING SET WITH FILTER, FUNNEL CONNECTOR AND LUER LOCK: Brand: JOSNOE MEDICAL INC

## (undated) DEVICE — ANCHOR TISSUE RETRIEVAL SYSTEM, BAG SIZE 125 ML, PORT SIZE 8 MM: Brand: ANCHOR TISSUE RETRIEVAL SYSTEM

## (undated) DEVICE — BNDG,ELSTC,MATRIX,STRL,2"X5YD,LF,HOOK&LP: Brand: MEDLINE

## (undated) DEVICE — BLADELESS OBTURATOR: Brand: WECK VISTA

## (undated) DEVICE — CADIERE FORCEPS: Brand: ENDOWRIST

## (undated) DEVICE — BLADE,STAINLESS-STEEL,11,STRL,DISPOSABLE: Brand: MEDLINE

## (undated) DEVICE — SURGICAL PROCEDURE PACK HND

## (undated) DEVICE — ELECTRODE PT RET AD L9FT HI MOIST COND ADH HYDRGEL CORDED

## (undated) DEVICE — GOWN,SIRUS,FABRNF,L,20/CS: Brand: MEDLINE

## (undated) DEVICE — PADDING UNDERCAST W4INXL4YD COT FBR LO LINTING WYTEX

## (undated) DEVICE — SPONGE GZ W4XL4IN RAYON POLY CVR W/NONWOVEN FAB STRL 2/PK

## (undated) DEVICE — KIT,ANTI FOG,W/SPONGE & FLUID,SOFT PACK: Brand: MEDLINE

## (undated) DEVICE — PADDING,UNDERCAST,COTTON, 3X4YD STERILE: Brand: MEDLINE

## (undated) DEVICE — GOWN,SIRUS,FABRNF,XL,20/CS: Brand: MEDLINE

## (undated) DEVICE — PADDING CAST W2INXL4YD COT LO LINTING WYTEX

## (undated) DEVICE — GLOVE SURG SZ 65 L12IN FNGR THK79MIL GRN LTX FREE

## (undated) DEVICE — SOLUTION IRRIG 1000ML 0.9% SOD CHL USP POUR PLAS BTL

## (undated) DEVICE — BNDG,ELSTC,MATRIX,STRL,4"X5YD,LF,HOOK&LP: Brand: MEDLINE

## (undated) DEVICE — GRADUATE TRIANG MEASURE 1000ML BLK PRNT

## (undated) DEVICE — SEAL

## (undated) DEVICE — WARMER SCP 2 ANTIFOG LAP DISP

## (undated) DEVICE — ROUND TIP SCISSORS: Brand: ENDOWRIST

## (undated) DEVICE — BNDG,ELSTC,MATRIX,STRL,3"X5YD,LF,HOOK&LP: Brand: MEDLINE

## (undated) DEVICE — SOLUTION IV IRRIG POUR BRL 0.9% SODIUM CHL 2F7124

## (undated) DEVICE — CORD,CAUTERY,BIPOLAR,STERILE: Brand: MEDLINE